# Patient Record
Sex: MALE | Race: WHITE | NOT HISPANIC OR LATINO | URBAN - METROPOLITAN AREA
[De-identification: names, ages, dates, MRNs, and addresses within clinical notes are randomized per-mention and may not be internally consistent; named-entity substitution may affect disease eponyms.]

---

## 2018-08-23 ENCOUNTER — OUTPATIENT (OUTPATIENT)
Dept: OUTPATIENT SERVICES | Facility: HOSPITAL | Age: 63
LOS: 1 days | End: 2018-08-23
Payer: COMMERCIAL

## 2018-08-23 VITALS
HEIGHT: 71 IN | SYSTOLIC BLOOD PRESSURE: 138 MMHG | HEART RATE: 64 BPM | TEMPERATURE: 98 F | DIASTOLIC BLOOD PRESSURE: 90 MMHG | WEIGHT: 175.93 LBS | RESPIRATION RATE: 16 BRPM

## 2018-08-23 DIAGNOSIS — J38.1 POLYP OF VOCAL CORD AND LARYNX: ICD-10-CM

## 2018-08-23 DIAGNOSIS — Z01.818 ENCOUNTER FOR OTHER PREPROCEDURAL EXAMINATION: ICD-10-CM

## 2018-08-23 DIAGNOSIS — Z90.49 ACQUIRED ABSENCE OF OTHER SPECIFIED PARTS OF DIGESTIVE TRACT: Chronic | ICD-10-CM

## 2018-08-23 LAB
ANION GAP SERPL CALC-SCNC: 7 MMOL/L — SIGNIFICANT CHANGE UP (ref 5–17)
APTT BLD: 33.4 SEC — SIGNIFICANT CHANGE UP (ref 27.5–37.4)
BUN SERPL-MCNC: 14 MG/DL — SIGNIFICANT CHANGE UP (ref 7–23)
CALCIUM SERPL-MCNC: 9.6 MG/DL — SIGNIFICANT CHANGE UP (ref 8.5–10.1)
CHLORIDE SERPL-SCNC: 106 MMOL/L — SIGNIFICANT CHANGE UP (ref 96–108)
CO2 SERPL-SCNC: 28 MMOL/L — SIGNIFICANT CHANGE UP (ref 22–31)
CREAT SERPL-MCNC: 0.89 MG/DL — SIGNIFICANT CHANGE UP (ref 0.5–1.3)
GLUCOSE SERPL-MCNC: 97 MG/DL — SIGNIFICANT CHANGE UP (ref 70–99)
HCT VFR BLD CALC: 47.2 % — SIGNIFICANT CHANGE UP (ref 39–50)
HGB BLD-MCNC: 16.1 G/DL — SIGNIFICANT CHANGE UP (ref 13–17)
INR BLD: 1 RATIO — SIGNIFICANT CHANGE UP (ref 0.88–1.16)
MCHC RBC-ENTMCNC: 33 PG — SIGNIFICANT CHANGE UP (ref 27–34)
MCHC RBC-ENTMCNC: 34.1 GM/DL — SIGNIFICANT CHANGE UP (ref 32–36)
MCV RBC AUTO: 96.7 FL — SIGNIFICANT CHANGE UP (ref 80–100)
NRBC # BLD: 0 /100 WBCS — SIGNIFICANT CHANGE UP (ref 0–0)
PLATELET # BLD AUTO: 294 K/UL — SIGNIFICANT CHANGE UP (ref 150–400)
POTASSIUM SERPL-MCNC: 4.5 MMOL/L — SIGNIFICANT CHANGE UP (ref 3.5–5.3)
POTASSIUM SERPL-SCNC: 4.5 MMOL/L — SIGNIFICANT CHANGE UP (ref 3.5–5.3)
PROTHROM AB SERPL-ACNC: 10.9 SEC — SIGNIFICANT CHANGE UP (ref 9.8–12.7)
RBC # BLD: 4.88 M/UL — SIGNIFICANT CHANGE UP (ref 4.2–5.8)
RBC # FLD: 12.6 % — SIGNIFICANT CHANGE UP (ref 10.3–14.5)
SODIUM SERPL-SCNC: 141 MMOL/L — SIGNIFICANT CHANGE UP (ref 135–145)
WBC # BLD: 9.65 K/UL — SIGNIFICANT CHANGE UP (ref 3.8–10.5)
WBC # FLD AUTO: 9.65 K/UL — SIGNIFICANT CHANGE UP (ref 3.8–10.5)

## 2018-08-23 PROCEDURE — 93010 ELECTROCARDIOGRAM REPORT: CPT | Mod: NC

## 2018-08-23 PROCEDURE — 86901 BLOOD TYPING SEROLOGIC RH(D): CPT

## 2018-08-23 PROCEDURE — 85027 COMPLETE CBC AUTOMATED: CPT

## 2018-08-23 PROCEDURE — 71046 X-RAY EXAM CHEST 2 VIEWS: CPT | Mod: 26

## 2018-08-23 PROCEDURE — G0463: CPT

## 2018-08-23 PROCEDURE — 85730 THROMBOPLASTIN TIME PARTIAL: CPT

## 2018-08-23 PROCEDURE — 85610 PROTHROMBIN TIME: CPT

## 2018-08-23 PROCEDURE — 93005 ELECTROCARDIOGRAM TRACING: CPT

## 2018-08-23 PROCEDURE — 86850 RBC ANTIBODY SCREEN: CPT

## 2018-08-23 PROCEDURE — 80048 BASIC METABOLIC PNL TOTAL CA: CPT

## 2018-08-23 PROCEDURE — 71046 X-RAY EXAM CHEST 2 VIEWS: CPT

## 2018-08-23 PROCEDURE — 86900 BLOOD TYPING SEROLOGIC ABO: CPT

## 2018-08-23 NOTE — H&P PST ADULT - NSANTHOSAYNRD_GEN_A_CORE
No. PRISCA screening performed.  STOP BANG Legend: 0-2 = LOW Risk; 3-4 = INTERMEDIATE Risk; 5-8 = HIGH Risk

## 2018-08-23 NOTE — H&P PST ADULT - HISTORY OF PRESENT ILLNESS
63 y/o male with c/o of "colds" on and off for few months. Was seen by Dr Gibbs few weeks ago, had a cat scan, followed up with him 3 weeks later, now scheduled to have biopsy. Pr op testing today. 63 y/o male with c/o of "colds" on and off for few months. Takes Tylenol cold and sinus and feels better after he sneeze and the secretions come out. Was seen by Dr Gibbs few weeks ago, had a cat scan, followed up with him 3 weeks later, was diagnosed with some pocket with secretions and redness in the throat,  now scheduled to have biopsy of the larynx. Pre op testing today.

## 2018-08-23 NOTE — H&P PST ADULT - ASSESSMENT
63 y/o male with c/o of "colds" on and off for few months. Takes Tylenol cold and sinus and feels better after he sneeze and the secretions come out. Was seen by Dr Gibbs few weeks ago, had a cat scan, followed up with him 3 weeks later, was diagnosed with some pocket with secretions and redness in the throat,  now scheduled to have biopsy of the larynx. Pre op testing today.

## 2018-08-28 ENCOUNTER — TRANSCRIPTION ENCOUNTER (OUTPATIENT)
Age: 63
End: 2018-08-28

## 2018-08-28 RX ORDER — SODIUM CHLORIDE 9 MG/ML
1000 INJECTION, SOLUTION INTRAVENOUS
Qty: 0 | Refills: 0 | Status: DISCONTINUED | OUTPATIENT
Start: 2018-08-29 | End: 2018-08-29

## 2018-08-29 ENCOUNTER — OUTPATIENT (OUTPATIENT)
Dept: OUTPATIENT SERVICES | Facility: HOSPITAL | Age: 63
LOS: 1 days | End: 2018-08-29
Payer: COMMERCIAL

## 2018-08-29 ENCOUNTER — RESULT REVIEW (OUTPATIENT)
Age: 63
End: 2018-08-29

## 2018-08-29 VITALS
HEART RATE: 58 BPM | WEIGHT: 175.93 LBS | DIASTOLIC BLOOD PRESSURE: 84 MMHG | RESPIRATION RATE: 21 BRPM | OXYGEN SATURATION: 95 % | TEMPERATURE: 98 F | SYSTOLIC BLOOD PRESSURE: 141 MMHG | HEIGHT: 71 IN

## 2018-08-29 VITALS — HEART RATE: 64 BPM | SYSTOLIC BLOOD PRESSURE: 155 MMHG | DIASTOLIC BLOOD PRESSURE: 80 MMHG

## 2018-08-29 DIAGNOSIS — J38.1 POLYP OF VOCAL CORD AND LARYNX: ICD-10-CM

## 2018-08-29 DIAGNOSIS — Z90.49 ACQUIRED ABSENCE OF OTHER SPECIFIED PARTS OF DIGESTIVE TRACT: Chronic | ICD-10-CM

## 2018-08-29 DIAGNOSIS — Z01.818 ENCOUNTER FOR OTHER PREPROCEDURAL EXAMINATION: ICD-10-CM

## 2018-08-29 PROCEDURE — 43193 ESOPHAGOSCP RIG TRNSO BIOPSY: CPT

## 2018-08-29 PROCEDURE — 31525 DX LARYNGOSCOPY EXCL NB: CPT

## 2018-08-29 PROCEDURE — 88305 TISSUE EXAM BY PATHOLOGIST: CPT

## 2018-08-29 PROCEDURE — 88305 TISSUE EXAM BY PATHOLOGIST: CPT | Mod: 26

## 2018-08-29 RX ORDER — SODIUM CHLORIDE 9 MG/ML
1000 INJECTION, SOLUTION INTRAVENOUS
Qty: 0 | Refills: 0 | Status: DISCONTINUED | OUTPATIENT
Start: 2018-08-29 | End: 2018-08-29

## 2018-08-29 RX ORDER — ASPIRIN/CALCIUM CARB/MAGNESIUM 324 MG
1 TABLET ORAL
Qty: 0 | Refills: 0 | COMMUNITY

## 2018-08-29 RX ORDER — HYDROMORPHONE HYDROCHLORIDE 2 MG/ML
0.5 INJECTION INTRAMUSCULAR; INTRAVENOUS; SUBCUTANEOUS
Qty: 0 | Refills: 0 | Status: DISCONTINUED | OUTPATIENT
Start: 2018-08-29 | End: 2018-08-29

## 2018-08-29 RX ORDER — OXYCODONE HYDROCHLORIDE 5 MG/1
5 TABLET ORAL ONCE
Qty: 0 | Refills: 0 | Status: DISCONTINUED | OUTPATIENT
Start: 2018-08-29 | End: 2018-08-29

## 2018-08-29 RX ORDER — METOCLOPRAMIDE HCL 10 MG
5 TABLET ORAL ONCE
Qty: 0 | Refills: 0 | Status: COMPLETED | OUTPATIENT
Start: 2018-08-29 | End: 2018-08-29

## 2018-08-29 RX ORDER — OXYCODONE HYDROCHLORIDE 5 MG/1
10 TABLET ORAL ONCE
Qty: 0 | Refills: 0 | Status: DISCONTINUED | OUTPATIENT
Start: 2018-08-29 | End: 2018-08-29

## 2018-08-29 RX ORDER — PHENYLEPHRINE/ACETAMINOPHN/CPM 5-325-2MG
0 TABLET ORAL
Qty: 0 | Refills: 0 | COMMUNITY

## 2018-08-29 RX ADMIN — Medication 5 MILLIGRAM(S): at 09:41

## 2018-08-29 RX ADMIN — SODIUM CHLORIDE 60 MILLILITER(S): 9 INJECTION, SOLUTION INTRAVENOUS at 06:51

## 2018-08-29 RX ADMIN — SODIUM CHLORIDE 100 MILLILITER(S): 9 INJECTION, SOLUTION INTRAVENOUS at 09:42

## 2018-08-29 NOTE — ASU DISCHARGE PLAN (ADULT/PEDIATRIC). - NOTIFY
Persistent Nausea and Vomiting/Unable to Urinate/Bleeding that does not stop/Swelling that continues

## 2018-08-29 NOTE — BRIEF OPERATIVE NOTE - PROCEDURE
<<-----Click on this checkbox to enter Procedure Rigid esophagoscopy  08/29/2018    Active  SKYLA  Direct laryngoscopy  08/29/2018    Active  SKYLA

## 2018-08-31 LAB — SURGICAL PATHOLOGY FINAL REPORT - CH: SIGNIFICANT CHANGE UP

## 2018-09-11 PROBLEM — Z00.00 ENCOUNTER FOR PREVENTIVE HEALTH EXAMINATION: Status: ACTIVE | Noted: 2018-09-11

## 2018-09-20 ENCOUNTER — APPOINTMENT (OUTPATIENT)
Dept: OTOLARYNGOLOGY | Facility: CLINIC | Age: 63
End: 2018-09-20
Payer: MEDICARE

## 2018-09-20 VITALS
SYSTOLIC BLOOD PRESSURE: 174 MMHG | WEIGHT: 175 LBS | DIASTOLIC BLOOD PRESSURE: 99 MMHG | HEIGHT: 71 IN | BODY MASS INDEX: 24.5 KG/M2 | HEART RATE: 90 BPM

## 2018-09-20 DIAGNOSIS — Z80.3 FAMILY HISTORY OF MALIGNANT NEOPLASM OF BREAST: ICD-10-CM

## 2018-09-20 DIAGNOSIS — F17.200 NICOTINE DEPENDENCE, UNSPECIFIED, UNCOMPLICATED: ICD-10-CM

## 2018-09-20 DIAGNOSIS — Z78.9 OTHER SPECIFIED HEALTH STATUS: ICD-10-CM

## 2018-09-20 PROCEDURE — 31575 DIAGNOSTIC LARYNGOSCOPY: CPT

## 2018-09-20 PROCEDURE — 99204 OFFICE O/P NEW MOD 45 MIN: CPT | Mod: 25

## 2018-09-25 ENCOUNTER — OUTPATIENT (OUTPATIENT)
Dept: OUTPATIENT SERVICES | Facility: HOSPITAL | Age: 63
LOS: 1 days | Discharge: HOME | End: 2018-09-25

## 2018-09-25 DIAGNOSIS — Z90.49 ACQUIRED ABSENCE OF OTHER SPECIFIED PARTS OF DIGESTIVE TRACT: Chronic | ICD-10-CM

## 2018-09-25 DIAGNOSIS — C76.0 MALIGNANT NEOPLASM OF HEAD, FACE AND NECK: ICD-10-CM

## 2018-09-28 ENCOUNTER — APPOINTMENT (OUTPATIENT)
Dept: HEMATOLOGY ONCOLOGY | Facility: CLINIC | Age: 63
End: 2018-09-28
Payer: MEDICARE

## 2018-09-28 ENCOUNTER — RESULT REVIEW (OUTPATIENT)
Age: 63
End: 2018-09-28

## 2018-09-28 VITALS
SYSTOLIC BLOOD PRESSURE: 124 MMHG | RESPIRATION RATE: 16 BRPM | BODY MASS INDEX: 25 KG/M2 | WEIGHT: 178.57 LBS | HEIGHT: 70.98 IN | TEMPERATURE: 98.6 F | HEART RATE: 72 BPM | OXYGEN SATURATION: 98 % | DIASTOLIC BLOOD PRESSURE: 78 MMHG

## 2018-09-28 DIAGNOSIS — Z80.9 FAMILY HISTORY OF MALIGNANT NEOPLASM, UNSPECIFIED: ICD-10-CM

## 2018-09-28 PROCEDURE — 99205 OFFICE O/P NEW HI 60 MIN: CPT

## 2018-09-29 ENCOUNTER — TRANSCRIPTION ENCOUNTER (OUTPATIENT)
Age: 63
End: 2018-09-29

## 2018-10-02 ENCOUNTER — APPOINTMENT (OUTPATIENT)
Dept: RADIATION ONCOLOGY | Facility: CLINIC | Age: 63
End: 2018-10-02
Payer: MEDICARE

## 2018-10-02 VITALS
SYSTOLIC BLOOD PRESSURE: 154 MMHG | DIASTOLIC BLOOD PRESSURE: 96 MMHG | OXYGEN SATURATION: 100 % | HEIGHT: 69.69 IN | TEMPERATURE: 98.06 F | BODY MASS INDEX: 25.76 KG/M2 | RESPIRATION RATE: 15 BRPM | HEART RATE: 52 BPM | WEIGHT: 177.91 LBS

## 2018-10-02 PROCEDURE — 99204 OFFICE O/P NEW MOD 45 MIN: CPT | Mod: 25

## 2018-10-03 ENCOUNTER — APPOINTMENT (OUTPATIENT)
Dept: SPEECH THERAPY | Facility: CLINIC | Age: 63
End: 2018-10-03

## 2018-10-03 ENCOUNTER — OUTPATIENT (OUTPATIENT)
Dept: OUTPATIENT SERVICES | Facility: HOSPITAL | Age: 63
LOS: 1 days | Discharge: HOME | End: 2018-10-03

## 2018-10-03 ENCOUNTER — OUTPATIENT (OUTPATIENT)
Dept: OUTPATIENT SERVICES | Facility: HOSPITAL | Age: 63
LOS: 1 days | Discharge: HOME | End: 2018-10-03
Payer: MEDICARE

## 2018-10-03 DIAGNOSIS — Z90.49 ACQUIRED ABSENCE OF OTHER SPECIFIED PARTS OF DIGESTIVE TRACT: Chronic | ICD-10-CM

## 2018-10-07 NOTE — HISTORY OF PRESENT ILLNESS
[FreeTextEntry1] : 62 y/o male without significant PMH, h/o smoking, newly diagnosed left vocal cord cancer presents for consideration of radiation treatment.\par \par  Mr. Monroy states that around June this year he started experiencing left throat discomfort and pain which radiated to left ear. A base of the tongue lesion was noted upon examination. \par \par CT neck 7/12/18 showed asymmetric prominence of left base of tongue/left lingual tonsil with thickening of left aryepiglottic fold with possible pre epiglottic and paraepiglottic involvement. 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 PET scan done in 9/2018 outside our system showed left supraglottic lesion and no avid lymph nodes. Patient is being considered for definitive radiation therapy. Patient is seeing Dr Herbert for medical oncology assessment. \par \par Today here for consult. Denies pain, neck pain, dysphagia, voice change. \par \par Side effects of radiation therapy to the head and neck were discussed with patient, including but not limited to skin reaction, fatigue, difficulty swallowing, change in appetite and taste, nausea, bone marrow suppression, cough or shortness of breath.

## 2018-10-08 DIAGNOSIS — R13.12 DYSPHAGIA, OROPHARYNGEAL PHASE: ICD-10-CM

## 2018-10-10 ENCOUNTER — OTHER (OUTPATIENT)
Age: 63
End: 2018-10-10

## 2018-10-11 ENCOUNTER — APPOINTMENT (OUTPATIENT)
Dept: OTOLARYNGOLOGY | Facility: CLINIC | Age: 63
End: 2018-10-11

## 2018-10-12 NOTE — BRIEF OPERATIVE NOTE - ESTIMATED BLOOD LOSS
2
CONSTITUTIONAL: No fevers, no chills  Eyes: no visual changes  Ears: no ear drainage, no ear pain  Nose: no nasal congestion  Mouth/Throat: no sore throat  Cardiovascular: No Chest pain  Respiratory: No SOB  Gastrointestinal: No n/v/d, no abd pain  Genitourinary: no dysuria, no hematuria  SKIN: no rashes.  NEURO: no headache  MSK: Right elbow forearm pain

## 2018-10-16 ENCOUNTER — OTHER (OUTPATIENT)
Age: 63
End: 2018-10-16

## 2018-10-16 PROCEDURE — 77300 RADIATION THERAPY DOSE PLAN: CPT | Mod: 26

## 2018-10-16 PROCEDURE — 77301 RADIOTHERAPY DOSE PLAN IMRT: CPT | Mod: 26

## 2018-10-16 PROCEDURE — 77338 DESIGN MLC DEVICE FOR IMRT: CPT | Mod: 26

## 2018-11-05 PROCEDURE — 77387B: CUSTOM | Mod: 26

## 2018-11-05 PROCEDURE — 77427 RADIATION TX MANAGEMENT X5: CPT

## 2018-11-06 PROCEDURE — 77387B: CUSTOM | Mod: 26

## 2018-11-07 ENCOUNTER — OTHER (OUTPATIENT)
Age: 63
End: 2018-11-07

## 2018-11-08 PROCEDURE — 77387B: CUSTOM | Mod: 26

## 2018-11-09 PROCEDURE — 77387B: CUSTOM | Mod: 26

## 2018-11-12 PROCEDURE — 77014: CPT | Mod: 26

## 2018-11-13 PROCEDURE — 77427 RADIATION TX MANAGEMENT X5: CPT

## 2018-11-13 PROCEDURE — 77387B: CUSTOM | Mod: 26

## 2018-11-14 PROCEDURE — 77387B: CUSTOM | Mod: 26

## 2018-11-15 ENCOUNTER — OTHER (OUTPATIENT)
Age: 63
End: 2018-11-15

## 2018-11-15 PROCEDURE — 77387B: CUSTOM | Mod: 26

## 2018-11-16 VITALS
WEIGHT: 179.9 LBS | BODY MASS INDEX: 26.04 KG/M2 | OXYGEN SATURATION: 99 % | RESPIRATION RATE: 16 BRPM | DIASTOLIC BLOOD PRESSURE: 61 MMHG | SYSTOLIC BLOOD PRESSURE: 163 MMHG | HEART RATE: 82 BPM

## 2018-11-16 PROCEDURE — 77387B: CUSTOM | Mod: 26

## 2018-11-16 NOTE — REVIEW OF SYSTEMS
[Dysphagia: Grade 0] : Dysphagia: Grade 0 [Fatigue: Grade 0] : Fatigue: Grade 0 [Mucositis Oral: Grade 0] : Mucositis Oral: Grade 0  [Xerostomia: Grade 0] : Xerostomia: Grade 0 [Oral Pain: Grade 0] : Oral Pain: Grade 0 [Dysgeusia: Grade 0] : Dysgeusia: Grade 0 [Skin Hyperpigmentation: Grade 0] : Skin Hyperpigmentation: Grade 0 [Dermatitis Radiation: Grade 0] : Dermatitis Radiation: Grade 0

## 2018-11-18 LAB
ANION GAP SERPL CALC-SCNC: 11 MMOL/L
BASOPHILS # BLD AUTO: 0.02 K/UL
BASOPHILS NFR BLD AUTO: 0.2 %
BUN SERPL-MCNC: 29 MG/DL
CALCIUM SERPL-MCNC: 10 MG/DL
CHLORIDE SERPL-SCNC: 103 MMOL/L
CO2 SERPL-SCNC: 26 MMOL/L
CREAT SERPL-MCNC: 1.39 MG/DL
EOSINOPHIL # BLD AUTO: 0.11 K/UL
EOSINOPHIL NFR BLD AUTO: 1.2 %
GLUCOSE SERPL-MCNC: 90 MG/DL
HCT VFR BLD CALC: 43.7 %
HGB BLD-MCNC: 14.5 G/DL
IMM GRANULOCYTES NFR BLD AUTO: 0.3 %
LYMPHOCYTES # BLD AUTO: 1.32 K/UL
LYMPHOCYTES NFR BLD AUTO: 14.2 %
MAN DIFF?: NORMAL
MCHC RBC-ENTMCNC: 33 PG
MCHC RBC-ENTMCNC: 33.2 GM/DL
MCV RBC AUTO: 99.3 FL
MONOCYTES # BLD AUTO: 0.7 K/UL
MONOCYTES NFR BLD AUTO: 7.5 %
NEUTROPHILS # BLD AUTO: 7.13 K/UL
NEUTROPHILS NFR BLD AUTO: 76.6 %
PLATELET # BLD AUTO: 256 K/UL
POTASSIUM SERPL-SCNC: 4.9 MMOL/L
RBC # BLD: 4.4 M/UL
RBC # FLD: 13.4 %
SODIUM SERPL-SCNC: 140 MMOL/L
WBC # FLD AUTO: 9.31 K/UL

## 2018-11-18 NOTE — DISEASE MANAGEMENT
[Clinical] : TNM Stage: c [II] : II [TTNM] : 2 [NTNM] : 0 [MTNM] : 0 [de-identified] : 7406 [de-identified] : 8967 [de-identified] : Larynx + nodes

## 2018-11-19 PROCEDURE — 77014: CPT | Mod: 26

## 2018-11-20 PROCEDURE — 77427 RADIATION TX MANAGEMENT X5: CPT

## 2018-11-20 PROCEDURE — 77387B: CUSTOM | Mod: 26

## 2018-11-21 ENCOUNTER — OTHER (OUTPATIENT)
Age: 63
End: 2018-11-21

## 2018-11-21 PROCEDURE — 77387B: CUSTOM | Mod: 26

## 2018-11-23 PROCEDURE — 77387B: CUSTOM | Mod: 26

## 2018-11-26 PROCEDURE — 77014: CPT | Mod: 26

## 2018-11-27 PROCEDURE — 77387B: CUSTOM | Mod: 26

## 2018-11-28 ENCOUNTER — OTHER (OUTPATIENT)
Age: 63
End: 2018-11-28

## 2018-11-28 PROCEDURE — 77387B: CUSTOM | Mod: 26

## 2018-11-28 NOTE — REVIEW OF SYSTEMS
[Dysphagia: Grade 1 - Symptomatic, able to eat regular diet] : Dysphagia: Grade 1 - Symptomatic, able to eat regular diet [Fatigue: Grade 0] : Fatigue: Grade 0 [Mucositis Oral: Grade 0] : Mucositis Oral: Grade 0  [Xerostomia: Grade 0] : Xerostomia: Grade 0 [Oral Pain: Grade 0] : Oral Pain: Grade 0 [Dysgeusia: Grade 0] : Dysgeusia: Grade 0 [Skin Hyperpigmentation: Grade 0] : Skin Hyperpigmentation: Grade 0 [Dermatitis Radiation: Grade 0] : Dermatitis Radiation: Grade 0

## 2018-11-29 PROCEDURE — 77387B: CUSTOM | Mod: 26

## 2018-11-30 PROCEDURE — 77387B: CUSTOM | Mod: 26

## 2018-12-03 PROCEDURE — 77014: CPT | Mod: 26

## 2018-12-03 PROCEDURE — 77427 RADIATION TX MANAGEMENT X5: CPT

## 2018-12-04 PROCEDURE — 77387B: CUSTOM | Mod: 26

## 2018-12-05 PROCEDURE — 77387B: CUSTOM | Mod: 26

## 2018-12-06 ENCOUNTER — OTHER (OUTPATIENT)
Age: 63
End: 2018-12-06

## 2018-12-06 VITALS
SYSTOLIC BLOOD PRESSURE: 144 MMHG | RESPIRATION RATE: 16 BRPM | DIASTOLIC BLOOD PRESSURE: 90 MMHG | OXYGEN SATURATION: 97 % | TEMPERATURE: 98.42 F | WEIGHT: 176.37 LBS | HEART RATE: 84 BPM

## 2018-12-06 PROCEDURE — 77387B: CUSTOM | Mod: 26

## 2018-12-06 NOTE — VITALS
[Maximal Pain Intensity: 6/10] : 6/10 [Least Pain Intensity: 0/10] : 0/10 [Pain Description/Quality: ___] : Pain description/quality: [unfilled] [Pain Duration: ___] : Pain duration: [unfilled] [Pain Location: ___] : Pain Location: [unfilled] [Pain Interferes with ADLs] : Pain interferes with activities of daily living. [NSAID/Non-Opioid] : NSAID/Non-Opioid [90: Able to carry normal activity; minor signs or symptoms of disease.] : 90: Able to carry normal activity; minor signs or symptoms of disease.  [ECOG Performance Status: 0 - Fully active, able to carry on all pre-disease performance without restriction] : Performance Status: 0 - Fully active, able to carry on all pre-disease performance without restriction

## 2018-12-07 PROCEDURE — 77427 RADIATION TX MANAGEMENT X5: CPT

## 2018-12-07 PROCEDURE — 77387B: CUSTOM | Mod: 26

## 2018-12-09 LAB
ALBUMIN SERPL ELPH-MCNC: 4.6 G/DL
ALP BLD-CCNC: 83 U/L
ALT SERPL-CCNC: 21 U/L
ANION GAP SERPL CALC-SCNC: 12 MMOL/L
AST SERPL-CCNC: 14 U/L
BASOPHILS # BLD AUTO: 0.01 K/UL
BASOPHILS NFR BLD AUTO: 0.1 %
BILIRUB SERPL-MCNC: 0.2 MG/DL
BUN SERPL-MCNC: 16 MG/DL
CALCIUM SERPL-MCNC: 9.7 MG/DL
CHLORIDE SERPL-SCNC: 104 MMOL/L
CO2 SERPL-SCNC: 25 MMOL/L
CREAT SERPL-MCNC: 0.87 MG/DL
EOSINOPHIL # BLD AUTO: 0.04 K/UL
EOSINOPHIL NFR BLD AUTO: 0.5 %
GLUCOSE SERPL-MCNC: 110 MG/DL
HCT VFR BLD CALC: 44.9 %
HGB BLD-MCNC: 15.6 G/DL
IMM GRANULOCYTES NFR BLD AUTO: 0.3 %
LYMPHOCYTES # BLD AUTO: 0.64 K/UL
LYMPHOCYTES NFR BLD AUTO: 8.2 %
MAN DIFF?: NORMAL
MCHC RBC-ENTMCNC: 33.8 PG
MCHC RBC-ENTMCNC: 34.7 GM/DL
MCV RBC AUTO: 97.4 FL
MONOCYTES # BLD AUTO: 0.54 K/UL
MONOCYTES NFR BLD AUTO: 6.9 %
NEUTROPHILS # BLD AUTO: 6.54 K/UL
NEUTROPHILS NFR BLD AUTO: 84 %
PLATELET # BLD AUTO: 252 K/UL
POTASSIUM SERPL-SCNC: 4.4 MMOL/L
PROT SERPL-MCNC: 7.3 G/DL
RBC # BLD: 4.61 M/UL
RBC # FLD: 13.1 %
SODIUM SERPL-SCNC: 141 MMOL/L
WBC # FLD AUTO: 7.79 K/UL

## 2018-12-10 PROCEDURE — 77014: CPT | Mod: 26

## 2018-12-10 NOTE — DISEASE MANAGEMENT
[Clinical] : TNM Stage: c [II] : II [TTNM] : 2 [NTNM] : 0 [MTNM] : 0 [de-identified] : 6209 [de-identified] : 0043 [de-identified] : Larynx + nodes

## 2018-12-10 NOTE — DISEASE MANAGEMENT
[Clinical] : TNM Stage: c [II] : II [TTNM] : 2 [NTNM] : 0 [MTNM] : 0 [de-identified] : 8952 [de-identified] : 4987 [de-identified] : Larynx + nodes

## 2018-12-10 NOTE — HISTORY OF PRESENT ILLNESS
[FreeTextEntry1] : 64 y/o male without significant PMH, h/o smoking, newly diagnosed left vocal cord cancer presents for  radiation treatment. He report pain and difficulty  with swallowing. using Magic wash. Today her completed  3200/7000 cGy

## 2018-12-10 NOTE — HISTORY OF PRESENT ILLNESS
[FreeTextEntry1] : 62 y/o male without significant PMH, h/o smoking, newly diagnosed left vocal cord cancer presents for  radiation treatment. He report pain and difficulty  with swallowing. Pt cont to use Magic wash with effect. Pt using Advil Cold and Sinus gel for mucus build up. Pt with neck redness. Pt using AfterSun with effect. Today he completed  4400/7000 cGy

## 2018-12-11 PROCEDURE — 77387B: CUSTOM | Mod: 26

## 2018-12-12 PROCEDURE — 77387B: CUSTOM | Mod: 26

## 2018-12-13 ENCOUNTER — OTHER (OUTPATIENT)
Age: 63
End: 2018-12-13

## 2018-12-13 VITALS
SYSTOLIC BLOOD PRESSURE: 158 MMHG | OXYGEN SATURATION: 99 % | RESPIRATION RATE: 18 BRPM | BODY MASS INDEX: 25.66 KG/M2 | HEART RATE: 72 BPM | WEIGHT: 177.25 LBS | DIASTOLIC BLOOD PRESSURE: 94 MMHG

## 2018-12-13 DIAGNOSIS — L30.9 DERMATITIS, UNSPECIFIED: ICD-10-CM

## 2018-12-13 PROCEDURE — 77387B: CUSTOM | Mod: 26

## 2018-12-13 NOTE — VITALS
[Maximal Pain Intensity: 10/10] : 10/10 [Least Pain Intensity: 3/10] : 3/10 [Pain Description/Quality: ___] : Pain description/quality: [unfilled] [Pain Duration: ___] : Pain duration: [unfilled] [Pain Location: ___] : Pain Location: [unfilled] [Pain Interferes with ADLs] : Pain interferes with activities of daily living. [OTC] : OTC [80: Normal activity with effort; some signs or symptoms of disease.] : 80: Normal activity with effort; some signs or symptoms of disease.  [ECOG Performance Status: 0 - Fully active, able to carry on all pre-disease performance without restriction] : Performance Status: 0 - Fully active, able to carry on all pre-disease performance without restriction

## 2018-12-13 NOTE — REVIEW OF SYSTEMS
[Constipation: Grade 0] : Constipation: Grade 0 [Diarrhea: Grade 0] : Diarrhea: Grade 0 [Dysphagia: Grade 1 - Symptomatic, able to eat regular diet] : Dysphagia: Grade 1 - Symptomatic, able to eat regular diet [Fatigue: Grade 0] : Fatigue: Grade 0 [Mucositis Oral: Grade 0] : Mucositis Oral: Grade 0  [Xerostomia: Grade 0] : Xerostomia: Grade 0 [Oral Pain: Grade 0] : Oral Pain: Grade 0 [Dysgeusia: Grade 0] : Dysgeusia: Grade 0 [Skin Hyperpigmentation: Grade 0] : Skin Hyperpigmentation: Grade 0 [Dermatitis Radiation: Grade 1 - Faint erythema or dry desquamation] : Dermatitis Radiation: Grade 1 - Faint erythema or dry desquamation

## 2018-12-14 PROCEDURE — 77387B: CUSTOM | Mod: 26

## 2018-12-14 PROCEDURE — 77427 RADIATION TX MANAGEMENT X5: CPT

## 2018-12-17 LAB
ALBUMIN SERPL ELPH-MCNC: 4.3 G/DL
ALP BLD-CCNC: 81 U/L
ALT SERPL-CCNC: 19 U/L
ANION GAP SERPL CALC-SCNC: 13 MMOL/L
AST SERPL-CCNC: 13 U/L
BASOPHILS # BLD AUTO: 0.02 K/UL
BASOPHILS NFR BLD AUTO: 0.3 %
BILIRUB SERPL-MCNC: 0.2 MG/DL
BUN SERPL-MCNC: 18 MG/DL
CALCIUM SERPL-MCNC: 9.9 MG/DL
CHLORIDE SERPL-SCNC: 103 MMOL/L
CO2 SERPL-SCNC: 25 MMOL/L
CREAT SERPL-MCNC: 1.04 MG/DL
EOSINOPHIL # BLD AUTO: 0.07 K/UL
EOSINOPHIL NFR BLD AUTO: 0.9 %
GLUCOSE SERPL-MCNC: 61 MG/DL
HCT VFR BLD CALC: 44.9 %
HGB BLD-MCNC: 14.6 G/DL
IMM GRANULOCYTES NFR BLD AUTO: 0.1 %
LYMPHOCYTES # BLD AUTO: 0.61 K/UL
LYMPHOCYTES NFR BLD AUTO: 7.6 %
MAN DIFF?: NORMAL
MCHC RBC-ENTMCNC: 32.5 GM/DL
MCHC RBC-ENTMCNC: 32.7 PG
MCV RBC AUTO: 100.4 FL
MONOCYTES # BLD AUTO: 0.73 K/UL
MONOCYTES NFR BLD AUTO: 9.1 %
NEUTROPHILS # BLD AUTO: 6.55 K/UL
NEUTROPHILS NFR BLD AUTO: 82 %
PLATELET # BLD AUTO: 259 K/UL
POTASSIUM SERPL-SCNC: 4.7 MMOL/L
PROT SERPL-MCNC: 7.3 G/DL
RBC # BLD: 4.47 M/UL
RBC # FLD: 13.1 %
SODIUM SERPL-SCNC: 141 MMOL/L
WBC # FLD AUTO: 7.99 K/UL

## 2018-12-17 PROCEDURE — 77014: CPT | Mod: 26

## 2018-12-17 NOTE — DISEASE MANAGEMENT
[Clinical] : TNM Stage: c [II] : II [TTNM] : 2 [NTNM] : 0 [MTNM] : 0 [de-identified] : 6243 [de-identified] : 7249 [de-identified] : Larynx + nodes

## 2018-12-17 NOTE — HISTORY OF PRESENT ILLNESS
[FreeTextEntry1] : 64 y/o male without significant PMH, h/o smoking, newly diagnosed left vocal cord cancer presents for  radiation treatment. He report pain and difficulty  with swallowing. Pt cont to use Magic wash with effect. Pt using Advil Cold and Sinus gel for mucus build up. Pt with neck erythema .. Pt using After Sun with good results.

## 2018-12-19 PROCEDURE — 77387B: CUSTOM | Mod: 26

## 2018-12-20 ENCOUNTER — OTHER (OUTPATIENT)
Age: 63
End: 2018-12-20

## 2018-12-20 VITALS
RESPIRATION RATE: 15 BRPM | WEIGHT: 173.72 LBS | DIASTOLIC BLOOD PRESSURE: 94 MMHG | TEMPERATURE: 97.88 F | BODY MASS INDEX: 25.15 KG/M2 | HEART RATE: 71 BPM | SYSTOLIC BLOOD PRESSURE: 152 MMHG | OXYGEN SATURATION: 99 %

## 2018-12-20 LAB
BASOPHILS # BLD AUTO: 0.02 K/UL
BASOPHILS NFR BLD AUTO: 0.3 %
EOSINOPHIL # BLD AUTO: 0.08 K/UL
EOSINOPHIL NFR BLD AUTO: 1.2 %
HCT VFR BLD CALC: 44.5 %
HGB BLD-MCNC: 14.7 G/DL
IMM GRANULOCYTES NFR BLD AUTO: 0.1 %
LYMPHOCYTES # BLD AUTO: 0.57 K/UL
LYMPHOCYTES NFR BLD AUTO: 8.4 %
MAN DIFF?: NORMAL
MCHC RBC-ENTMCNC: 32.7 PG
MCHC RBC-ENTMCNC: 33 GM/DL
MCV RBC AUTO: 99.1 FL
MONOCYTES # BLD AUTO: 0.78 K/UL
MONOCYTES NFR BLD AUTO: 11.6 %
NEUTROPHILS # BLD AUTO: 5.29 K/UL
NEUTROPHILS NFR BLD AUTO: 78.4 %
PLATELET # BLD AUTO: 245 K/UL
RBC # BLD: 4.49 M/UL
RBC # FLD: 13.3 %
WBC # FLD AUTO: 6.75 K/UL

## 2018-12-20 PROCEDURE — 77387B: CUSTOM | Mod: 26

## 2018-12-20 NOTE — VITALS
[Maximal Pain Intensity: 8/10] : 8/10 [Least Pain Intensity: 3/10] : 3/10 [Pain Description/Quality: ___] : Pain description/quality: [unfilled] [Pain Duration: ___] : Pain duration: [unfilled] [Pain Location: ___] : Pain Location: [unfilled] [Pain Interferes with ADLs] : Pain interferes with activities of daily living. [OTC] : OTC [80: Normal activity with effort; some signs or symptoms of disease.] : 80: Normal activity with effort; some signs or symptoms of disease.

## 2018-12-20 NOTE — REVIEW OF SYSTEMS
[Constipation: Grade 0] : Constipation: Grade 0 [Diarrhea: Grade 0] : Diarrhea: Grade 0 [Dysphagia: Grade 1 - Symptomatic, able to eat regular diet] : Dysphagia: Grade 1 - Symptomatic, able to eat regular diet [Fatigue: Grade 0] : Fatigue: Grade 0 [Mucositis Oral: Grade 0] : Mucositis Oral: Grade 0  [Xerostomia: Grade 0] : Xerostomia: Grade 0 [Oral Pain: Grade 0] : Oral Pain: Grade 0 [Dysgeusia: Grade 0] : Dysgeusia: Grade 0 [Hoarseness: Grade 1 - Mild or intermittent voice change; fully understandable; self-resolves] : Hoarseness: Grade 1 - Mild or intermittent voice change; fully understandable; self-resolves [Skin Hyperpigmentation: Grade 0] : Skin Hyperpigmentation: Grade 0 [Dermatitis Radiation: Grade 1 - Faint erythema or dry desquamation] : Dermatitis Radiation: Grade 1 - Faint erythema or dry desquamation

## 2018-12-21 PROCEDURE — 77387B: CUSTOM | Mod: 26

## 2018-12-23 NOTE — DISEASE MANAGEMENT
[Clinical] : TNM Stage: c [II] : II [TTNM] : 2 [NTNM] : 0 [MTNM] : 0 [de-identified] : 4020 [de-identified] : 4848 [de-identified] : Larynx + nodes

## 2018-12-23 NOTE — HISTORY OF PRESENT ILLNESS
[FreeTextEntry1] : 64 y/o male without significant PMH, h/o smoking, newly diagnosed left vocal cord cancer presents for  radiation treatment. He report pain and difficulty  with swallowing. Pt cont to use Magic wash with effect. Pt using Advil Cold and Sinus gel for mucus build up. Pt with neck erythema .. Pt using After Sun with good results.\par having increase in difficulty while swallowing food. voice hoarse . mod erythema.. no sores in mouth but  skin is rough feeling in mouth.

## 2018-12-24 LAB
ALBUMIN SERPL ELPH-MCNC: 4.7 G/DL
ALP BLD-CCNC: 97 U/L
ALT SERPL-CCNC: 18 U/L
ANION GAP SERPL CALC-SCNC: 15 MMOL/L
AST SERPL-CCNC: 14 U/L
BILIRUB SERPL-MCNC: 0.2 MG/DL
BUN SERPL-MCNC: 15 MG/DL
CALCIUM SERPL-MCNC: 9.9 MG/DL
CHLORIDE SERPL-SCNC: 102 MMOL/L
CO2 SERPL-SCNC: 25 MMOL/L
CREAT SERPL-MCNC: 0.79 MG/DL
GLUCOSE SERPL-MCNC: 65 MG/DL
POTASSIUM SERPL-SCNC: 4.6 MMOL/L
PROT SERPL-MCNC: 7.4 G/DL
SODIUM SERPL-SCNC: 142 MMOL/L

## 2018-12-24 PROCEDURE — 77014: CPT | Mod: 26

## 2018-12-26 ENCOUNTER — OTHER (OUTPATIENT)
Age: 63
End: 2018-12-26

## 2018-12-26 PROCEDURE — 77387B: CUSTOM | Mod: 26

## 2018-12-27 PROCEDURE — 77387B: CUSTOM | Mod: 26

## 2019-01-29 ENCOUNTER — APPOINTMENT (OUTPATIENT)
Dept: RADIATION ONCOLOGY | Facility: CLINIC | Age: 64
End: 2019-01-29
Payer: MEDICARE

## 2019-01-29 VITALS
DIASTOLIC BLOOD PRESSURE: 98 MMHG | BODY MASS INDEX: 25.5 KG/M2 | HEART RATE: 80 BPM | SYSTOLIC BLOOD PRESSURE: 199 MMHG | OXYGEN SATURATION: 96 % | WEIGHT: 176.15 LBS

## 2019-01-29 PROCEDURE — 99024 POSTOP FOLLOW-UP VISIT: CPT | Mod: GC

## 2019-01-29 NOTE — REVIEW OF SYSTEMS
[Tinnitus - Grade 0] : Tinnitus - Grade 0 [Blurred Vision: Grade 0] : Blurred Vision: Grade 0 [Mucositis Oral: Grade 0] : Mucositis Oral: Grade 0  [Xerostomia: Grade 0] : Xerostomia: Grade 0 [Oral Pain: Grade 0] : Oral Pain: Grade 0 [Salivary duct inflammation: Grade 0] : Salivary duct inflammation: Grade 0 [Dysgeusia: Grade 1- Altered taste but no change in diet] : Dysgeusia: Grade 1 - Altered taste but no change in diet [Skin Hyperpigmentation: Grade 0] : Skin Hyperpigmentation: Grade 0 [Dermatitis Radiation: Grade 0] : Dermatitis Radiation: Grade 0

## 2019-01-30 NOTE — HISTORY OF PRESENT ILLNESS
[FreeTextEntry1] : Mr. 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 He returns today for post treatment evaluation. \par \par He feels well and is tolerating PO without nausea. No dysphagia or odynophagia. Weight is stable. Skin reaction has resolved. He denies stiffness of the neck. He continues to have taste loss but is eating a varied diet.

## 2019-03-14 ENCOUNTER — APPOINTMENT (OUTPATIENT)
Dept: OTOLARYNGOLOGY | Facility: CLINIC | Age: 64
End: 2019-03-14
Payer: MEDICARE

## 2019-03-14 PROCEDURE — 99214 OFFICE O/P EST MOD 30 MIN: CPT | Mod: 25

## 2019-03-14 PROCEDURE — 31575 DIAGNOSTIC LARYNGOSCOPY: CPT

## 2019-03-14 NOTE — HISTORY OF PRESENT ILLNESS
[de-identified] : SCCA larynx completed RT 12/27. Pt denies dysphagia, denies pain. Increased phlegm improving. Pt continues to smoke 3-4 cigarettes per day.  Taste still off. Weight has been stable.\par Complete review of systems which was performed during a previous encounter was reviewed with the patient and there are no changes except as stated in the HPI section.\par

## 2019-03-14 NOTE — PHYSICAL EXAM
[Midline] : trachea located in midline position [Normal] : no rashes [de-identified] : post treatment changes. No discrete mass

## 2019-03-14 NOTE — CONSULT LETTER
[Dear  ___] : Dear  [unfilled], [Courtesy Letter:] : I had the pleasure of seeing your patient, [unfilled], in my office today. [Please see my note below.] : Please see my note below. [Consult Closing:] : Thank you very much for allowing me to participate in the care of this patient.  If you have any questions, please do not hesitate to contact me. [Sincerely,] : Sincerely, [FreeTextEntry2] : Dr Kamara [FreeTextEntry3] : \par Rolly Evans MD, FACS\par \par Otolaryngology-Head and Neck Surgery\par [employer] [facility]\par

## 2019-03-24 ENCOUNTER — FORM ENCOUNTER (OUTPATIENT)
Age: 64
End: 2019-03-24

## 2019-03-25 ENCOUNTER — APPOINTMENT (OUTPATIENT)
Dept: NUCLEAR MEDICINE | Facility: IMAGING CENTER | Age: 64
End: 2019-03-25
Payer: MEDICARE

## 2019-03-25 ENCOUNTER — OUTPATIENT (OUTPATIENT)
Dept: OUTPATIENT SERVICES | Facility: HOSPITAL | Age: 64
LOS: 1 days | End: 2019-03-25
Payer: MEDICARE

## 2019-03-25 DIAGNOSIS — Z90.49 ACQUIRED ABSENCE OF OTHER SPECIFIED PARTS OF DIGESTIVE TRACT: Chronic | ICD-10-CM

## 2019-03-25 DIAGNOSIS — C32.9 MALIGNANT NEOPLASM OF LARYNX, UNSPECIFIED: ICD-10-CM

## 2019-03-25 PROCEDURE — 78815 PET IMAGE W/CT SKULL-THIGH: CPT | Mod: 26,PS

## 2019-03-25 PROCEDURE — 78815 PET IMAGE W/CT SKULL-THIGH: CPT

## 2019-03-25 PROCEDURE — A9552: CPT

## 2019-04-04 ENCOUNTER — APPOINTMENT (OUTPATIENT)
Dept: RADIATION ONCOLOGY | Facility: CLINIC | Age: 64
End: 2019-04-04
Payer: MEDICARE

## 2019-04-04 ENCOUNTER — APPOINTMENT (OUTPATIENT)
Dept: OTOLARYNGOLOGY | Facility: CLINIC | Age: 64
End: 2019-04-04
Payer: MEDICARE

## 2019-04-04 VITALS
OXYGEN SATURATION: 99 % | DIASTOLIC BLOOD PRESSURE: 91 MMHG | HEART RATE: 65 BPM | RESPIRATION RATE: 16 BRPM | WEIGHT: 168.32 LBS | SYSTOLIC BLOOD PRESSURE: 156 MMHG | BODY MASS INDEX: 24.15 KG/M2

## 2019-04-04 VITALS
BODY MASS INDEX: 25.05 KG/M2 | DIASTOLIC BLOOD PRESSURE: 94 MMHG | HEIGHT: 70 IN | SYSTOLIC BLOOD PRESSURE: 146 MMHG | WEIGHT: 175 LBS | HEART RATE: 70 BPM

## 2019-04-04 PROCEDURE — 99214 OFFICE O/P EST MOD 30 MIN: CPT | Mod: GC

## 2019-04-04 PROCEDURE — 31575 DIAGNOSTIC LARYNGOSCOPY: CPT

## 2019-04-04 PROCEDURE — 99214 OFFICE O/P EST MOD 30 MIN: CPT | Mod: 25

## 2019-04-04 RX ORDER — DIPHENHYDRAMINE HYDROCHLORIDE AND LIDOCAINE HYDROCHLORIDE AND ALUMINUM HYDROXIDE AND MAGNESIUM HYDRO
KIT
Qty: 1 | Refills: 2 | Status: DISCONTINUED | COMMUNITY
Start: 2018-11-21 | End: 2019-04-04

## 2019-04-04 RX ORDER — SILVER SULFADIAZINE 10 MG/G
1 CREAM TOPICAL DAILY
Qty: 1 | Refills: 1 | Status: DISCONTINUED | COMMUNITY
Start: 2018-12-13 | End: 2019-04-04

## 2019-04-04 RX ORDER — OXYCODONE 5 MG/1
5 TABLET ORAL EVERY 6 HOURS
Qty: 20 | Refills: 0 | Status: DISCONTINUED | COMMUNITY
Start: 2018-12-20 | End: 2019-04-04

## 2019-04-04 NOTE — PROCEDURE
[Topical Lidocaine] : topical lidocaine [Oxymetazoline HCl] : oxymetazoline HCl [Flexible Endoscope] : examined with the flexible endoscope [Serial Number: ___] : Serial Number: [unfilled]

## 2019-04-04 NOTE — PHYSICAL EXAM
[Normal] : normoactive bowel sounds, soft and nontender, no hepatosplenomegaly or masses appreciated [Cervical Lymph Nodes Enlarged Posterior Bilaterally] : posterior cervical [Cervical Lymph Nodes Enlarged Anterior Bilaterally] : anterior cervical [Supraclavicular Lymph Nodes Enlarged Bilaterally] : supraclavicular [No Focal Deficits] : no focal deficits [Oriented To Time, Place, And Person] : oriented to person, place, and time

## 2019-04-05 NOTE — HISTORY OF PRESENT ILLNESS
[FreeTextEntry1] : Mr. 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 He returns today for post treatment evaluation. \par \par He feels well and is tolerating PO without nausea. c/o mucus in the throat. No dysphagia or odynophagia. Weight is stable. Skin reaction has resolved. He denies stiffness of the neck. He continues to have taste loss but is eating a varied diet. \par \par PET/CT from 3/25/19 reviewed with patient.

## 2019-04-07 NOTE — CONSULT LETTER
[Dear  ___] : Dear  [unfilled], [Courtesy Letter:] : I had the pleasure of seeing your patient, [unfilled], in my office today. [Please see my note below.] : Please see my note below. [Consult Closing:] : Thank you very much for allowing me to participate in the care of this patient.  If you have any questions, please do not hesitate to contact me. [Sincerely,] : Sincerely, [FreeTextEntry2] : Dr Kamara  [FreeTextEntry3] : \par Rolly Evans MD, FACS\par \par Otolaryngology-Head and Neck Surgery\par [employer] at [facility]\par

## 2019-04-07 NOTE — PHYSICAL EXAM
[Midline] : trachea located in midline position [Normal] : no rashes [de-identified] : post treatment changes. No discrete mass

## 2019-04-07 NOTE — REASON FOR VISIT
[Subsequent Evaluation] : a subsequent evaluation for [Spouse] : spouse [FreeTextEntry2] : follow up for SCCA larynx completed RT 12/27, PET whole body 3/25/19, results to be discussed

## 2019-04-07 NOTE — HISTORY OF PRESENT ILLNESS
[de-identified] : 63 year old male follow up for SCCA larynx completed RT 12/27. T2N0M0. PET whole body 3/25/19, results to be discussed. Patient reports abundant mucus production in throat, having to expectorate every 2-3 hours, clear phlegm reported.  Patient reports difficulty breathing due to mucus obstructing airway at times. This seems to be getting better. Patient denies dysphagia, odynophagia, pain in throat, choking, aspirating, hemoptysis and fevers.  Weight is watched and managed.\par Complete review of systems which was performed during a previous encounter was reviewed with the patient and there are no changes except as stated in the HPI section.\par \par \par

## 2019-06-11 ENCOUNTER — FORM ENCOUNTER (OUTPATIENT)
Age: 64
End: 2019-06-11

## 2019-06-12 ENCOUNTER — OUTPATIENT (OUTPATIENT)
Dept: OUTPATIENT SERVICES | Facility: HOSPITAL | Age: 64
LOS: 1 days | End: 2019-06-12
Payer: MEDICARE

## 2019-06-12 ENCOUNTER — APPOINTMENT (OUTPATIENT)
Dept: CT IMAGING | Facility: IMAGING CENTER | Age: 64
End: 2019-06-12
Payer: MEDICARE

## 2019-06-12 DIAGNOSIS — Z90.49 ACQUIRED ABSENCE OF OTHER SPECIFIED PARTS OF DIGESTIVE TRACT: Chronic | ICD-10-CM

## 2019-06-12 DIAGNOSIS — C32.9 MALIGNANT NEOPLASM OF LARYNX, UNSPECIFIED: ICD-10-CM

## 2019-06-12 PROCEDURE — 70491 CT SOFT TISSUE NECK W/DYE: CPT

## 2019-06-12 PROCEDURE — 70491 CT SOFT TISSUE NECK W/DYE: CPT | Mod: 26

## 2019-06-12 PROCEDURE — 82565 ASSAY OF CREATININE: CPT

## 2019-07-11 ENCOUNTER — APPOINTMENT (OUTPATIENT)
Dept: RADIATION ONCOLOGY | Facility: CLINIC | Age: 64
End: 2019-07-11
Payer: MEDICARE

## 2019-07-11 ENCOUNTER — APPOINTMENT (OUTPATIENT)
Dept: OTOLARYNGOLOGY | Facility: CLINIC | Age: 64
End: 2019-07-11
Payer: MEDICARE

## 2019-07-11 VITALS
HEIGHT: 70 IN | BODY MASS INDEX: 25.05 KG/M2 | DIASTOLIC BLOOD PRESSURE: 90 MMHG | WEIGHT: 175 LBS | HEART RATE: 64 BPM | SYSTOLIC BLOOD PRESSURE: 155 MMHG

## 2019-07-11 VITALS
RESPIRATION RATE: 14 BRPM | SYSTOLIC BLOOD PRESSURE: 162 MMHG | BODY MASS INDEX: 22.85 KG/M2 | HEART RATE: 51 BPM | OXYGEN SATURATION: 99 % | TEMPERATURE: 98.6 F | DIASTOLIC BLOOD PRESSURE: 95 MMHG | WEIGHT: 159.28 LBS

## 2019-07-11 PROCEDURE — 99214 OFFICE O/P EST MOD 30 MIN: CPT | Mod: 25

## 2019-07-11 PROCEDURE — 99214 OFFICE O/P EST MOD 30 MIN: CPT | Mod: GC

## 2019-07-11 PROCEDURE — 31575 DIAGNOSTIC LARYNGOSCOPY: CPT

## 2019-07-11 RX ORDER — IBUPROFEN AND PSEUDOEPHEDRINE HYDROCHLORIDE 200; 30 MG/1; MG/1
TABLET, COATED ORAL
Refills: 0 | Status: COMPLETED | COMMUNITY
End: 2019-07-11

## 2019-07-11 RX ORDER — ISOPROPYL ALCOHOL 70 ML/100ML
SWAB TOPICAL
Refills: 0 | Status: COMPLETED | COMMUNITY
End: 2019-07-11

## 2019-07-11 NOTE — HISTORY OF PRESENT ILLNESS
[de-identified] : 63 year old male follow up for SCCA larynx completed RT 12/27. T2N0M0. PET whole body 3/25/19, results to be discussed. Patient reports abundant mucus production in throat, having to expectorate every 2-3 hours, clear phlegm.  Patient denies dysphagia, odynophagia, pain in throat, choking, aspirating, hemoptysis and fevers.  Weight is stable. Still c/o taste changes. Feels like he has 10% of the taste.\par Complete review of systems which was performed during a previous encounter was reviewed with the patient and there are no changes except as stated in the HPI section.\par \par \par

## 2019-07-11 NOTE — REVIEW OF SYSTEMS
[Dysphagia: Grade 1 - Symptomatic, able to eat regular diet] : Dysphagia: Grade 1 - Symptomatic, able to eat regular diet [Fatigue: Grade 0] : Fatigue: Grade 0 [Mucositis Oral: Grade 0] : Mucositis Oral: Grade 0  [Dysgeusia: Grade 1- Altered taste but no change in diet] : Dysgeusia: Grade 1 - Altered taste but no change in diet [Xerostomia: Grade 0] : Xerostomia: Grade 0 [Oral Pain: Grade 0] : Oral Pain: Grade 0 [Skin Hyperpigmentation: Grade 0] : Skin Hyperpigmentation: Grade 0 [Dermatitis Radiation: Grade 0] : Dermatitis Radiation: Grade 0

## 2019-07-11 NOTE — PHYSICAL EXAM
[Midline] : trachea located in midline position [Normal] : cranial nerves 2-12 intact [de-identified] : post treatment changes. No discrete mass

## 2019-07-11 NOTE — PHYSICAL EXAM
[Sclera] : the sclera and conjunctiva were normal [Outer Ear] : the ears and nose were normal in appearance [Normal] : no focal deficits

## 2019-07-11 NOTE — CONSULT LETTER
[Dear  ___] : Dear  [unfilled], [Courtesy Letter:] : I had the pleasure of seeing your patient, [unfilled], in my office today. [Please see my note below.] : Please see my note below. [Consult Closing:] : Thank you very much for allowing me to participate in the care of this patient.  If you have any questions, please do not hesitate to contact me. [Sincerely,] : Sincerely, [FreeTextEntry2] : Dr Kamara  [FreeTextEntry3] : \par Rolly Evans MD, FACS\par \par Otolaryngology-Head and Neck Surgery\par [employer] at [facility]\par

## 2019-07-12 NOTE — HISTORY OF PRESENT ILLNESS
[FreeTextEntry1] : Mr. 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 He returns today for follow-up.\par \par CT neck 6/12/19 showed no recurrence\par \par He has no complaints outside of increased phelgm production.

## 2019-07-12 NOTE — END OF VISIT
[] : Resident [FreeTextEntry3] : Agree with assessment and plan.  [>50% of Time Spent on Counseling and Coordination of Care for  ___] : Greater than 50% of the encounter time was spent on counseling and coordination of care for [unfilled] [Time Spent: ___ minutes] : I have spent [unfilled] minutes of face to face time with the patient

## 2019-08-08 ENCOUNTER — APPOINTMENT (OUTPATIENT)
Dept: SPEECH THERAPY | Facility: CLINIC | Age: 64
End: 2019-08-08
Payer: MEDICARE

## 2019-08-08 PROCEDURE — 92610 EVALUATE SWALLOWING FUNCTION: CPT | Mod: GN

## 2019-09-12 ENCOUNTER — APPOINTMENT (OUTPATIENT)
Dept: SPEECH THERAPY | Facility: CLINIC | Age: 64
End: 2019-09-12
Payer: MEDICARE

## 2019-09-12 ENCOUNTER — APPOINTMENT (OUTPATIENT)
Dept: OTOLARYNGOLOGY | Facility: CLINIC | Age: 64
End: 2019-09-12
Payer: MEDICARE

## 2019-09-12 VITALS
WEIGHT: 159 LBS | SYSTOLIC BLOOD PRESSURE: 171 MMHG | BODY MASS INDEX: 22.76 KG/M2 | HEIGHT: 70 IN | HEART RATE: 78 BPM | DIASTOLIC BLOOD PRESSURE: 107 MMHG

## 2019-09-12 PROCEDURE — 31575 DIAGNOSTIC LARYNGOSCOPY: CPT

## 2019-09-12 PROCEDURE — 92526 ORAL FUNCTION THERAPY: CPT | Mod: GN

## 2019-09-12 PROCEDURE — 99214 OFFICE O/P EST MOD 30 MIN: CPT | Mod: 25

## 2019-09-12 NOTE — PHYSICAL EXAM
[de-identified] : post treatment changes. No discrete mass [Midline] : trachea located in midline position [Normal] : orientation to person, place, and time: normal

## 2019-09-12 NOTE — CONSULT LETTER
[Dear  ___] : Dear  [unfilled], [Courtesy Letter:] : I had the pleasure of seeing your patient, [unfilled], in my office today. [Please see my note below.] : Please see my note below. [Sincerely,] : Sincerely, [FreeTextEntry3] : \par Rolly Evans MD, FACS\par \par Otolaryngology-Head and Neck Surgery\par [employer] at [facility]\par  [FreeTextEntry2] : Dr Kamara

## 2019-09-12 NOTE — HISTORY OF PRESENT ILLNESS
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 T2N0M0. PET whole body 3/25/19, overall slow improving. Patient reports decrease in mucus production in throat, clear phlegm. pt notice one episode of blood tingle mucus.  Patient denies dysphagia, odynophagia, pain in throat, choking, aspiration and fevers.  Weight is stable. Still c/o taste changes. Feels like he has 10% of the taste. pt is working with Jane and saw her today, every thing is well and go. Had some streaks of blood in the sputum this morning.\par Complete review of systems which was performed during a previous encounter was reviewed with the patient and there are no changes except as stated in the HPI section.\par \par \par

## 2019-09-19 ENCOUNTER — APPOINTMENT (OUTPATIENT)
Dept: SPEECH THERAPY | Facility: CLINIC | Age: 64
End: 2019-09-19
Payer: MEDICARE

## 2019-09-19 PROCEDURE — 92526 ORAL FUNCTION THERAPY: CPT | Mod: GN

## 2019-10-03 ENCOUNTER — APPOINTMENT (OUTPATIENT)
Dept: SPEECH THERAPY | Facility: CLINIC | Age: 64
End: 2019-10-03

## 2019-10-10 ENCOUNTER — APPOINTMENT (OUTPATIENT)
Dept: OTOLARYNGOLOGY | Facility: CLINIC | Age: 64
End: 2019-10-10
Payer: MEDICARE

## 2019-10-10 VITALS
BODY MASS INDEX: 22.76 KG/M2 | HEIGHT: 70 IN | WEIGHT: 159 LBS | SYSTOLIC BLOOD PRESSURE: 169 MMHG | DIASTOLIC BLOOD PRESSURE: 105 MMHG | HEART RATE: 76 BPM

## 2019-10-10 PROCEDURE — 31575 DIAGNOSTIC LARYNGOSCOPY: CPT

## 2019-10-10 PROCEDURE — 99214 OFFICE O/P EST MOD 30 MIN: CPT | Mod: 25

## 2019-10-10 RX ORDER — FLUCONAZOLE 100 MG/1
100 TABLET ORAL DAILY
Qty: 7 | Refills: 0 | Status: DISCONTINUED | COMMUNITY
Start: 2019-09-12 | End: 2019-10-10

## 2019-10-10 NOTE — CONSULT LETTER
[Dear  ___] : Dear  [unfilled], [Courtesy Letter:] : I had the pleasure of seeing your patient, [unfilled], in my office today. [Please see my note below.] : Please see my note below. [Consult Closing:] : Thank you very much for allowing me to participate in the care of this patient.  If you have any questions, please do not hesitate to contact me. [Sincerely,] : Sincerely, [FreeTextEntry2] : Dr Kamara  [FreeTextEntry3] : \par Rolly Evans MD, FACS\par \par Otolaryngology-Head and Neck Surgery\par [employer] at [facility]\par

## 2019-10-10 NOTE — PHYSICAL EXAM
[Midline] : trachea located in midline position [Normal] : cranial nerves 2-12 intact [de-identified] : post treatment changes. No discrete mass

## 2019-10-10 NOTE — HISTORY OF PRESENT ILLNESS
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 T2N0M0. PET whole body 3/25/19, overall slow improving. Patient reports decrease in mucus production in throat, clear phlegm. pt notice one episode of blood tingle mucus.  Patient denies dysphagia, odynophagia, pain in throat, choking, aspiration and fevers.  Weight is stable. Still c/o taste changes. Feels like he has 10% of the taste. pt is working with [employer].\par pt is here to f/u Blood tinged mucus resolved. pt still has excessive mucus ( light green) secondary to the cold and taking sinus cold otc. but pt usually has clear mucus in the morning,\par Complete review of systems which was performed during a previous encounter was reviewed with the patient and there are no changes except as stated in the HPI section.\par \par \par

## 2019-10-16 ENCOUNTER — OUTPATIENT (OUTPATIENT)
Dept: OUTPATIENT SERVICES | Facility: HOSPITAL | Age: 64
LOS: 1 days | End: 2019-10-16
Payer: MEDICARE

## 2019-10-16 VITALS
RESPIRATION RATE: 16 BRPM | OXYGEN SATURATION: 98 % | TEMPERATURE: 98 F | WEIGHT: 158.95 LBS | DIASTOLIC BLOOD PRESSURE: 90 MMHG | SYSTOLIC BLOOD PRESSURE: 160 MMHG | HEART RATE: 54 BPM | HEIGHT: 69 IN

## 2019-10-16 DIAGNOSIS — C32.9 MALIGNANT NEOPLASM OF LARYNX, UNSPECIFIED: ICD-10-CM

## 2019-10-16 DIAGNOSIS — Z90.49 ACQUIRED ABSENCE OF OTHER SPECIFIED PARTS OF DIGESTIVE TRACT: Chronic | ICD-10-CM

## 2019-10-16 LAB
ALBUMIN SERPL ELPH-MCNC: 4.7 G/DL — SIGNIFICANT CHANGE UP (ref 3.3–5)
ALP SERPL-CCNC: 88 U/L — SIGNIFICANT CHANGE UP (ref 40–120)
ALT FLD-CCNC: 19 U/L — SIGNIFICANT CHANGE UP (ref 4–41)
ANION GAP SERPL CALC-SCNC: 16 MMO/L — HIGH (ref 7–14)
AST SERPL-CCNC: 14 U/L — SIGNIFICANT CHANGE UP (ref 4–40)
BILIRUB SERPL-MCNC: 0.3 MG/DL — SIGNIFICANT CHANGE UP (ref 0.2–1.2)
BUN SERPL-MCNC: 15 MG/DL — SIGNIFICANT CHANGE UP (ref 7–23)
CALCIUM SERPL-MCNC: 10 MG/DL — SIGNIFICANT CHANGE UP (ref 8.4–10.5)
CHLORIDE SERPL-SCNC: 101 MMOL/L — SIGNIFICANT CHANGE UP (ref 98–107)
CO2 SERPL-SCNC: 24 MMOL/L — SIGNIFICANT CHANGE UP (ref 22–31)
CREAT SERPL-MCNC: 0.75 MG/DL — SIGNIFICANT CHANGE UP (ref 0.5–1.3)
GLUCOSE SERPL-MCNC: 80 MG/DL — SIGNIFICANT CHANGE UP (ref 70–99)
HCT VFR BLD CALC: 45.8 % — SIGNIFICANT CHANGE UP (ref 39–50)
HGB BLD-MCNC: 15 G/DL — SIGNIFICANT CHANGE UP (ref 13–17)
MCHC RBC-ENTMCNC: 32.8 % — SIGNIFICANT CHANGE UP (ref 32–36)
MCHC RBC-ENTMCNC: 32.8 PG — SIGNIFICANT CHANGE UP (ref 27–34)
MCV RBC AUTO: 100.2 FL — HIGH (ref 80–100)
NRBC # FLD: 0 K/UL — SIGNIFICANT CHANGE UP (ref 0–0)
PLATELET # BLD AUTO: 301 K/UL — SIGNIFICANT CHANGE UP (ref 150–400)
PMV BLD: 10.2 FL — SIGNIFICANT CHANGE UP (ref 7–13)
POTASSIUM SERPL-MCNC: 3.9 MMOL/L — SIGNIFICANT CHANGE UP (ref 3.5–5.3)
POTASSIUM SERPL-SCNC: 3.9 MMOL/L — SIGNIFICANT CHANGE UP (ref 3.5–5.3)
PROT SERPL-MCNC: 7.7 G/DL — SIGNIFICANT CHANGE UP (ref 6–8.3)
RBC # BLD: 4.57 M/UL — SIGNIFICANT CHANGE UP (ref 4.2–5.8)
RBC # FLD: 12.4 % — SIGNIFICANT CHANGE UP (ref 10.3–14.5)
SODIUM SERPL-SCNC: 141 MMOL/L — SIGNIFICANT CHANGE UP (ref 135–145)
WBC # BLD: 8.28 K/UL — SIGNIFICANT CHANGE UP (ref 3.8–10.5)
WBC # FLD AUTO: 8.28 K/UL — SIGNIFICANT CHANGE UP (ref 3.8–10.5)

## 2019-10-16 PROCEDURE — 93010 ELECTROCARDIOGRAM REPORT: CPT

## 2019-10-16 RX ORDER — SODIUM CHLORIDE 9 MG/ML
1000 INJECTION, SOLUTION INTRAVENOUS
Refills: 0 | Status: DISCONTINUED | OUTPATIENT
Start: 2019-10-30 | End: 2019-11-17

## 2019-10-16 NOTE — H&P PST ADULT - WEIGHT IN LBS
Problem: Patient Care Overview (Adult)  Goal: Plan of Care Review  Outcome: Outcome(s) achieved Date Met:  02/12/17 02/12/17 1111   Coping/Psychosocial Response Interventions   Plan Of Care Reviewed With patient;daughter   Outcome Evaluation   Outcome Summary/Follow up Plan Pt anticiaptes DC home today with assist form family and HHOT/PT. Recommend use of tub bench. AE issued and pt educated on use, as well as places to obtain DME.   Patient Care Overview   Progress progress toward functional goals as expected         Problem: Stroke (Ischemic) (Adult)  Goal: Signs and Symptoms of Listed Potential Problems Will be Absent or Manageable (Stroke)  Outcome: Outcome(s) achieved Date Met:  02/12/17 02/12/17 1111   Stroke (Ischemic)   Problems Assessed (Stroke (Ischemic)/TIA) motor/sensory impairment   Problems Present (Stroke (Ischemic)/TIA) motor/sensory impairment         Problem: Inpatient Occupational Therapy  Goal: Transfer Training Goal 1 LTG- OT  Outcome: Unable to achieve outcome(s) by discharge Date Met:  02/12/17    02/10/17 0909 02/12/17 1111   Transfer Training OT LTG   Transfer Training OT LTG, Date Established 02/10/17 --    Transfer Training OT LTG, Time to Achieve 1 wk --    Transfer Training OT LTG, Activity Type shower chair --    Transfer Training OT LTG, [place] Level conditional independence --    Transfer Training OT LTG, Outcome --  goal not met   Transfer Training OT LTG, Reason Goal Not Met --  discharged from facility       Goal: Safety Awareness Goal LTG- OT  Outcome: Outcome(s) achieved Date Met:  02/12/17    02/10/17 0909 02/12/17 1111   Safety Awareness OT LTG   Safety Awareness OT LTG, Date Established 02/10/17 --    Safety Awareness OT LTG, Time to Achieve 1 wk --    Safety Awareness OT LTG, Activity Type with ADL's;good safety awareness --    Safety Awareness OT LTG, Outcome --  goal met       Goal: Coordination Goal LTG- OT  Outcome: Unable to achieve outcome(s) by discharge  Date Met:  02/12/17    02/10/17 0909 02/12/17 1111   Coordination OT LTG   Coordination OT LTG, Date Established 02/10/17 --    Coordination OT LTG, Time to Achieve 1 wk --    Coordination OT LTG, Activity Type FM task;GM task --    Coordination OT LTG, [place] Level independent --    Coordination OT LTG, Outcome --  goal not met   Coordination OT LTG, Reason Goal Not Met --  discharged from facility       Goal: Functional Mobility Goal LTG- OT  Outcome: Outcome(s) achieved Date Met:  02/12/17    02/10/17 0909 02/12/17 1111   Functional Mobility OT LTG   Functional Mobility Goal OT LTG, Date Established 02/10/17 --    Functional Mobility Goal OT LTG, Time to Achieve 1 wk --    Functional Mobility Goal OT LTG, [place] Level modified independent --    Functional Mobility Goal OT LTG, Distance to Achieve to the bathroom --    Functional Mobility Goal OT LTG, Outcome --  goal met            158.9

## 2019-10-16 NOTE — H&P PST ADULT - NSICDXPROBLEM_GEN_ALL_CORE_FT
PROBLEM DIAGNOSES  Problem: Laryngeal cancer  Assessment and Plan: Pt scheduled for surgery and preop instructions including instructions for taking Famotidine and for Chlorhexidine use in showering on the day of surgery, given verbally and with use of  written materials, and patient confirming understanding of such instructions using  teach back method.  Pt to see Cardio for CC - BP in /90 and abnormal EKG - forms given   OR Booking notified Sleep apnea

## 2019-10-16 NOTE — H&P PST ADULT - NSICDXPASTMEDICALHX_GEN_ALL_CORE_FT
PAST MEDICAL HISTORY:  Laryngeal cancer     No pertinent past medical history PAST MEDICAL HISTORY:  Laryngeal cancer     No pertinent past medical history     Sleep apnea non-compliant

## 2019-10-16 NOTE — H&P PST ADULT - NEGATIVE ENMT SYMPTOMS
no tinnitus/no ear pain/no vertigo/no sinus symptoms/no hearing difficulty/no dysphagia/no throat pain

## 2019-10-16 NOTE — H&P PST ADULT - HISTORY OF PRESENT ILLNESS
Pt is a Pt is a 64 yr old male scheduled for Direct Laryngoscopy with Biopsy with Dr Evans 10/30/19 - pt hx of smoking for 50 yrs and has had RT for laryngeal Ca ending 12/18 - pt had f/u and was noted to have small reddened area on vocal cord and to have evaluation with biopsy. Pt denies dysphagia or pain. Pt hx of untreated HTN and noncompliant with sleep apnea

## 2019-10-29 ENCOUNTER — TRANSCRIPTION ENCOUNTER (OUTPATIENT)
Age: 64
End: 2019-10-29

## 2019-10-30 ENCOUNTER — OUTPATIENT (OUTPATIENT)
Dept: OUTPATIENT SERVICES | Facility: HOSPITAL | Age: 64
LOS: 1 days | Discharge: HOME | End: 2019-10-30
Payer: MEDICARE

## 2019-10-30 ENCOUNTER — RESULT REVIEW (OUTPATIENT)
Age: 64
End: 2019-10-30

## 2019-10-30 ENCOUNTER — APPOINTMENT (OUTPATIENT)
Dept: OTOLARYNGOLOGY | Facility: HOSPITAL | Age: 64
End: 2019-10-30

## 2019-10-30 VITALS
SYSTOLIC BLOOD PRESSURE: 133 MMHG | DIASTOLIC BLOOD PRESSURE: 80 MMHG | HEIGHT: 69 IN | TEMPERATURE: 98 F | HEART RATE: 68 BPM | OXYGEN SATURATION: 98 % | RESPIRATION RATE: 16 BRPM | WEIGHT: 158.95 LBS

## 2019-10-30 DIAGNOSIS — Z90.49 ACQUIRED ABSENCE OF OTHER SPECIFIED PARTS OF DIGESTIVE TRACT: Chronic | ICD-10-CM

## 2019-10-30 DIAGNOSIS — C32.9 MALIGNANT NEOPLASM OF LARYNX, UNSPECIFIED: ICD-10-CM

## 2019-10-30 PROCEDURE — 88305 TISSUE EXAM BY PATHOLOGIST: CPT | Mod: 26

## 2019-10-30 PROCEDURE — 31536 LARYNGOSCOPY W/BX & OP SCOPE: CPT

## 2019-10-30 RX ORDER — SODIUM CHLORIDE 9 MG/ML
1000 INJECTION, SOLUTION INTRAVENOUS
Refills: 0 | Status: DISCONTINUED | OUTPATIENT
Start: 2019-10-30 | End: 2019-11-17

## 2019-10-30 RX ORDER — ACETAMINOPHEN 500 MG
650 TABLET ORAL ONCE
Refills: 0 | Status: DISCONTINUED | OUTPATIENT
Start: 2019-10-30 | End: 2019-11-17

## 2019-10-30 RX ADMIN — SODIUM CHLORIDE 30 MILLILITER(S): 9 INJECTION, SOLUTION INTRAVENOUS at 21:05

## 2019-10-30 NOTE — ASU PREOP CHECKLIST - WARM FLUIDS/WARM BLANKETS
Dx: Apraxia of speech,         Authorized # of Visits:  7         Next MD visit: none scheduled  Fall Risk: standard         Precautions: n/a           Medication Changes since last visit?: No  Pain: 0/10  Subjective: Patient reports no changes since last
no

## 2019-10-30 NOTE — ASU DISCHARGE PLAN (ADULT/PEDIATRIC) - ASU DC SPECIAL INSTRUCTIONSFT
Follow up with Dr. Evans, please call [phone].   Regular diet as tolerated.   Pain control with tylenol and motrin as needed.  Activity as tolerated.

## 2019-10-30 NOTE — ASU DISCHARGE PLAN (ADULT/PEDIATRIC) - CARE PROVIDER_API CALL
Rolly Evans)  Otolaryngology  [address] [place] [address]  Phone: [phone]  Fax: [phone]  Follow Up Time:

## 2019-10-31 VITALS
SYSTOLIC BLOOD PRESSURE: 132 MMHG | RESPIRATION RATE: 15 BRPM | OXYGEN SATURATION: 98 % | HEART RATE: 54 BPM | DIASTOLIC BLOOD PRESSURE: 72 MMHG

## 2019-10-31 PROBLEM — C32.9 MALIGNANT NEOPLASM OF LARYNX, UNSPECIFIED: Chronic | Status: ACTIVE | Noted: 2019-10-16

## 2019-10-31 PROBLEM — G47.30 SLEEP APNEA, UNSPECIFIED: Chronic | Status: ACTIVE | Noted: 2019-10-16

## 2019-10-31 RX ORDER — INFLUENZA VIRUS VACCINE 15; 15; 15; 15 UG/.5ML; UG/.5ML; UG/.5ML; UG/.5ML
0.5 SUSPENSION INTRAMUSCULAR ONCE
Refills: 0 | Status: DISCONTINUED | OUTPATIENT
Start: 2019-10-31 | End: 2019-11-17

## 2019-10-31 NOTE — PROGRESS NOTE ADULT - SUBJECTIVE AND OBJECTIVE BOX
Patient seen and examined, no acute events, admited for overnight obs for PRISCA.     T(C): 36.4 (10-31-19 @ 06:00), Max: 36.7 (10-30-19 @ 17:23)  HR: 57 (10-31-19 @ 08:00) (47 - 68)  BP: 135/73 (10-31-19 @ 08:00) (114/62 - 175/97)  RR: 15 (10-31-19 @ 08:00) (10 - 21)  SpO2: 98% (10-31-19 @ 08:00) (95% - 100%)  Well appearing, NAD  breathing comfortably on RA, no stridor, no stertor  Face symmetric  OC clear, posterior OP without edema, no bleeding or evidence of trauma  Neck soft, flat    A/P: 63yo M s/p DLB with bx, admitted for PRISCA  - regular diet  - pain control  - d/c home today

## 2019-11-14 ENCOUNTER — APPOINTMENT (OUTPATIENT)
Dept: OTOLARYNGOLOGY | Facility: CLINIC | Age: 64
End: 2019-11-14
Payer: MEDICARE

## 2019-11-14 VITALS
HEIGHT: 70 IN | WEIGHT: 159 LBS | HEART RATE: 75 BPM | RESPIRATION RATE: 16 BRPM | SYSTOLIC BLOOD PRESSURE: 166 MMHG | BODY MASS INDEX: 22.76 KG/M2 | OXYGEN SATURATION: 98 % | DIASTOLIC BLOOD PRESSURE: 92 MMHG

## 2019-11-14 PROCEDURE — 31575 DIAGNOSTIC LARYNGOSCOPY: CPT

## 2019-11-14 PROCEDURE — 99214 OFFICE O/P EST MOD 30 MIN: CPT | Mod: 25

## 2019-11-14 NOTE — CONSULT LETTER
[Dear  ___] : Dear  [unfilled], [Courtesy Letter:] : I had the pleasure of seeing your patient, [unfilled], in my office today. [Please see my note below.] : Please see my note below. [Consult Closing:] : Thank you very much for allowing me to participate in the care of this patient.  If you have any questions, please do not hesitate to contact me. [Sincerely,] : Sincerely, [FreeTextEntry2] : Dr Kamara  [FreeTextEntry3] : \par Rolly Evans MD, FACS\par \par Otolaryngology-Head and Neck Surgery\par [employer] [facility]\par

## 2019-11-14 NOTE — HISTORY OF PRESENT ILLNESS
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 T2N0M0. PET whole body 3/25/19, overall slow improving. Patient reports decrease in mucus production in throat, clear phlegm. pt notice one episode of blood tingle mucus.  Patient denies dysphagia, odynophagia, pain in throat, choking, aspiration and fevers.  Weight is stable. Still c/o taste changes. Feels like he has 10% of the taste. pt is working with [employer].\par pt is here to f/u Blood tinged mucus resolved. pt still has excessive mucus ( light green) secondary to the cold and taking sinus cold otc. but pt usually has clear mucus in the morning.\par S/P DL and biopsy. Path report was benign.\par Complete review of systems which was performed during a previous encounter was reviewed with the patient and there are no changes except as stated in the HPI section.\par \par \par

## 2019-11-14 NOTE — PHYSICAL EXAM
[de-identified] : post treatment changes. No discrete mass [Midline] : trachea located in midline position [Normal] : no rashes

## 2019-11-14 NOTE — HISTORY OF PRESENT ILLNESS
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 T2N0M0. PET whole body 3/25/19, overall slow improving. Patient reports decrease in mucus production in throat, clear phlegm. pt notice one episode of blood tingle mucus.  Patient denies dysphagia, odynophagia, pain in throat, choking, aspiration and fevers.  Weight is stable. Still c/o taste changes. Feels like he has 10% of the taste. pt is working with [employer].\par pt is here to f/u Blood tinged mucus resolved. pt still has excessive mucus ( light green) secondary to the cold and taking sinus cold otc. but pt usually has clear mucus in the morning.\par S/P DL and biopsy. Path report was benign.\par Complete review of systems which was performed during a previous encounter was reviewed with the patient and there are no changes except as stated in the HPI section.\par \par \par

## 2019-11-14 NOTE — PHYSICAL EXAM
[de-identified] : post treatment changes. No discrete mass [Midline] : trachea located in midline position [Normal] : no rashes

## 2019-11-14 NOTE — CONSULT LETTER
[Dear  ___] : Dear  [unfilled], [Courtesy Letter:] : I had the pleasure of seeing your patient, [unfilled], in my office today. [Please see my note below.] : Please see my note below. [Consult Closing:] : Thank you very much for allowing me to participate in the care of this patient.  If you have any questions, please do not hesitate to contact me. [Sincerely,] : Sincerely, [FreeTextEntry2] : Dr Kamara  [FreeTextEntry3] : \par Rolly Evans MD, FACS\par \par Otolaryngology-Head and Neck Surgery\par [employer] at [facility]\par

## 2020-01-30 ENCOUNTER — APPOINTMENT (OUTPATIENT)
Dept: OTOLARYNGOLOGY | Facility: CLINIC | Age: 65
End: 2020-01-30
Payer: MEDICARE

## 2020-01-30 VITALS
SYSTOLIC BLOOD PRESSURE: 175 MMHG | DIASTOLIC BLOOD PRESSURE: 91 MMHG | BODY MASS INDEX: 22.76 KG/M2 | HEART RATE: 67 BPM | WEIGHT: 159 LBS | HEIGHT: 70 IN

## 2020-01-30 PROCEDURE — 31575 DIAGNOSTIC LARYNGOSCOPY: CPT

## 2020-01-30 PROCEDURE — 99214 OFFICE O/P EST MOD 30 MIN: CPT | Mod: 25

## 2020-01-30 NOTE — CONSULT LETTER
[Dear  ___] : Dear  [unfilled], [Courtesy Letter:] : I had the pleasure of seeing your patient, [unfilled], in my office today. [Please see my note below.] : Please see my note below. [Consult Closing:] : Thank you very much for allowing me to participate in the care of this patient.  If you have any questions, please do not hesitate to contact me. [Sincerely,] : Sincerely, [FreeTextEntry2] : Dr Kamara  [FreeTextEntry3] : \par Rolly Evans MD, FACS\par \par Otolaryngology-Head and Neck Surgery\par [employer] at [facility]\par

## 2020-01-30 NOTE — HISTORY OF PRESENT ILLNESS
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 T2N0M0. PET whole body 3/25/19, overall slow improving.  Patient denies dysphagia, odynophagia, pain in throat, choking, aspiration and fevers.  Weight is stable.\par 10/2019 DL and biopsy. Path report was benign\par pt c/o taste changes (has 10% of the taste). pt does not feel need to see Jane now.\par Complete review of systems which was performed during a previous encounter was reviewed with the patient and there are no changes except as stated in the HPI section.\par \par \par

## 2020-04-28 ENCOUNTER — TRANSCRIPTION ENCOUNTER (OUTPATIENT)
Age: 65
End: 2020-04-28

## 2020-04-30 ENCOUNTER — APPOINTMENT (OUTPATIENT)
Dept: OTOLARYNGOLOGY | Facility: CLINIC | Age: 65
End: 2020-04-30
Payer: MEDICARE

## 2020-04-30 VITALS — TEMPERATURE: 209.66 F

## 2020-04-30 PROCEDURE — 31575 DIAGNOSTIC LARYNGOSCOPY: CPT

## 2020-04-30 PROCEDURE — 99214 OFFICE O/P EST MOD 30 MIN: CPT | Mod: 25

## 2020-04-30 NOTE — CONSULT LETTER
[Dear  ___] : Dear  [unfilled], [Courtesy Letter:] : I had the pleasure of seeing your patient, [unfilled], in my office today. [Please see my note below.] : Please see my note below. [Consult Closing:] : Thank you very much for allowing me to participate in the care of this patient.  If you have any questions, please do not hesitate to contact me. [Sincerely,] : Sincerely, [FreeTextEntry2] : Dr Kamara  [FreeTextEntry3] : Rolly Evans MD, FACS\par \par Otolaryngology-Head and Neck Surgery\par [employer] at [facility]\par

## 2020-04-30 NOTE — HISTORY OF PRESENT ILLNESS
[de-identified] : Mr. Monroy is a 63 yo male is here for follow up with history of SCCa of left supraglottis.Pt. states feeling well. Pt. has had intermittent cough during the night, waking up with sob and increase secretions. Continous lymphatic drainage at times. Pt. was concerned and went to get tested for covid19  two weeks ago and results came back negative.\par Denies any pain, dysphagia or dyspnea.Dysphonia has improved but has some hoarseness off and on.\par Denies recent  fever or chills. No hemoptysis.\par Complete review of systems which was performed during a previous encounter was reviewed with the patient and there are no changes except as stated in the HPI section.  [Neck Mass] : no neck mass [Fever] : no fever [Difficulty Swallowing] : no difficulty swallowing [Painful Swallowing] : no painful swallowing [Rash] : no rash

## 2020-07-30 ENCOUNTER — APPOINTMENT (OUTPATIENT)
Dept: OTOLARYNGOLOGY | Facility: CLINIC | Age: 65
End: 2020-07-30
Payer: MEDICARE

## 2020-07-30 VITALS
SYSTOLIC BLOOD PRESSURE: 190 MMHG | WEIGHT: 159 LBS | HEART RATE: 68 BPM | DIASTOLIC BLOOD PRESSURE: 100 MMHG | HEIGHT: 70 IN | BODY MASS INDEX: 22.76 KG/M2

## 2020-07-30 PROCEDURE — 99214 OFFICE O/P EST MOD 30 MIN: CPT | Mod: 25

## 2020-07-30 PROCEDURE — 31575 DIAGNOSTIC LARYNGOSCOPY: CPT

## 2020-10-29 ENCOUNTER — APPOINTMENT (OUTPATIENT)
Dept: OTOLARYNGOLOGY | Facility: CLINIC | Age: 65
End: 2020-10-29
Payer: MEDICARE

## 2020-10-29 PROCEDURE — 31575 DIAGNOSTIC LARYNGOSCOPY: CPT

## 2020-10-29 PROCEDURE — 99214 OFFICE O/P EST MOD 30 MIN: CPT | Mod: 25

## 2020-10-29 NOTE — HISTORY OF PRESENT ILLNESS
[de-identified] : Mr. Monroy is a 65 yo male is here for follow up with history of SCCa of left supraglottis.Pt. states feeling well. \par Denies any pain, dysphagia or dyspnea.Dysphonia has improved but has some hoarseness off and on.\par Denies recent fever or chills. No hemoptysis.Weight has been stable.\par Complete review of systems which was performed during a previous encounter was reviewed with the patient and there are no changes except as stated in the HPI section. \par \par Symptoms: The patient is currently asymptomatic., no neck mass, no difficulty swallowing, no fever, no painful swallowing and no rash.

## 2020-10-29 NOTE — CONSULT LETTER
[Dear  ___] : Dear  [unfilled], [Courtesy Letter:] : I had the pleasure of seeing your patient, [unfilled], in my office today. [Please see my note below.] : Please see my note below. [Consult Closing:] : Thank you very much for allowing me to participate in the care of this patient.  If you have any questions, please do not hesitate to contact me. [Sincerely,] : Sincerely, [FreeTextEntry2] : Dr Kamara  [FreeTextEntry3] : \par Rolly Evans MD, FACS\par \par Otolaryngology-Head and Neck Surgery\par [employer] at [facility]\par

## 2020-10-29 NOTE — CONSULT LETTER
[FreeTextEntry2] : Dr Kamara  [FreeTextEntry3] : \par Rolly Evans MD, FACS\par \par Otolaryngology-Head and Neck Surgery\par [employer] at [facility]\par

## 2020-10-29 NOTE — HISTORY OF PRESENT ILLNESS
[de-identified] : Mr. Monroy is a 63 yo male is here for follow up with history of SCCa of left supraglottis.Pt. states feeling well. \par Denies any pain, dysphagia or dyspnea. Dysphonia has improved but has some hoarseness off and on.\par Denies recent fever or chills. No hemoptysis.Weight has been stable. Dryness is much better.\par Complete review of systems which was performed during a previous encounter was reviewed with the patient and there are no changes except as stated in the HPI section.

## 2021-02-04 ENCOUNTER — APPOINTMENT (OUTPATIENT)
Dept: OTOLARYNGOLOGY | Facility: CLINIC | Age: 66
End: 2021-02-04
Payer: MEDICARE

## 2021-02-04 VITALS — HEIGHT: 70 IN | DIASTOLIC BLOOD PRESSURE: 109 MMHG | SYSTOLIC BLOOD PRESSURE: 172 MMHG

## 2021-02-04 PROCEDURE — 31575 DIAGNOSTIC LARYNGOSCOPY: CPT

## 2021-02-04 PROCEDURE — 99214 OFFICE O/P EST MOD 30 MIN: CPT | Mod: 25

## 2021-02-04 NOTE — REASON FOR VISIT
[Subsequent Evaluation] : a subsequent evaluation for [FreeTextEntry2] : SCCa of the left supraglottis. \par

## 2021-02-04 NOTE — CONSULT LETTER
[Dear  ___] : Dear  [unfilled], [Courtesy Letter:] : I had the pleasure of seeing your patient, [unfilled], in my office today. [Please see my note below.] : Please see my note below. [Consult Closing:] : Thank you very much for allowing me to participate in the care of this patient.  If you have any questions, please do not hesitate to contact me. [Sincerely,] : Sincerely, [FreeTextEntry2] : Dr Kamara  [FreeTextEntry3] : \par Rolly Evans MD, FACS\par \par Otolaryngology-Head and Neck Surgery\par [employer] at [facility]\par

## 2021-02-11 ENCOUNTER — OUTPATIENT (OUTPATIENT)
Dept: OUTPATIENT SERVICES | Facility: HOSPITAL | Age: 66
LOS: 1 days | End: 2021-02-11
Payer: MEDICARE

## 2021-02-11 ENCOUNTER — APPOINTMENT (OUTPATIENT)
Dept: CT IMAGING | Facility: IMAGING CENTER | Age: 66
End: 2021-02-11
Payer: MEDICARE

## 2021-02-11 DIAGNOSIS — C32.9 MALIGNANT NEOPLASM OF LARYNX, UNSPECIFIED: ICD-10-CM

## 2021-02-11 DIAGNOSIS — Z90.49 ACQUIRED ABSENCE OF OTHER SPECIFIED PARTS OF DIGESTIVE TRACT: Chronic | ICD-10-CM

## 2021-02-11 PROCEDURE — 70491 CT SOFT TISSUE NECK W/DYE: CPT | Mod: 26,MG

## 2021-02-11 PROCEDURE — G1004: CPT

## 2021-02-11 PROCEDURE — 71260 CT THORAX DX C+: CPT

## 2021-02-11 PROCEDURE — 70491 CT SOFT TISSUE NECK W/DYE: CPT

## 2021-02-11 PROCEDURE — 71260 CT THORAX DX C+: CPT | Mod: 26,MG

## 2021-02-17 ENCOUNTER — NON-APPOINTMENT (OUTPATIENT)
Age: 66
End: 2021-02-17

## 2021-05-06 ENCOUNTER — APPOINTMENT (OUTPATIENT)
Dept: OTOLARYNGOLOGY | Facility: CLINIC | Age: 66
End: 2021-05-06
Payer: MEDICARE

## 2021-05-06 PROCEDURE — 99214 OFFICE O/P EST MOD 30 MIN: CPT | Mod: 25

## 2021-05-06 PROCEDURE — 31575 DIAGNOSTIC LARYNGOSCOPY: CPT

## 2021-05-06 NOTE — HISTORY OF PRESENT ILLNESS
[de-identified] : Mr. Monroy is a 64 yo male is here for 3 month follow up with history of SCCa of left supraglottis. Completed RT  Dec 2018. Today pt denies any pain, dysphagia, dysphonia, or dyspnea. Still with voice hoarseness on/off and phlegm at back of throat, especially in mornings and after big meals. \par Denies recent fever or chills, cough. Weight has been stable. Still smokes occasionally, but less than before. \par \par Complete review of systems which was performed during a previous encounter was reviewed with the patient and there are no changes except as stated in the HPI section. \par

## 2021-05-06 NOTE — CONSULT LETTER
[Dear  ___] : Dear  [unfilled], [Courtesy Letter:] : I had the pleasure of seeing your patient, [unfilled], in my office today. [Please see my note below.] : Please see my note below. [Consult Closing:] : Thank you very much for allowing me to participate in the care of this patient.  If you have any questions, please do not hesitate to contact me. [Sincerely,] : Sincerely, [FreeTextEntry2] : Dr Kamara  [FreeTextEntry3] : \par Rolly Evans MD, FACS\par \par Otolaryngology-Head and Neck Surgery\par [employer] at [facility]\par

## 2021-09-16 ENCOUNTER — APPOINTMENT (OUTPATIENT)
Dept: OTOLARYNGOLOGY | Facility: CLINIC | Age: 66
End: 2021-09-16
Payer: MEDICARE

## 2021-09-16 VITALS — WEIGHT: 165 LBS | BODY MASS INDEX: 23.62 KG/M2 | HEIGHT: 70 IN

## 2021-09-16 PROCEDURE — 31575 DIAGNOSTIC LARYNGOSCOPY: CPT

## 2021-09-16 PROCEDURE — 99214 OFFICE O/P EST MOD 30 MIN: CPT | Mod: 25

## 2021-09-16 NOTE — REASON FOR VISIT
[Subsequent Evaluation] : a subsequent evaluation for [FreeTextEntry2] : follow up SCCa of left supraglottis.

## 2021-09-16 NOTE — HISTORY OF PRESENT ILLNESS
[de-identified] : Mr. Monroy is a 66 yo male is here for follow up with history of SCCa of left supraglottis.Pt. states feeling well. \par Reports intermittent dysphagia with solids. Denies any pain, dyspnea. Dysphonia has improved but has some hoarseness off and on. Denies recent fever or chills. No hemoptysis.Weight has been stable.\par

## 2022-01-13 ENCOUNTER — APPOINTMENT (OUTPATIENT)
Dept: OTOLARYNGOLOGY | Facility: CLINIC | Age: 67
End: 2022-01-13
Payer: MEDICARE

## 2022-01-13 VITALS
WEIGHT: 165 LBS | HEART RATE: 76 BPM | HEIGHT: 71 IN | DIASTOLIC BLOOD PRESSURE: 104 MMHG | SYSTOLIC BLOOD PRESSURE: 178 MMHG | BODY MASS INDEX: 23.1 KG/M2

## 2022-01-13 DIAGNOSIS — C32.9 MALIGNANT NEOPLASM OF LARYNX, UNSPECIFIED: ICD-10-CM

## 2022-01-13 PROCEDURE — 99214 OFFICE O/P EST MOD 30 MIN: CPT | Mod: 25

## 2022-01-13 PROCEDURE — 31575 DIAGNOSTIC LARYNGOSCOPY: CPT

## 2022-01-13 NOTE — CONSULT LETTER
[Dear  ___] : Dear  [unfilled], [Courtesy Letter:] : I had the pleasure of seeing your patient, [unfilled], in my office today. [Please see my note below.] : Please see my note below. [Consult Closing:] : Thank you very much for allowing me to participate in the care of this patient.  If you have any questions, please do not hesitate to contact me. [Sincerely,] : Sincerely, [FreeTextEntry2] : Dr Kamara  [FreeTextEntry3] : \par Rolly Evans MD, FACS\par \par Otolaryngology-Head and Neck Surgery\par [employer] at [facility]\par

## 2022-01-13 NOTE — HISTORY OF PRESENT ILLNESS
[de-identified] : Mr. Monroy is a 63 yo male is here for follow up with history of SCCa of left supraglottis.Pt. states feeling well. \par Denies any pain, dysphagia or dyspnea. Dysphonia has improved but has some hoarseness off and on.\par Denies recent fever or chills. No hemoptysis.Weight has been stable. Taste comes and goes, c/o dry mouth.\par Complete review of systems which was performed during a previous encounter was reviewed with the patient and there are no changes except as stated in the HPI section.

## 2022-02-08 ENCOUNTER — OUTPATIENT (OUTPATIENT)
Dept: OUTPATIENT SERVICES | Facility: HOSPITAL | Age: 67
LOS: 1 days | End: 2022-02-08
Payer: MEDICARE

## 2022-02-08 ENCOUNTER — APPOINTMENT (OUTPATIENT)
Dept: CT IMAGING | Facility: IMAGING CENTER | Age: 67
End: 2022-02-08
Payer: MEDICARE

## 2022-02-08 DIAGNOSIS — Z90.49 ACQUIRED ABSENCE OF OTHER SPECIFIED PARTS OF DIGESTIVE TRACT: Chronic | ICD-10-CM

## 2022-02-08 DIAGNOSIS — C32.9 MALIGNANT NEOPLASM OF LARYNX, UNSPECIFIED: ICD-10-CM

## 2022-02-08 PROCEDURE — G1004: CPT

## 2022-02-08 PROCEDURE — 70491 CT SOFT TISSUE NECK W/DYE: CPT | Mod: 26,MG

## 2022-02-08 PROCEDURE — 71260 CT THORAX DX C+: CPT | Mod: 26,MG

## 2022-02-08 PROCEDURE — 70491 CT SOFT TISSUE NECK W/DYE: CPT | Mod: MG

## 2022-02-08 PROCEDURE — 71260 CT THORAX DX C+: CPT | Mod: MG

## 2022-02-08 PROCEDURE — 82565 ASSAY OF CREATININE: CPT

## 2022-02-10 ENCOUNTER — NON-APPOINTMENT (OUTPATIENT)
Age: 67
End: 2022-02-10

## 2022-04-26 NOTE — HISTORY OF PRESENT ILLNESS
[de-identified] : Mr. Monroy is a 66 yo male is here for 3 month follow up with history of SCCa of left supraglottis. Completed RT  Dec 2018. Reports 2 weeks ago, had an episode of left and right neck pain/soreness, was relieved with Advil cold & sinus after 3 days. C/o feeling for tired and less motivated lately.  \par Today pt denies any pain, dysphagia, dysphonia, or dyspnea. Still with hoarseness off and on.\par Denies recent fever or chills, cough. Weight has been stable. Tolerating eating and drinking without issues. Still smokes occasionally.\par Complete review of systems which was performed during a previous encounter was reviewed with the patient and there are no changes except as stated in the HPI section. \par  no neck pain/no back pain

## 2022-06-02 ENCOUNTER — APPOINTMENT (OUTPATIENT)
Dept: OTOLARYNGOLOGY | Facility: CLINIC | Age: 67
End: 2022-06-02
Payer: MEDICARE

## 2022-06-02 VITALS
DIASTOLIC BLOOD PRESSURE: 108 MMHG | SYSTOLIC BLOOD PRESSURE: 197 MMHG | BODY MASS INDEX: 24.22 KG/M2 | OXYGEN SATURATION: 97 % | HEART RATE: 70 BPM | WEIGHT: 173 LBS | HEIGHT: 71 IN

## 2022-06-02 PROCEDURE — 31575 DIAGNOSTIC LARYNGOSCOPY: CPT

## 2022-06-02 PROCEDURE — 99214 OFFICE O/P EST MOD 30 MIN: CPT | Mod: 25

## 2022-06-02 RX ORDER — PNV NO.95/FERROUS FUM/FOLIC AC 28MG-0.8MG
TABLET ORAL
Refills: 0 | Status: ACTIVE | COMMUNITY

## 2022-06-02 RX ORDER — ASPIRIN 81 MG
81 TABLET, DELAYED RELEASE (ENTERIC COATED) ORAL
Refills: 0 | Status: ACTIVE | COMMUNITY

## 2022-06-02 NOTE — HISTORY OF PRESENT ILLNESS
[de-identified] : Mr. Monroy is a 67 yo male is here for 4 month follow up with history of SCCa of left supraglottis. S/P RT completed on 12/27/18. Last CT chest/neck 2/2022 were stable. \par Today pt denies odynophagia, dyspnea, or odynophagia. C/o voice hoarseness on and off, and phlegm in the morning. \par No fever, chills, weight loss. Eating and drinking without issues. \par BP high in office today; pt states BP is usually high in morning. \par Complete review of systems which was performed during a previous encounter was reviewed with the patient and there are no changes except as stated in the HPI section.\par \par \par CT chest 2/8/2022 (NW):\par IMPRESSION: No intrathoracic metastases.\par \par CT neck 2/8/2022 (NW):\par IMPRESSION: Stable follow-up CT examination without evidence of recurrent or metastatic disease

## 2022-06-02 NOTE — CONSULT LETTER
[Dear  ___] : Dear  [unfilled], [Courtesy Letter:] : I had the pleasure of seeing your patient, [unfilled], in my office today. [Please see my note below.] : Please see my note below. [Consult Closing:] : Thank you very much for allowing me to participate in the care of this patient.  If you have any questions, please do not hesitate to contact me. [Sincerely,] : Sincerely, [FreeTextEntry2] : Dr Kamara  [FreeTextEntry3] : \par Rolly Evans MD, FACS\par \par Otolaryngology-Head and Neck Surgery\par [employer] at [facility]\par

## 2022-12-08 ENCOUNTER — APPOINTMENT (OUTPATIENT)
Dept: OTOLARYNGOLOGY | Facility: CLINIC | Age: 67
End: 2022-12-08
Payer: MEDICARE

## 2022-12-08 PROCEDURE — 99214 OFFICE O/P EST MOD 30 MIN: CPT | Mod: 25

## 2022-12-08 PROCEDURE — 31575 DIAGNOSTIC LARYNGOSCOPY: CPT

## 2022-12-08 NOTE — HISTORY OF PRESENT ILLNESS
[de-identified] : Mr. Monroy is a 65 yo male is here for 6 month follow up with history of SCCa of left supraglottis. S/P RT completed on 12/27/18. Last CT chest/neck 2/2022 without signs of recurrence. \par Today pt denies odynophagia, dyspnea, or odynophagia. C/o voice hoarseness on and off, and phlegm in the morning. \par No fever, chills, weight loss. Eating and drinking without issues.

## 2022-12-08 NOTE — CONSULT LETTER
[Dear  ___] : Dear  [unfilled], [Courtesy Letter:] : I had the pleasure of seeing your patient, [unfilled], in my office today. [Please see my note below.] : Please see my note below. [Consult Closing:] : Thank you very much for allowing me to participate in the care of this patient.  If you have any questions, please do not hesitate to contact me. [Sincerely,] : Sincerely, [FreeTextEntry2] : Dr Kamara  [FreeTextEntry3] : \par Rolly Evans MD, FACS\par \par Otolaryngology-Head and Neck Surgery\par [employer] at [facility]\par

## 2023-01-27 ENCOUNTER — INPATIENT (INPATIENT)
Facility: HOSPITAL | Age: 68
LOS: 4 days | Discharge: HOME | DRG: 871 | End: 2023-02-01
Attending: INTERNAL MEDICINE | Admitting: INTERNAL MEDICINE
Payer: MEDICARE

## 2023-01-27 VITALS
OXYGEN SATURATION: 97 % | HEART RATE: 120 BPM | TEMPERATURE: 101 F | WEIGHT: 179.9 LBS | RESPIRATION RATE: 22 BRPM | DIASTOLIC BLOOD PRESSURE: 125 MMHG | HEIGHT: 71 IN | SYSTOLIC BLOOD PRESSURE: 174 MMHG

## 2023-01-27 DIAGNOSIS — Z90.49 ACQUIRED ABSENCE OF OTHER SPECIFIED PARTS OF DIGESTIVE TRACT: Chronic | ICD-10-CM

## 2023-01-27 DIAGNOSIS — J18.9 PNEUMONIA, UNSPECIFIED ORGANISM: ICD-10-CM

## 2023-01-27 LAB
ALBUMIN SERPL ELPH-MCNC: 3.8 G/DL — SIGNIFICANT CHANGE UP (ref 3.3–5)
ALP SERPL-CCNC: 125 U/L — HIGH (ref 40–120)
ALT FLD-CCNC: 28 U/L — SIGNIFICANT CHANGE UP (ref 10–45)
ANION GAP SERPL CALC-SCNC: 16 MMOL/L — SIGNIFICANT CHANGE UP (ref 5–17)
APPEARANCE UR: ABNORMAL
APTT BLD: 28.4 SEC — SIGNIFICANT CHANGE UP (ref 27.5–35.5)
AST SERPL-CCNC: 15 U/L — SIGNIFICANT CHANGE UP (ref 10–40)
BACTERIA # UR AUTO: NEGATIVE — SIGNIFICANT CHANGE UP
BASE EXCESS BLDV CALC-SCNC: -3.4 MMOL/L — LOW (ref -2–3)
BASOPHILS # BLD AUTO: 0 K/UL — SIGNIFICANT CHANGE UP (ref 0–0.2)
BASOPHILS NFR BLD AUTO: 0 % — SIGNIFICANT CHANGE UP (ref 0–2)
BILIRUB SERPL-MCNC: 0.8 MG/DL — SIGNIFICANT CHANGE UP (ref 0.2–1.2)
BILIRUB UR-MCNC: ABNORMAL
BLOOD GAS VENOUS - CREATININE: SIGNIFICANT CHANGE UP MG/DL (ref 0.5–1.3)
BUN SERPL-MCNC: 18 MG/DL — SIGNIFICANT CHANGE UP (ref 7–23)
CA-I SERPL-SCNC: 1.02 MMOL/L — LOW (ref 1.15–1.33)
CALCIUM SERPL-MCNC: 9.7 MG/DL — SIGNIFICANT CHANGE UP (ref 8.4–10.5)
CHLORIDE BLDV-SCNC: 101 MMOL/L — SIGNIFICANT CHANGE UP (ref 96–108)
CHLORIDE SERPL-SCNC: 100 MMOL/L — SIGNIFICANT CHANGE UP (ref 96–108)
CO2 BLDV-SCNC: 25 MMOL/L — SIGNIFICANT CHANGE UP (ref 22–26)
CO2 SERPL-SCNC: 22 MMOL/L — SIGNIFICANT CHANGE UP (ref 22–31)
COLOR SPEC: ABNORMAL
CREAT SERPL-MCNC: 0.72 MG/DL — SIGNIFICANT CHANGE UP (ref 0.5–1.3)
DIFF PNL FLD: ABNORMAL
EGFR: 99 ML/MIN/1.73M2 — SIGNIFICANT CHANGE UP
EOSINOPHIL # BLD AUTO: 0 K/UL — SIGNIFICANT CHANGE UP (ref 0–0.5)
EOSINOPHIL NFR BLD AUTO: 0 % — SIGNIFICANT CHANGE UP (ref 0–6)
EPI CELLS # UR: 9 /HPF — HIGH
GAS PNL BLDV: 124 MMOL/L — LOW (ref 136–145)
GAS PNL BLDV: SIGNIFICANT CHANGE UP
GAS PNL BLDV: SIGNIFICANT CHANGE UP
GLUCOSE BLDV-MCNC: 105 MG/DL — HIGH (ref 70–99)
GLUCOSE SERPL-MCNC: 121 MG/DL — HIGH (ref 70–99)
GLUCOSE UR QL: NEGATIVE — SIGNIFICANT CHANGE UP
HCO3 BLDV-SCNC: 24 MMOL/L — SIGNIFICANT CHANGE UP (ref 22–29)
HCT VFR BLD CALC: 42.9 % — SIGNIFICANT CHANGE UP (ref 39–50)
HCT VFR BLDA CALC: 44 % — SIGNIFICANT CHANGE UP (ref 39–51)
HGB BLD CALC-MCNC: 14.5 G/DL — SIGNIFICANT CHANGE UP (ref 12.6–17.4)
HGB BLD-MCNC: 14.3 G/DL — SIGNIFICANT CHANGE UP (ref 13–17)
HYALINE CASTS # UR AUTO: 2 /LPF — SIGNIFICANT CHANGE UP (ref 0–7)
INR BLD: 1.23 RATIO — HIGH (ref 0.88–1.16)
KETONES UR-MCNC: ABNORMAL
LACTATE BLDV-MCNC: 3.7 MMOL/L — HIGH (ref 0.5–2)
LACTATE SERPL-SCNC: 1.2 MMOL/L — SIGNIFICANT CHANGE UP (ref 0.5–2)
LEUKOCYTE ESTERASE UR-ACNC: NEGATIVE — SIGNIFICANT CHANGE UP
LYMPHOCYTES # BLD AUTO: 0.36 K/UL — LOW (ref 1–3.3)
LYMPHOCYTES # BLD AUTO: 1.7 % — LOW (ref 13–44)
MANUAL SMEAR VERIFICATION: SIGNIFICANT CHANGE UP
MCHC RBC-ENTMCNC: 33.3 GM/DL — SIGNIFICANT CHANGE UP (ref 32–36)
MCHC RBC-ENTMCNC: 33.4 PG — SIGNIFICANT CHANGE UP (ref 27–34)
MCV RBC AUTO: 100.2 FL — HIGH (ref 80–100)
MONOCYTES # BLD AUTO: 1.09 K/UL — HIGH (ref 0–0.9)
MONOCYTES NFR BLD AUTO: 5.2 % — SIGNIFICANT CHANGE UP (ref 2–14)
NEUTROPHILS # BLD AUTO: 19.55 K/UL — HIGH (ref 1.8–7.4)
NEUTROPHILS NFR BLD AUTO: 89.6 % — HIGH (ref 43–77)
NEUTS BAND # BLD: 3.5 % — SIGNIFICANT CHANGE UP (ref 0–8)
NITRITE UR-MCNC: NEGATIVE — SIGNIFICANT CHANGE UP
PCO2 BLDV: 49 MMHG — SIGNIFICANT CHANGE UP (ref 42–55)
PH BLDV: 7.29 — LOW (ref 7.32–7.43)
PH UR: 6 — SIGNIFICANT CHANGE UP (ref 5–8)
PLAT MORPH BLD: NORMAL — SIGNIFICANT CHANGE UP
PLATELET # BLD AUTO: 250 K/UL — SIGNIFICANT CHANGE UP (ref 150–400)
PO2 BLDV: 49 MMHG — HIGH (ref 25–45)
POTASSIUM BLDV-SCNC: >10 MMOL/L — CRITICAL HIGH (ref 3.5–5.1)
POTASSIUM SERPL-MCNC: 3.8 MMOL/L — SIGNIFICANT CHANGE UP (ref 3.5–5.3)
POTASSIUM SERPL-SCNC: 3.8 MMOL/L — SIGNIFICANT CHANGE UP (ref 3.5–5.3)
PROT SERPL-MCNC: 7.5 G/DL — SIGNIFICANT CHANGE UP (ref 6–8.3)
PROT UR-MCNC: ABNORMAL
PROTHROM AB SERPL-ACNC: 14.3 SEC — HIGH (ref 10.5–13.4)
RAPID RVP RESULT: DETECTED
RBC # BLD: 4.28 M/UL — SIGNIFICANT CHANGE UP (ref 4.2–5.8)
RBC # FLD: 12.3 % — SIGNIFICANT CHANGE UP (ref 10.3–14.5)
RBC BLD AUTO: SIGNIFICANT CHANGE UP
RBC CASTS # UR COMP ASSIST: 7 /HPF — HIGH (ref 0–4)
RV+EV RNA SPEC QL NAA+PROBE: DETECTED
SAO2 % BLDV: 81.1 % — SIGNIFICANT CHANGE UP (ref 67–88)
SARS-COV-2 RNA SPEC QL NAA+PROBE: SIGNIFICANT CHANGE UP
SODIUM SERPL-SCNC: 138 MMOL/L — SIGNIFICANT CHANGE UP (ref 135–145)
SP GR SPEC: 1.04 — HIGH (ref 1.01–1.02)
TSH SERPL-MCNC: 0.74 UIU/ML — SIGNIFICANT CHANGE UP (ref 0.27–4.2)
UROBILINOGEN FLD QL: ABNORMAL
WBC # BLD: 21 K/UL — HIGH (ref 3.8–10.5)
WBC # FLD AUTO: 21 K/UL — HIGH (ref 3.8–10.5)
WBC UR QL: 11 /HPF — HIGH (ref 0–5)

## 2023-01-27 PROCEDURE — 99285 EMERGENCY DEPT VISIT HI MDM: CPT

## 2023-01-27 PROCEDURE — 71046 X-RAY EXAM CHEST 2 VIEWS: CPT | Mod: 26

## 2023-01-27 PROCEDURE — 71275 CT ANGIOGRAPHY CHEST: CPT | Mod: 26,MA

## 2023-01-27 RX ORDER — AZITHROMYCIN 500 MG/1
500 TABLET, FILM COATED ORAL ONCE
Refills: 0 | Status: COMPLETED | OUTPATIENT
Start: 2023-01-27 | End: 2023-01-27

## 2023-01-27 RX ORDER — CEFTRIAXONE 500 MG/1
1000 INJECTION, POWDER, FOR SOLUTION INTRAMUSCULAR; INTRAVENOUS ONCE
Refills: 0 | Status: COMPLETED | OUTPATIENT
Start: 2023-01-27 | End: 2023-01-27

## 2023-01-27 RX ORDER — AZITHROMYCIN 500 MG/1
500 TABLET, FILM COATED ORAL EVERY 24 HOURS
Refills: 0 | Status: DISCONTINUED | OUTPATIENT
Start: 2023-01-28 | End: 2023-02-01

## 2023-01-27 RX ORDER — SODIUM CHLORIDE 9 MG/ML
1000 INJECTION, SOLUTION INTRAVENOUS ONCE
Refills: 0 | Status: COMPLETED | OUTPATIENT
Start: 2023-01-27 | End: 2023-01-27

## 2023-01-27 RX ORDER — HEPARIN SODIUM 5000 [USP'U]/ML
5000 INJECTION INTRAVENOUS; SUBCUTANEOUS EVERY 12 HOURS
Refills: 0 | Status: DISCONTINUED | OUTPATIENT
Start: 2023-01-27 | End: 2023-02-01

## 2023-01-27 RX ORDER — CEFTRIAXONE 500 MG/1
1000 INJECTION, POWDER, FOR SOLUTION INTRAMUSCULAR; INTRAVENOUS EVERY 24 HOURS
Refills: 0 | Status: DISCONTINUED | OUTPATIENT
Start: 2023-01-27 | End: 2023-02-01

## 2023-01-27 RX ORDER — PANTOPRAZOLE SODIUM 20 MG/1
40 TABLET, DELAYED RELEASE ORAL
Refills: 0 | Status: DISCONTINUED | OUTPATIENT
Start: 2023-01-27 | End: 2023-02-01

## 2023-01-27 RX ORDER — ACETAMINOPHEN 500 MG
975 TABLET ORAL ONCE
Refills: 0 | Status: COMPLETED | OUTPATIENT
Start: 2023-01-27 | End: 2023-01-27

## 2023-01-27 RX ORDER — IPRATROPIUM/ALBUTEROL SULFATE 18-103MCG
3 AEROSOL WITH ADAPTER (GRAM) INHALATION EVERY 6 HOURS
Refills: 0 | Status: DISCONTINUED | OUTPATIENT
Start: 2023-01-27 | End: 2023-02-01

## 2023-01-27 RX ORDER — AZITHROMYCIN 500 MG/1
TABLET, FILM COATED ORAL
Refills: 0 | Status: DISCONTINUED | OUTPATIENT
Start: 2023-01-27 | End: 2023-02-01

## 2023-01-27 RX ADMIN — AZITHROMYCIN 255 MILLIGRAM(S): 500 TABLET, FILM COATED ORAL at 23:05

## 2023-01-27 RX ADMIN — CEFTRIAXONE 100 MILLIGRAM(S): 500 INJECTION, POWDER, FOR SOLUTION INTRAMUSCULAR; INTRAVENOUS at 13:59

## 2023-01-27 RX ADMIN — Medication 3 MILLILITER(S): at 23:06

## 2023-01-27 RX ADMIN — Medication 100 MILLIGRAM(S): at 14:38

## 2023-01-27 RX ADMIN — Medication 975 MILLIGRAM(S): at 14:00

## 2023-01-27 RX ADMIN — SODIUM CHLORIDE 1000 MILLILITER(S): 9 INJECTION, SOLUTION INTRAVENOUS at 14:05

## 2023-01-27 NOTE — H&P ADULT - HISTORY OF PRESENT ILLNESS
68 yo M, hx of laryngeal cancer s/p radiation, presents with shortness of breath for 4 days. Reports that beginning Monday, he began to feel unwell and shortness of breath with exertion. Progressively worsening to the point he feels weak and tired with eating and traveling to the bathroom. Shortness of breath at rest but more so with exertion. Also with cough producing "lime-colored" phlegm.

## 2023-01-27 NOTE — H&P ADULT - NSHPLABSRESULTS_GEN_ALL_CORE
14.3   21.00 )-----------( 250      ( 27 Jan 2023 14:45 )             42.9   01-27    138  |  100  |  18  ----------------------------<  121<H>  3.8   |  22  |  0.72    Ca    9.7      27 Jan 2023 14:45    TPro  7.5  /  Alb  3.8  /  TBili  0.8  /  DBili  x   /  AST  15  /  ALT  28  /  AlkPhos  125<H>  01-27

## 2023-01-27 NOTE — ED ADULT NURSE REASSESSMENT NOTE - NS ED NURSE REASSESS COMMENT FT1
Pt completed both rounds of antibiotics. Pt states he is feeling "a little better." Pt a febrile at this time. Pt sitting comfortably in stretcher.

## 2023-01-27 NOTE — ED PROVIDER NOTE - OBJECTIVE STATEMENT
66 yo M, hx of laryngeal cancer s/p radiation, presents with shortness of breath for 4 days. Reports that beginning Monday, he began to feel unwell and shortness of breath with exertion. Progressively worsening to the point he feels weak and tired with eating and traveling to the bathroom. Shortness of breath at rest but more so with exertion. Also with cough producing "lime-colored" phlegm.

## 2023-01-27 NOTE — ED PROVIDER NOTE - NS ED ROS FT
CONSTITUTIONAL: +fevers, no chills, no lightheadedness, no dizziness  EYES: no visual changes, no eye pain  EARS: no ear drainage, no ear pain, no change in hearing  NOSE: no nasal congestion  MOUTH/THROAT: no sore throat  CV: No chest pain, no palpitations  RESP: +SOB, +cough  GI: No n/v/d, no abd pain  : no dysuria, no hematuria, no flank pain  MSK: no back pain, no extremity pain  SKIN: no rashes  NEURO: no headache, no focal weakness, no decreased sensation/parasthesias   PSYCHIATRIC: no known mental health issues

## 2023-01-27 NOTE — H&P ADULT - ASSESSMENT
A/P   SOB / multilobar PNA / sepsis   -started on rocephine / zithromax   check urine legionella antigen   neb rx   f/u blood c/s   pul consult Dr. jacobs     Pleuritic chest pain :  -likely 2/2 PNA     Hx of laryngeal ca : s/p radiation therapy 4 yrs ago   in remission     advance care planning : d/w patinet regarding advance directive. d/w him regarding intubation / CPR if need arise. remain full code. agrees for everything. Time spend 20 min

## 2023-01-27 NOTE — ED ADULT NURSE NOTE - OBJECTIVE STATEMENT
Patient BIBA from home. Received alert & oriented x4. Complaining of cough & decreased oral intake x 5 days. Denies fever, chest pain or dizziness. As per EMS oxygen sat-88% RA & placed on 3 liters NC & sat to 97%. On examination patient noticed to breathing heavier on exertion. On RA 91% & placed back to 3 liters NC. On continuous cardiac monitoring. HR-122/min EKG done.

## 2023-01-27 NOTE — ED ADULT NURSE NOTE - NSIMPLEMENTINTERV_GEN_ALL_ED
Implemented All Fall with Harm Risk Interventions:  [place] to call system. Call bell, personal items and telephone within reach. Instruct patient to call for assistance. Room bathroom lighting operational. Non-slip footwear when patient is off stretcher. Physically safe environment: no spills, clutter or unnecessary equipment. Stretcher in lowest position, wheels locked, appropriate side rails in place. Provide visual cue, wrist band, yellow gown, etc. Monitor gait and stability. Monitor for mental status changes and reorient to person, place, and time. Review medications for side effects contributing to fall risk. Reinforce activity limits and safety measures with patient and family. Provide visual clues: red socks.

## 2023-01-27 NOTE — ED PROVIDER NOTE - ATTENDING CONTRIBUTION TO CARE
MD Ann:  patient seen and evaluated with the resident.  I was present for key portions of the History & Physical, and I agree with the Impression & Plan.  MD Ann:  59-year-old male, brought in by EMS for evaluation of cough, green sputum, fever, shortness of breath  Duration 3 days of cough.  Context: Remote history of laryngeal cancer, treated with radiation; no surgery.  Last treatment 4 years ago.  Patient c/o extremely shortness of breath, to the point where he called 911.  Son is the local .  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  Associated symptoms: No nausea no vomiting, no chest pain  Vital signs: Heart rate 120s, rectal temperature 102.0, O2 sat 92% on room air 99% on 5 L:  General: Adult male mild respiratory distress, clinically improved with supplemental oxygen,  CV tachycardic  Abdomen soft nondistended nontender  Extremities no edema  Impression:  H&P concerning for PNA vs viral infection.  Suspicion for PE low, given Hx of greeen sputum, fever, hypoxia.  WIll empirically treat with Abx, blood Cx, RVP, reassess.     ECG independently reviewed by me, and shows sinus tachycardia, left anterior fascicular block, .

## 2023-01-27 NOTE — H&P ADULT - NSHPPHYSICALEXAM_GEN_ALL_CORE
pt. seen and examined     Vital Signs Last 24 Hrs  T(C): 36.6 (28 Jan 2023 00:37), Max: 38.2 (27 Jan 2023 12:18)  T(F): 97.8 (28 Jan 2023 00:37), Max: 100.7 (27 Jan 2023 12:18)  HR: 97 (28 Jan 2023 00:37) (97 - 122)  BP: 123/87 (28 Jan 2023 00:37) (123/87 - 174/125)  BP(mean): 100 (27 Jan 2023 19:36) (100 - 100)  RR: 20 (28 Jan 2023 00:37) (20 - 23)  SpO2: 96% (28 Jan 2023 00:37) (91% - 98%)    Parameters below as of 28 Jan 2023 00:37  Patient On (Oxygen Delivery Method): nasal cannula  O2 Flow (L/min): 3    heent : nc/at   neck :supple, no JVD  lungs : B/L fair A/E , B/L scattered ronchi  heart: s1s2 nml  abd : soft, NABS, NT/ND  ext : no e/c/c, pulses 2 +  neuro: aaox3 , no focal deficit

## 2023-01-27 NOTE — ED ADULT NURSE REASSESSMENT NOTE - NS ED NURSE REASSESS COMMENT FT1
Report received from MELISA Regan. Pt A&Ox4. Does not appear to be in any acute distress. Pt on 3L NC 9O2 97%. VS documented. Pending dispo. Report received from MELISA Valencia. Pt A&Ox4. Does not appear to be in any acute distress. Pt on 3L NC 9O2 97%. VS documented. Pending dispo.

## 2023-01-27 NOTE — ED PROVIDER NOTE - PHYSICAL EXAMINATION
Physical Exam:  Gen: NAD, AOx3, non-toxic appearing  Head: NCAT  HEENT: EOMI, PEERLA, normal conjunctiva, tongue midline, oral mucosa moist  Lung: tachypneic, mild respiratory distress, no wheezes/rhonchi/rales B/L, speaking in full sentences  CV: Tachycardic no murmurs, rubs or gallops  Abd: soft, NT, ND, no guarding, no rigidity, no rebound tenderness, no CVA tenderness   MSK: no visible deformities, ROM normal in UE/LE, no back pain  Neuro: No focal sensory or motor deficits  Skin: Warm, well perfused, no rash, no leg swelling  Psych: normal affect, calm

## 2023-01-28 LAB
ALBUMIN SERPL ELPH-MCNC: 3.4 G/DL — SIGNIFICANT CHANGE UP (ref 3.3–5)
ALP SERPL-CCNC: 116 U/L — SIGNIFICANT CHANGE UP (ref 40–120)
ALT FLD-CCNC: 25 U/L — SIGNIFICANT CHANGE UP (ref 10–45)
ANION GAP SERPL CALC-SCNC: 13 MMOL/L — SIGNIFICANT CHANGE UP (ref 5–17)
AST SERPL-CCNC: 14 U/L — SIGNIFICANT CHANGE UP (ref 10–40)
BILIRUB SERPL-MCNC: 0.6 MG/DL — SIGNIFICANT CHANGE UP (ref 0.2–1.2)
BUN SERPL-MCNC: 17 MG/DL — SIGNIFICANT CHANGE UP (ref 7–23)
CALCIUM SERPL-MCNC: 9.3 MG/DL — SIGNIFICANT CHANGE UP (ref 8.4–10.5)
CHLORIDE SERPL-SCNC: 101 MMOL/L — SIGNIFICANT CHANGE UP (ref 96–108)
CO2 SERPL-SCNC: 22 MMOL/L — SIGNIFICANT CHANGE UP (ref 22–31)
CREAT SERPL-MCNC: 0.61 MG/DL — SIGNIFICANT CHANGE UP (ref 0.5–1.3)
CULTURE RESULTS: NO GROWTH — SIGNIFICANT CHANGE UP
EGFR: 104 ML/MIN/1.73M2 — SIGNIFICANT CHANGE UP
GLUCOSE SERPL-MCNC: 104 MG/DL — HIGH (ref 70–99)
HCT VFR BLD CALC: 38.5 % — LOW (ref 39–50)
HGB BLD-MCNC: 12.8 G/DL — LOW (ref 13–17)
MCHC RBC-ENTMCNC: 33 PG — SIGNIFICANT CHANGE UP (ref 27–34)
MCHC RBC-ENTMCNC: 33.2 GM/DL — SIGNIFICANT CHANGE UP (ref 32–36)
MCV RBC AUTO: 99.2 FL — SIGNIFICANT CHANGE UP (ref 80–100)
NRBC # BLD: 0 /100 WBCS — SIGNIFICANT CHANGE UP (ref 0–0)
PLATELET # BLD AUTO: 236 K/UL — SIGNIFICANT CHANGE UP (ref 150–400)
POTASSIUM SERPL-MCNC: 3.8 MMOL/L — SIGNIFICANT CHANGE UP (ref 3.5–5.3)
POTASSIUM SERPL-SCNC: 3.8 MMOL/L — SIGNIFICANT CHANGE UP (ref 3.5–5.3)
PROT SERPL-MCNC: 7 G/DL — SIGNIFICANT CHANGE UP (ref 6–8.3)
RBC # BLD: 3.88 M/UL — LOW (ref 4.2–5.8)
RBC # FLD: 12.4 % — SIGNIFICANT CHANGE UP (ref 10.3–14.5)
SODIUM SERPL-SCNC: 136 MMOL/L — SIGNIFICANT CHANGE UP (ref 135–145)
SPECIMEN SOURCE: SIGNIFICANT CHANGE UP
WBC # BLD: 15.42 K/UL — HIGH (ref 3.8–10.5)
WBC # FLD AUTO: 15.42 K/UL — HIGH (ref 3.8–10.5)

## 2023-01-28 RX ORDER — LANOLIN ALCOHOL/MO/W.PET/CERES
3 CREAM (GRAM) TOPICAL ONCE
Refills: 0 | Status: COMPLETED | OUTPATIENT
Start: 2023-01-28 | End: 2023-01-28

## 2023-01-28 RX ORDER — TIOTROPIUM BROMIDE 18 UG/1
2 CAPSULE ORAL; RESPIRATORY (INHALATION) DAILY
Refills: 0 | Status: DISCONTINUED | OUTPATIENT
Start: 2023-01-28 | End: 2023-02-01

## 2023-01-28 RX ORDER — LANOLIN ALCOHOL/MO/W.PET/CERES
3 CREAM (GRAM) TOPICAL AT BEDTIME
Refills: 0 | Status: DISCONTINUED | OUTPATIENT
Start: 2023-01-28 | End: 2023-02-01

## 2023-01-28 RX ORDER — INFLUENZA VIRUS VACCINE 15; 15; 15; 15 UG/.5ML; UG/.5ML; UG/.5ML; UG/.5ML
0.7 SUSPENSION INTRAMUSCULAR ONCE
Refills: 0 | Status: COMPLETED | OUTPATIENT
Start: 2023-01-28 | End: 2023-01-28

## 2023-01-28 RX ADMIN — Medication 3 MILLIGRAM(S): at 01:46

## 2023-01-28 RX ADMIN — Medication 3 MILLILITER(S): at 18:09

## 2023-01-28 RX ADMIN — Medication 3 MILLILITER(S): at 23:10

## 2023-01-28 RX ADMIN — Medication 3 MILLILITER(S): at 05:46

## 2023-01-28 RX ADMIN — CEFTRIAXONE 100 MILLIGRAM(S): 500 INJECTION, POWDER, FOR SOLUTION INTRAMUSCULAR; INTRAVENOUS at 15:02

## 2023-01-28 RX ADMIN — AZITHROMYCIN 255 MILLIGRAM(S): 500 TABLET, FILM COATED ORAL at 23:14

## 2023-01-28 RX ADMIN — HEPARIN SODIUM 5000 UNIT(S): 5000 INJECTION INTRAVENOUS; SUBCUTANEOUS at 05:46

## 2023-01-28 RX ADMIN — Medication 3 MILLILITER(S): at 11:57

## 2023-01-28 RX ADMIN — HEPARIN SODIUM 5000 UNIT(S): 5000 INJECTION INTRAVENOUS; SUBCUTANEOUS at 18:08

## 2023-01-28 RX ADMIN — PANTOPRAZOLE SODIUM 40 MILLIGRAM(S): 20 TABLET, DELAYED RELEASE ORAL at 05:46

## 2023-01-28 NOTE — CONSULT NOTE ADULT - SUBJECTIVE AND OBJECTIVE BOX
CARDIOLOGY CONSULT NOTE - DR. PIERCE    HPI:   68 yo M, hx of laryngeal cancer s/p radiation, presents with shortness of breath for 4 days. Reports that beginning Monday, he began to feel unwell and shortness of breath with exertion. Progressively worsening to the point he feels weak and tired with eating and traveling to the bathroom. Shortness of breath at rest but more so with exertion. Also with cough producing "lime-colored" phlegm. (27 Jan 2023 19:41)      some pleuritic chest pain  no in sob    PAST MEDICAL & SURGICAL HISTORY:  Laryngeal cancer      Sleep apnea  non-compliant      S/P cholecystectomy  laparoscopic 2008            PREVIOUS DIAGNOSTIC TESTING:    [ ] Echocardiogram:  [ ]  Catheterization:  [ ] Stress Test:  	    MEDICATIONS:    Home Medications:      MEDICATIONS  (STANDING):  albuterol/ipratropium for Nebulization 3 milliLiter(s) Nebulizer every 6 hours  azithromycin  IVPB 500 milliGRAM(s) IV Intermittent every 24 hours  azithromycin  IVPB      cefTRIAXone   IVPB 1000 milliGRAM(s) IV Intermittent every 24 hours  heparin   Injectable 5000 Unit(s) SubCutaneous every 12 hours  pantoprazole    Tablet 40 milliGRAM(s) Oral before breakfast      FAMILY HISTORY:  FH: breast cancer (Mother)        SOCIAL HISTORY:    [x ] Non-smoker  [ ] Smoker  [ ] Alcohol    Allergies    No Known Allergies    Intolerances    	    REVIEW OF SYSTEMS:  CONSTITUTIONAL: No fever, weight loss, or fatigue  EYES: No eye pain, visual disturbances, or discharge  ENMT:  No difficulty hearing, tinnitus, vertigo; No sinus or throat pain  NECK: No pain or stiffness  RESPIRATORY: + cough, wheezing, chills or hemoptysis; + Shortness of Breath  CARDIOVASCULAR: as HPI  GASTROINTESTINAL: No abdominal or epigastric pain. No nausea, vomiting, or hematemesis; No diarrhea or constipation. No melena or hematochezia.  GENITOURINARY: No dysuria, frequency, hematuria, or incontinence  NEUROLOGICAL: No headaches, memory loss, loss of strength, numbness, or tremors  SKIN: No itching, burning, rashes, or lesions   	  [ ] All others negative	  [ ] Unable to obtain    PHYSICAL EXAM:    T(C): 36.9 (01-28-23 @ 04:40), Max: 37.9 (01-27-23 @ 12:50)  HR: 84 (01-28-23 @ 04:40) (84 - 122)  BP: 132/83 (01-28-23 @ 04:40) (123/87 - 151/100)  RR: 18 (01-28-23 @ 04:40) (18 - 23)  SpO2: 96% (01-28-23 @ 04:40) (91% - 98%)  Wt(kg): --  I&O's Summary    Daily Height in cm: 180.34 (28 Jan 2023 00:37)    Daily     Appearance: Normal	  Psychiatry: A & O x 3, Mood & affect appropriate  HEENT:   Normal oral mucosa, PERRL, EOMI	  Lymphatic: No lymphadenopathy  Cardiovascular: Normal S1 S2,RRR, No JVD, No murmurs  Respiratory: Lungs clear to auscultation	  Gastrointestinal:  Soft, Non-tender, + BS	  Skin: No rashes, No ecchymoses, No cyanosis	  Neurologic: Non-focal  Extremities: Normal range of motion, No clubbing, cyanosis or edema  Vascular: Peripheral pulses palpable 2+ bilaterally    TELEMETRY: 	    ECG:  	  RADIOLOGY:  OTHER: 	  	  LABS:	 	    CARDIAC MARKERS:        proBNP:     Lipid Profile:   HgA1c:   TSH: Thyroid Stimulating Hormone, Serum: 0.74 uIU/mL (01-27-23 @ 14:45)                            12.8   15.42 )-----------( 236      ( 28 Jan 2023 06:23 )             38.5     01-28    136  |  101  |  17  ----------------------------<  104<H>  3.8   |  22  |  0.61    Ca    9.3      28 Jan 2023 06:20    TPro  7.0  /  Alb  3.4  /  TBili  0.6  /  DBili  x   /  AST  14  /  ALT  25  /  AlkPhos  116  01-28    PT/INR - ( 27 Jan 2023 14:45 )   PT: 14.3 sec;   INR: 1.23 ratio         PTT - ( 27 Jan 2023 14:45 )  PTT:28.4 sec    Creatinine, Serum: 0.61 mg/dL (01-28-23 @ 06:20)  Creatinine, Serum: 0.72 mg/dL (01-27-23 @ 14:45)        ASSESSMENT/PLAN: 	              
PULMONARY CONSULT    Initial HPI on admission:  HPI:   68 yo M, hx of laryngeal cancer s/p radiation, presents with shortness of breath for 4 days. Reports that beginning Monday, he began to feel unwell and shortness of breath with exertion. Progressively worsening to the point he feels weak and tired with eating and traveling to the bathroom. Shortness of breath at rest but more so with exertion. Also with cough producing "lime-colored" phlegm. (2023 19:41)      BRIEF HOSPITAL COURSE: emphysema/PNA found on CT.     PAST MEDICAL & SURGICAL HISTORY:  Laryngeal cancer      Sleep apnea  non-compliant      S/P cholecystectomy  laparoscopic 2008        Allergies    No Known Allergies    Intolerances      FAMILY HISTORY:  FH: breast cancer (Mother)      Social history: former smoker, quit recently    Review of Systems:  CONSTITUTIONAL: No fever, chills, or fatigue  EYES: No eye pain, visual disturbances, or discharge  ENMT:  No difficulty hearing, tinnitus, vertigo; No sinus or throat pain  NECK: No pain or stiffness  RESPIRATORY: Per above  CARDIOVASCULAR: No chest pain, palpitations, dizziness, or leg swelling  GASTROINTESTINAL: No abdominal or epigastric pain. No nausea, vomiting, or hematemesis; No diarrhea or constipation. No melena or hematochezia.  GENITOURINARY: No dysuria, frequency, hematuria, or incontinence  NEUROLOGICAL: No headaches, memory loss, loss of strength, numbness, or tremors  SKIN: No itching, burning, rashes, or lesions   MUSCULOSKELETAL: No joint pain or swelling; No muscle, back, or extremity pain  PSYCHIATRIC: No depression, anxiety, mood swings, or difficulty sleeping      Medications:  MEDICATIONS  (STANDING):  albuterol/ipratropium for Nebulization 3 milliLiter(s) Nebulizer every 6 hours  azithromycin  IVPB 500 milliGRAM(s) IV Intermittent every 24 hours  azithromycin  IVPB      cefTRIAXone   IVPB 1000 milliGRAM(s) IV Intermittent every 24 hours  heparin   Injectable 5000 Unit(s) SubCutaneous every 12 hours  pantoprazole    Tablet 40 milliGRAM(s) Oral before breakfast    MEDICATIONS  (PRN):    Vital Signs Last 24 Hrs  T(C): 36.9 (2023 12:29), Max: 37.7 (2023 23:14)  T(F): 98.4 (2023 12:29), Max: 99.9 (2023 23:14)  HR: 96 (2023 12:29) (84 - 100)  BP: 119/73 (2023 12:29) (119/73 - 150/85)  BP(mean): 100 (2023 19:36) (100 - 100)  RR: 18 (2023 12:29) (18 - 23)  SpO2: 96% (2023 12:29) (95% - 98%)    Parameters below as of 2023 12:29  Patient On (Oxygen Delivery Method): nasal cannula  O2 Flow (L/min): 3    VBG pH 7.29  @ 14:44  VBG pCO2 49  @ 14:44  VBG O2 sat 81.1  @ 14:44  VBG lactate 3.7  @ 14:44    LABS:                        12.8   15.42 )-----------( 236      ( 2023 06:23 )             38.5     28    136  |  101  |  17  ----------------------------<  104<H>  3.8   |  22  |  0.61    Ca    9.3      2023 06:20    TPro  7.0  /  Alb  3.4  /  TBili  0.6  /  DBili  x   /  AST  14  /  ALT  25  /  AlkPhos  116      CAPILLARY BLOOD GLUCOSE    PT/INR - ( 2023 14:45 )   PT: 14.3 sec;   INR: 1.23 ratio         PTT - ( 2023 14:45 )  PTT:28.4 sec  Urinalysis Basic - ( 2023 16:17 )    Color: Dark Yellow / Appearance: Slightly Turbid / S.039 / pH: x  Gluc: x / Ketone: Small  / Bili: Small / Urobili: 8 mg/dL   Blood: x / Protein: 100 mg/dL / Nitrite: Negative   Leuk Esterase: Negative / RBC: 7 /hpf / WBC 11 /HPF   Sq Epi: x / Non Sq Epi: 9 /hpf / Bacteria: Negative    CULTURES: (if applicable)    Physical Examination:    General: No acute distress.      HEENT: Pupils equal, reactive to light.  Symmetric.    PULM: rhonchi    CVS: S1, S2    ABD: Soft, nondistended, nontender, normoactive bowel sounds, no masses    EXT: No edema, nontender    SKIN: Warm and well perfused, no rashes noted.    NEURO: Alert, oriented, interactive, nonfocal    RADIOLOGY REVIEWED  CXR:    CT chest: right lung opacities, emphysema    TTE:

## 2023-01-28 NOTE — PATIENT PROFILE ADULT - FALL HARM RISK - RISK INTERVENTIONS
Assistance OOB with selected safe patient handling equipment/Communicate Fall Risk and Risk Factors to all staff, patient, and family/Reinforce activity limits and safety measures with patient and family/Visual Cue: Yellow wristband/Bed in lowest position, wheels locked, appropriate side rails in place/Call bell, personal items and telephone in reach/Instruct patient to call for assistance before getting out of bed or chair/Non-slip footwear when patient is out of bed/[place] to call system/Physically safe environment - no spills, clutter or unnecessary equipment/Purposeful Proactive Rounding/Room/bathroom lighting operational, light cord in reach

## 2023-01-28 NOTE — PHARMACOTHERAPY INTERVENTION NOTE - COMMENTS
Recommended to order a Legionella urinary antigen since patient has been empirically started on azithromycin for the treatment of pneumonia.    Maria C Weber, PharmD, BCIDP  Clinical Pharmacy Specialist, Infectious Diseases  Tele-Antimicrobial Stewardship Program (Tele-ASP)  Tele-ASP Phone: [phone]

## 2023-01-28 NOTE — CONSULT NOTE ADULT - ASSESSMENT
67 with PNA/COPD/former smoker/history of oral cancer    - cont abx  - start LAMA  - wean O2 as tolerated  - CT chest 2-3 months f/u
A/P    68 yo M, hx of laryngeal cancer s/p radiation, presents with shortness of breath for 4 days.     #Dyspnea, PNA, acute viral illness  -cont abx/supportive care per med/pulmonary  -f/u bx  -cta no PE    #chest pain  -atypical, pleuritic, improved  -cta neg for PE  -check echo     dvt ppx      70 minutes spent on total encounter; more than 50% of the visit was spent counseling and/or coordinating care by the attending physician.

## 2023-01-29 LAB
HCT VFR BLD CALC: 36.3 % — LOW (ref 39–50)
HCV AB S/CO SERPL IA: 0.04 S/CO — SIGNIFICANT CHANGE UP (ref 0–0.99)
HCV AB SERPL-IMP: SIGNIFICANT CHANGE UP
HGB BLD-MCNC: 11.9 G/DL — LOW (ref 13–17)
LEGIONELLA AG UR QL: NEGATIVE — SIGNIFICANT CHANGE UP
MCHC RBC-ENTMCNC: 32.5 PG — SIGNIFICANT CHANGE UP (ref 27–34)
MCHC RBC-ENTMCNC: 32.8 GM/DL — SIGNIFICANT CHANGE UP (ref 32–36)
MCV RBC AUTO: 99.2 FL — SIGNIFICANT CHANGE UP (ref 80–100)
NRBC # BLD: 0 /100 WBCS — SIGNIFICANT CHANGE UP (ref 0–0)
PLATELET # BLD AUTO: 257 K/UL — SIGNIFICANT CHANGE UP (ref 150–400)
RBC # BLD: 3.66 M/UL — LOW (ref 4.2–5.8)
RBC # FLD: 12.1 % — SIGNIFICANT CHANGE UP (ref 10.3–14.5)
WBC # BLD: 10.91 K/UL — HIGH (ref 3.8–10.5)
WBC # FLD AUTO: 10.91 K/UL — HIGH (ref 3.8–10.5)

## 2023-01-29 RX ORDER — ACETAMINOPHEN 500 MG
650 TABLET ORAL EVERY 6 HOURS
Refills: 0 | Status: DISCONTINUED | OUTPATIENT
Start: 2023-01-29 | End: 2023-02-01

## 2023-01-29 RX ADMIN — Medication 3 MILLIGRAM(S): at 22:51

## 2023-01-29 RX ADMIN — HEPARIN SODIUM 5000 UNIT(S): 5000 INJECTION INTRAVENOUS; SUBCUTANEOUS at 05:33

## 2023-01-29 RX ADMIN — CEFTRIAXONE 100 MILLIGRAM(S): 500 INJECTION, POWDER, FOR SOLUTION INTRAMUSCULAR; INTRAVENOUS at 15:44

## 2023-01-29 RX ADMIN — HEPARIN SODIUM 5000 UNIT(S): 5000 INJECTION INTRAVENOUS; SUBCUTANEOUS at 18:26

## 2023-01-29 RX ADMIN — Medication 3 MILLILITER(S): at 11:40

## 2023-01-29 RX ADMIN — TIOTROPIUM BROMIDE 2 PUFF(S): 18 CAPSULE ORAL; RESPIRATORY (INHALATION) at 11:40

## 2023-01-29 RX ADMIN — Medication 3 MILLILITER(S): at 05:33

## 2023-01-29 RX ADMIN — Medication 3 MILLIGRAM(S): at 00:04

## 2023-01-29 RX ADMIN — PANTOPRAZOLE SODIUM 40 MILLIGRAM(S): 20 TABLET, DELAYED RELEASE ORAL at 06:34

## 2023-01-29 RX ADMIN — Medication 650 MILLIGRAM(S): at 03:33

## 2023-01-29 RX ADMIN — Medication 3 MILLILITER(S): at 18:26

## 2023-01-29 RX ADMIN — AZITHROMYCIN 255 MILLIGRAM(S): 500 TABLET, FILM COATED ORAL at 22:51

## 2023-01-29 RX ADMIN — Medication 650 MILLIGRAM(S): at 04:03

## 2023-01-29 RX ADMIN — Medication 3 MILLILITER(S): at 23:34

## 2023-01-29 NOTE — PROVIDER CONTACT NOTE (OTHER) - SITUATION
EKG in Pt's chart
Pt C/O of non-radiating L chest pain . As per pt this happen before after I take my breathing treatment

## 2023-01-29 NOTE — PROVIDER CONTACT NOTE (OTHER) - ASSESSMENT
Pt C/O of non-radiating L chest pain . As per pt this happen before after I take my breathing treatment
EKG in Pt's rigoberto

## 2023-01-29 NOTE — PROVIDER CONTACT NOTE (OTHER) - NAME OF MD/NP/PA/DO NOTIFIED:
Dr. Rahman [facility]  [id number]
Dr. Rahman [facility]  [id number]
Yes, Non-Core measure site...

## 2023-01-30 LAB
HCT VFR BLD CALC: 36.9 % — LOW (ref 39–50)
HGB BLD-MCNC: 12.2 G/DL — LOW (ref 13–17)
MCHC RBC-ENTMCNC: 32.7 PG — SIGNIFICANT CHANGE UP (ref 27–34)
MCHC RBC-ENTMCNC: 33.1 GM/DL — SIGNIFICANT CHANGE UP (ref 32–36)
MCV RBC AUTO: 98.9 FL — SIGNIFICANT CHANGE UP (ref 80–100)
NRBC # BLD: 0 /100 WBCS — SIGNIFICANT CHANGE UP (ref 0–0)
PLATELET # BLD AUTO: 291 K/UL — SIGNIFICANT CHANGE UP (ref 150–400)
RBC # BLD: 3.73 M/UL — LOW (ref 4.2–5.8)
RBC # FLD: 12.1 % — SIGNIFICANT CHANGE UP (ref 10.3–14.5)
WBC # BLD: 11.64 K/UL — HIGH (ref 3.8–10.5)
WBC # FLD AUTO: 11.64 K/UL — HIGH (ref 3.8–10.5)

## 2023-01-30 PROCEDURE — 93306 TTE W/DOPPLER COMPLETE: CPT | Mod: 26

## 2023-01-30 RX ADMIN — HEPARIN SODIUM 5000 UNIT(S): 5000 INJECTION INTRAVENOUS; SUBCUTANEOUS at 17:56

## 2023-01-30 RX ADMIN — TIOTROPIUM BROMIDE 2 PUFF(S): 18 CAPSULE ORAL; RESPIRATORY (INHALATION) at 12:20

## 2023-01-30 RX ADMIN — CEFTRIAXONE 100 MILLIGRAM(S): 500 INJECTION, POWDER, FOR SOLUTION INTRAMUSCULAR; INTRAVENOUS at 15:47

## 2023-01-30 RX ADMIN — AZITHROMYCIN 255 MILLIGRAM(S): 500 TABLET, FILM COATED ORAL at 22:02

## 2023-01-30 RX ADMIN — Medication 975 MILLIGRAM(S): at 23:28

## 2023-01-30 RX ADMIN — Medication 3 MILLILITER(S): at 12:20

## 2023-01-30 RX ADMIN — PANTOPRAZOLE SODIUM 40 MILLIGRAM(S): 20 TABLET, DELAYED RELEASE ORAL at 05:31

## 2023-01-30 RX ADMIN — HEPARIN SODIUM 5000 UNIT(S): 5000 INJECTION INTRAVENOUS; SUBCUTANEOUS at 05:31

## 2023-01-30 RX ADMIN — Medication 3 MILLILITER(S): at 17:56

## 2023-01-30 RX ADMIN — Medication 3 MILLILITER(S): at 05:35

## 2023-01-30 RX ADMIN — Medication 3 MILLILITER(S): at 23:27

## 2023-01-30 RX ADMIN — Medication 3 MILLIGRAM(S): at 22:03

## 2023-01-31 ENCOUNTER — TRANSCRIPTION ENCOUNTER (OUTPATIENT)
Age: 68
End: 2023-01-31

## 2023-01-31 LAB
ANION GAP SERPL CALC-SCNC: 10 MMOL/L — SIGNIFICANT CHANGE UP (ref 5–17)
BUN SERPL-MCNC: 13 MG/DL — SIGNIFICANT CHANGE UP (ref 7–23)
CALCIUM SERPL-MCNC: 9.9 MG/DL — SIGNIFICANT CHANGE UP (ref 8.4–10.5)
CHLORIDE SERPL-SCNC: 99 MMOL/L — SIGNIFICANT CHANGE UP (ref 96–108)
CO2 SERPL-SCNC: 28 MMOL/L — SIGNIFICANT CHANGE UP (ref 22–31)
CREAT SERPL-MCNC: 0.6 MG/DL — SIGNIFICANT CHANGE UP (ref 0.5–1.3)
EGFR: 104 ML/MIN/1.73M2 — SIGNIFICANT CHANGE UP
GLUCOSE SERPL-MCNC: 105 MG/DL — HIGH (ref 70–99)
HCT VFR BLD CALC: 38.1 % — LOW (ref 39–50)
HGB BLD-MCNC: 12.5 G/DL — LOW (ref 13–17)
MCHC RBC-ENTMCNC: 32.5 PG — SIGNIFICANT CHANGE UP (ref 27–34)
MCHC RBC-ENTMCNC: 32.8 GM/DL — SIGNIFICANT CHANGE UP (ref 32–36)
MCV RBC AUTO: 99 FL — SIGNIFICANT CHANGE UP (ref 80–100)
NRBC # BLD: 0 /100 WBCS — SIGNIFICANT CHANGE UP (ref 0–0)
PLATELET # BLD AUTO: 328 K/UL — SIGNIFICANT CHANGE UP (ref 150–400)
POTASSIUM SERPL-MCNC: 4 MMOL/L — SIGNIFICANT CHANGE UP (ref 3.5–5.3)
POTASSIUM SERPL-SCNC: 4 MMOL/L — SIGNIFICANT CHANGE UP (ref 3.5–5.3)
RBC # BLD: 3.85 M/UL — LOW (ref 4.2–5.8)
RBC # FLD: 12.3 % — SIGNIFICANT CHANGE UP (ref 10.3–14.5)
SODIUM SERPL-SCNC: 137 MMOL/L — SIGNIFICANT CHANGE UP (ref 135–145)
WBC # BLD: 11.43 K/UL — HIGH (ref 3.8–10.5)
WBC # FLD AUTO: 11.43 K/UL — HIGH (ref 3.8–10.5)

## 2023-01-31 RX ADMIN — Medication 3 MILLILITER(S): at 23:26

## 2023-01-31 RX ADMIN — HEPARIN SODIUM 5000 UNIT(S): 5000 INJECTION INTRAVENOUS; SUBCUTANEOUS at 05:40

## 2023-01-31 RX ADMIN — Medication 3 MILLILITER(S): at 05:38

## 2023-01-31 RX ADMIN — Medication 3 MILLILITER(S): at 17:09

## 2023-01-31 RX ADMIN — PANTOPRAZOLE SODIUM 40 MILLIGRAM(S): 20 TABLET, DELAYED RELEASE ORAL at 05:38

## 2023-01-31 RX ADMIN — HEPARIN SODIUM 5000 UNIT(S): 5000 INJECTION INTRAVENOUS; SUBCUTANEOUS at 17:10

## 2023-01-31 RX ADMIN — TIOTROPIUM BROMIDE 2 PUFF(S): 18 CAPSULE ORAL; RESPIRATORY (INHALATION) at 11:51

## 2023-01-31 RX ADMIN — Medication 3 MILLIGRAM(S): at 21:12

## 2023-01-31 RX ADMIN — CEFTRIAXONE 100 MILLIGRAM(S): 500 INJECTION, POWDER, FOR SOLUTION INTRAMUSCULAR; INTRAVENOUS at 15:33

## 2023-01-31 RX ADMIN — Medication 3 MILLILITER(S): at 11:50

## 2023-01-31 RX ADMIN — AZITHROMYCIN 255 MILLIGRAM(S): 500 TABLET, FILM COATED ORAL at 21:11

## 2023-01-31 NOTE — PHYSICAL THERAPY INITIAL EVALUATION ADULT - PLANNED THERAPY INTERVENTIONS, PT EVAL
GOAL: Stair Negotiation Training: Patient will be able to negotiate up & down 1 flight of stairs with unilateral rail, step through gait pattern, in 2 weeks./gait training/strengthening

## 2023-01-31 NOTE — PHYSICAL THERAPY INITIAL EVALUATION ADULT - ADDITIONAL COMMENTS
Pt lives with his wife in a house with 3 steps then 1 step to enter, no HR and 1 flight of stairs inside, +HR. Pt reports his wife is available to assist when needed. +eyeglasses. +drives.

## 2023-01-31 NOTE — PHYSICAL THERAPY INITIAL EVALUATION ADULT - PERTINENT HX OF CURRENT PROBLEM, REHAB EVAL
Pt is a 67 y.o. male with hx of laryngeal cancer s/p radiation, presents with shortness of breath for 4 days. Reports that beginning Monday, he began to feel unwell and shortness of breath with exertion. Progressively worsening to the point he feels weak and tired with eating and traveling to the bathroom. Shortness of breath at rest but more so with exertion. Also with cough producing "lime-colored" phlegm. Pt found to have multilobar PNA, sepsis and pleuritic pain 2/2 PNA. Chest CTA 1/27/23: No PE. Patchy bilateral opacities predominantly in the right lower lobe concerning for multifocal infection. CXR 1/27/23: Hyperaerated lungs with right lower lung linear atelectasis with no focal consolidation.

## 2023-02-01 ENCOUNTER — TRANSCRIPTION ENCOUNTER (OUTPATIENT)
Age: 68
End: 2023-02-01

## 2023-02-01 VITALS
TEMPERATURE: 98 F | SYSTOLIC BLOOD PRESSURE: 138 MMHG | OXYGEN SATURATION: 92 % | DIASTOLIC BLOOD PRESSURE: 78 MMHG | RESPIRATION RATE: 18 BRPM | HEART RATE: 80 BPM

## 2023-02-01 LAB
CULTURE RESULTS: SIGNIFICANT CHANGE UP
CULTURE RESULTS: SIGNIFICANT CHANGE UP
SPECIMEN SOURCE: SIGNIFICANT CHANGE UP
SPECIMEN SOURCE: SIGNIFICANT CHANGE UP

## 2023-02-01 PROCEDURE — 80048 BASIC METABOLIC PNL TOTAL CA: CPT

## 2023-02-01 PROCEDURE — 82435 ASSAY OF BLOOD CHLORIDE: CPT

## 2023-02-01 PROCEDURE — 36415 COLL VENOUS BLD VENIPUNCTURE: CPT

## 2023-02-01 PROCEDURE — 99285 EMERGENCY DEPT VISIT HI MDM: CPT

## 2023-02-01 PROCEDURE — 80053 COMPREHEN METABOLIC PANEL: CPT

## 2023-02-01 PROCEDURE — 94640 AIRWAY INHALATION TREATMENT: CPT

## 2023-02-01 PROCEDURE — 93005 ELECTROCARDIOGRAM TRACING: CPT

## 2023-02-01 PROCEDURE — 97161 PT EVAL LOW COMPLEX 20 MIN: CPT

## 2023-02-01 PROCEDURE — 71046 X-RAY EXAM CHEST 2 VIEWS: CPT

## 2023-02-01 PROCEDURE — 82947 ASSAY GLUCOSE BLOOD QUANT: CPT

## 2023-02-01 PROCEDURE — 85014 HEMATOCRIT: CPT

## 2023-02-01 PROCEDURE — 85018 HEMOGLOBIN: CPT

## 2023-02-01 PROCEDURE — 84443 ASSAY THYROID STIM HORMONE: CPT

## 2023-02-01 PROCEDURE — 82565 ASSAY OF CREATININE: CPT

## 2023-02-01 PROCEDURE — 87086 URINE CULTURE/COLONY COUNT: CPT

## 2023-02-01 PROCEDURE — 87449 NOS EACH ORGANISM AG IA: CPT

## 2023-02-01 PROCEDURE — 87040 BLOOD CULTURE FOR BACTERIA: CPT

## 2023-02-01 PROCEDURE — 84295 ASSAY OF SERUM SODIUM: CPT

## 2023-02-01 PROCEDURE — 71275 CT ANGIOGRAPHY CHEST: CPT | Mod: MA

## 2023-02-01 PROCEDURE — 84132 ASSAY OF SERUM POTASSIUM: CPT

## 2023-02-01 PROCEDURE — 81001 URINALYSIS AUTO W/SCOPE: CPT

## 2023-02-01 PROCEDURE — 82803 BLOOD GASES ANY COMBINATION: CPT

## 2023-02-01 PROCEDURE — 85025 COMPLETE CBC W/AUTO DIFF WBC: CPT

## 2023-02-01 PROCEDURE — 93306 TTE W/DOPPLER COMPLETE: CPT

## 2023-02-01 PROCEDURE — 86803 HEPATITIS C AB TEST: CPT

## 2023-02-01 PROCEDURE — 82330 ASSAY OF CALCIUM: CPT

## 2023-02-01 PROCEDURE — 85730 THROMBOPLASTIN TIME PARTIAL: CPT

## 2023-02-01 PROCEDURE — 0225U NFCT DS DNA&RNA 21 SARSCOV2: CPT

## 2023-02-01 PROCEDURE — 83605 ASSAY OF LACTIC ACID: CPT

## 2023-02-01 PROCEDURE — 85610 PROTHROMBIN TIME: CPT

## 2023-02-01 PROCEDURE — 85027 COMPLETE CBC AUTOMATED: CPT

## 2023-02-01 RX ORDER — PANTOPRAZOLE SODIUM 20 MG/1
1 TABLET, DELAYED RELEASE ORAL
Qty: 30 | Refills: 0
Start: 2023-02-01 | End: 2023-03-02

## 2023-02-01 RX ORDER — ALBUTEROL 90 UG/1
2 AEROSOL, METERED ORAL
Qty: 1 | Refills: 0
Start: 2023-02-01 | End: 2023-02-14

## 2023-02-01 RX ORDER — TIOTROPIUM BROMIDE 18 UG/1
2 CAPSULE ORAL; RESPIRATORY (INHALATION)
Qty: 1 | Refills: 0
Start: 2023-02-01 | End: 2023-03-02

## 2023-02-01 RX ORDER — CEFUROXIME AXETIL 250 MG
1 TABLET ORAL
Qty: 6 | Refills: 0
Start: 2023-02-01 | End: 2023-02-03

## 2023-02-01 RX ADMIN — Medication 3 MILLILITER(S): at 13:35

## 2023-02-01 RX ADMIN — HEPARIN SODIUM 5000 UNIT(S): 5000 INJECTION INTRAVENOUS; SUBCUTANEOUS at 05:29

## 2023-02-01 RX ADMIN — PANTOPRAZOLE SODIUM 40 MILLIGRAM(S): 20 TABLET, DELAYED RELEASE ORAL at 05:28

## 2023-02-01 RX ADMIN — Medication 3 MILLILITER(S): at 05:29

## 2023-02-01 RX ADMIN — TIOTROPIUM BROMIDE 2 PUFF(S): 18 CAPSULE ORAL; RESPIRATORY (INHALATION) at 13:35

## 2023-02-01 NOTE — DISCHARGE NOTE PROVIDER - NSDCFUADDAPPT_GEN_ALL_CORE_FT
APPTS ARE READY TO BE MADE: [X] YES    Best Family or Patient Contact (if needed):    Additional Information about above appointments (if needed):    1:   2:   3:     Other comments or requests:    APPTS ARE READY TO BE MADE: [X] YES    Best Family or Patient Contact (if needed):    Additional Information about above appointments (if needed):    1:   2:   3:     Other comments or requests:   Patient was provided with follow up request details and was advised to call to schedule follow up within specified time frame.

## 2023-02-01 NOTE — PROGRESS NOTE ADULT - PROVIDER SPECIALTY LIST ADULT
Cardiology
Internal Medicine
Pulmonology
Cardiology
Cardiology
Internal Medicine
Pulmonology
Cardiology
Internal Medicine
Internal Medicine
Pulmonology
Pulmonology

## 2023-02-01 NOTE — PROGRESS NOTE ADULT - REASON FOR ADMISSION
SOB / multi lobar PNA

## 2023-02-01 NOTE — DISCHARGE NOTE PROVIDER - HOSPITAL COURSE
66 yo M, hx of laryngeal cancer s/p radiation, presents with shortness of breath for 4 days.     #Dyspnea, PNA, Acute viral illness  -CTA demonstrated no PE  -Continue IV Zithromax and Rocephin     #Chest Pain  -Atypical, pleuritic, improved  -Echo w/ nml LV fxn  -Trend trop if recurs    #SOB / multilobar PNA / sepsis   -Started on rocephin / zithromax   -Patient currently on 2LNC     #Pleuritic chest pain :  -Likely 2/2 PNA   -Improved     #Hx of laryngeal ca : s/p radiation therapy 4 yrs ago   in remission     Plan for change to Ceftin 500 mg bid x 3 more days and if stable, patient medically cleared for discharge if able to wean off o2 66 yo M, hx of laryngeal cancer s/p radiation, presents with shortness of breath for 4 days. CTA chest noted with patchy bilateral opacities predominantly in the right lower lobe concerning for multifocal infection. Recommend follow-up CT chest in 8-10 weeks for resolution. Patient was treated with IV Zithromax and Rocephin while inpatient. During hospital stay - patient endorsed typical, pleuritic chest pain. TTE with EF 65% w/ normal LV fxn. Upon discharge - plan for change to Ceftin 500 mg bid x 3 more days.    Discharge/Dispo/Med rec discussed with attending  ____. Patient medically cleared for discharge ____ with outpatient follow up with PCP. 66 yo M, hx of laryngeal cancer s/p radiation, presents with shortness of breath for 4 days. CTA chest noted with patchy bilateral opacities predominantly in the right lower lobe concerning for multifocal infection, negative for PE. Recommend follow-up CT chest in 8-10 weeks for resolution. Patient was treated with IV Zithromax and Rocephin while inpatient. During hospital stay - patient endorsed typical, pleuritic chest pain which is improved now.    TTE with EF 65% w/ normal LV fxn. Upon discharge - plan for change to Ceftin 500 mg bid x 3 more days. Patient no longer requires oxygen.     Discharge/Dispo/Med rec discussed with attending Dr. Schilling. Patient is medically cleared and stable for discharge with outpatient follow up with PCP. 68 yo M, hx of laryngeal cancer s/p radiation, presents with shortness of breath for 4 days. CTA chest noted with patchy bilateral opacities predominantly in the right lower lobe concerning for multifocal infection, negative for PE. Recommend follow-up CT chest in 8-10 weeks for resolution. Patient was treated with IV Zithromax and Rocephin while inpatient. During hospital stay - patient endorsed typical, pleuritic chest pain which is improved now.    TTE with EF 65% w/ normal LV fxn. Upon discharge - plan for change to Ceftin 500 mg bid x 3 more days. Patient no longer requires oxygen and did well upon ambulation.      Discharge/Dispo/Med rec discussed with attending Dr. Schilling. Patient is medically cleared and stable for discharge with outpatient follow up with PCP and pulmonologist.

## 2023-02-01 NOTE — DISCHARGE NOTE PROVIDER - CARE PROVIDER_API CALL
Primary Care Provider,   Phone: (   )    -  Fax: (   )    -  Follow Up Time: 1 week   Primary Care Provider,   Phone: (   )    -  Fax: (   )    -  Follow Up Time: 1 week    Yan Melo)  Critical Care Medicine; Internal Medicine; Pulmonary Disease  [id number] [address]  Phone: [phone]  Fax: [phone]  Follow Up Time: 1 week

## 2023-02-01 NOTE — DISCHARGE NOTE NURSING/CASE MANAGEMENT/SOCIAL WORK - NSDCPEFALRISK_GEN_ALL_CORE
For information on Fall & Injury Prevention, visit: [email or web address] OR  [email or web address] OR  https://www.cdc.gov/steadi/patient.html

## 2023-02-01 NOTE — PROGRESS NOTE ADULT - SUBJECTIVE AND OBJECTIVE BOX
CARDIOLOGY FOLLOW UP - Dr. De Jesus  DATE OF SERVICE: 2/1/23    CC  No CV complaints, feels better, No CP    REVIEW OF SYSTEMS:  CONSTITUTIONAL: No fever, weight loss, or fatigue  RESPIRATORY: No cough, wheezing, chills or hemoptysis; No Shortness of Breath  CARDIOVASCULAR: No chest pain, palpitations, passing out, dizziness, or leg swelling  GASTROINTESTINAL: No abdominal or epigastric pain. No nausea, vomiting, or hematemesis; No diarrhea or constipation. No melena or hematochezia.  VASCULAR: No edema     PHYSICAL EXAM:  T(C): 36.8 (02-01-23 @ 04:25), Max: 36.9 (01-31-23 @ 20:00)  HR: 73 (02-01-23 @ 04:25) (73 - 93)  BP: 128/80 (02-01-23 @ 04:25) (128/80 - 147/91)  RR: 18 (02-01-23 @ 04:25) (18 - 19)  SpO2: 92% (02-01-23 @ 10:43) (88% - 95%)  Wt(kg): --  I&O's Summary    31 Jan 2023 07:01  -  01 Feb 2023 07:00  --------------------------------------------------------  IN: 1600 mL / OUT: 1200 mL / NET: 400 mL        Appearance: Normal	  Cardiovascular: Normal S1 S2,RRR, No JVD, No murmurs  Respiratory: Lungs clear to auscultation b/l  Gastrointestinal:  Soft, Non-tender, + BS	  Extremities: Normal range of motion, No clubbing, cyanosis or edema      Home Medications:      MEDICATIONS  (STANDING):  albuterol/ipratropium for Nebulization 3 milliLiter(s) Nebulizer every 6 hours  azithromycin  IVPB 500 milliGRAM(s) IV Intermittent every 24 hours  azithromycin  IVPB      cefTRIAXone   IVPB 1000 milliGRAM(s) IV Intermittent every 24 hours  heparin   Injectable 5000 Unit(s) SubCutaneous every 12 hours  melatonin 3 milliGRAM(s) Oral at bedtime  pantoprazole    Tablet 40 milliGRAM(s) Oral before breakfast  tiotropium 2.5 MICROgram(s) Inhaler 2 Puff(s) Inhalation daily      TELEMETRY: 	    ECG:  	  RADIOLOGY:   DIAGNOSTIC TESTING:  [ ] Echocardiogram:  [ ]  Catheterization:  [ ] Stress Test:    OTHER: 	    LABS:	 	                            12.5   11.43 )-----------( 328      ( 31 Jan 2023 07:01 )             38.1     01-31    137  |  99  |  13  ----------------------------<  105<H>  4.0   |  28  |  0.60    Ca    9.9      31 Jan 2023 07:01              
Date of Service: 01-31-23 @ 14:03    Patient is a 67y old  Male who presents with a chief complaint of SOB / multi lobar PNA (30 Jan 2023 20:07)      Any change in ROS: he seems to be doing  ok : no sob:  nocugh:     MEDICATIONS  (STANDING):  albuterol/ipratropium for Nebulization 3 milliLiter(s) Nebulizer every 6 hours  azithromycin  IVPB 500 milliGRAM(s) IV Intermittent every 24 hours  azithromycin  IVPB      cefTRIAXone   IVPB 1000 milliGRAM(s) IV Intermittent every 24 hours  heparin   Injectable 5000 Unit(s) SubCutaneous every 12 hours  melatonin 3 milliGRAM(s) Oral at bedtime  pantoprazole    Tablet 40 milliGRAM(s) Oral before breakfast  tiotropium 2.5 MICROgram(s) Inhaler 2 Puff(s) Inhalation daily    MEDICATIONS  (PRN):  acetaminophen     Tablet .. 650 milliGRAM(s) Oral every 6 hours PRN Mild Pain (1 - 3), Moderate Pain (4 - 6)    Vital Signs Last 24 Hrs  T(C): 37.5 (31 Jan 2023 04:10), Max: 37.5 (31 Jan 2023 04:10)  T(F): 99.5 (31 Jan 2023 04:10), Max: 99.5 (31 Jan 2023 04:10)  HR: 82 (31 Jan 2023 11:13) (82 - 103)  BP: 152/93 (31 Jan 2023 04:10) (152/93 - 164/71)  BP(mean): --  RR: 18 (31 Jan 2023 04:10) (18 - 19)  SpO2: 94% (31 Jan 2023 11:13) (91% - 95%)    Parameters below as of 31 Jan 2023 11:13  Patient On (Oxygen Delivery Method): nasal cannula  O2 Flow (L/min): 2      I&O's Summary    30 Jan 2023 07:01  -  31 Jan 2023 07:00  --------------------------------------------------------  IN: 1000 mL / OUT: 950 mL / NET: 50 mL    31 Jan 2023 07:01  -  31 Jan 2023 14:03  --------------------------------------------------------  IN: 0 mL / OUT: 250 mL / NET: -250 mL          Physical Exam:   GENERAL: NAD, well-groomed, well-developed  HEENT: LAXMI/   Atraumatic, Normocephalic  ENMT: No tonsillar erythema, exudates, or enlargement; Moist mucous membranes, Good dentition, No lesions  NECK: Supple, No JVD, Normal thyroid  CHEST/LUNG: Clear to auscultaion  CVS: Regular rate and rhythm; No murmurs, rubs, or gallops  GI: : Soft, Nontender, Nondistended; Bowel sounds present  NERVOUS SYSTEM:  Alert & Oriented X3  EXTREMITIES:  2+ Peripheral Pulses, No clubbing, cyanosis, or edema  LYMPH: No lymphadenopathy noted  SKIN: No rashes or lesions  ENDOCRINOLOGY: No Thyromegaly  PSYCH: Appropriate    Labs:  24                            12.5   11.43 )-----------( 328      ( 31 Jan 2023 07:01 )             38.1                         12.2   11.64 )-----------( 291      ( 30 Jan 2023 06:51 )             36.9                         11.9   10.91 )-----------( 257      ( 29 Jan 2023 07:03 )             36.3                         12.8   15.42 )-----------( 236      ( 28 Jan 2023 06:23 )             38.5                         14.3   21.00 )-----------( 250      ( 27 Jan 2023 14:45 )             42.9     01-31    137  |  99  |  13  ----------------------------<  105<H>  4.0   |  28  |  0.60  01-28    136  |  101  |  17  ----------------------------<  104<H>  3.8   |  22  |  0.61  01-27    138  |  100  |  18  ----------------------------<  121<H>  3.8   |  22  |  0.72    Ca    9.9      31 Jan 2023 07:01    TPro  7.0  /  Alb  3.4  /  TBili  0.6  /  DBili  x   /  AST  14  /  ALT  25  /  AlkPhos  116  01-28  TPro  7.5  /  Alb  3.8  /  TBili  0.8  /  DBili  x   /  AST  15  /  ALT  28  /  AlkPhos  125<H>  01-27    CAPILLARY BLOOD GLUCOSE      < from: CT Angio Chest PE Protocol w/ IV Cont (01.27.23 @ 17:54) >    ACC: [id number] EXAM:  CT ANGIO CHEST PULM ART [facility]   ORDERED BY:  ELLI FELICIANO     PROCEDURE DATE:  01/27/2023          INTERPRETATION:  CLINICAL INFORMATION: Tachycardia, hypoxia, 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    COMPARISON: CT chest with IV contrast 2/8/2022    CONTRAST/COMPLICATIONS:  IV Contrast: Omnipaque 350  90 cc administered   10 cc discarded  Oral Contrast: NONE  Complications: None reported at time of study completion    PROCEDURE:  CT Angiography of the Chest.  Sagittal and coronal reformats were performed as well as 3D (MIP)   reconstructions.    FINDINGS:    LUNGS AND AIRWAYS: Patent central airways.  Emphysema. Patchy opacities   in the right upper and bilateral lower lobes with superimposed right   lower lobe consolidation, new since 2/8/2022.  PLEURA: No pleural effusion.  MEDIASTINUM AND ISAIAH: No lymphadenopathy.  VESSELS: No pulmonary embolism. Coronary artery and aortic calcifications.  HEART: Heart size is normal. No pericardial effusion.  CHEST WALL AND LOWER NECK: Within normal limits.  VISUALIZED UPPER ABDOMEN: Cholecystectomy. Previously seen enhancing   hepatic lesion is not visualized, possibly due to recurrences in contrast   timing.  BONES: Degenerative changes. Unchanged sclerotic focus in the manubrium.    IMPRESSION:  No pulmonary embolism.    Patchy bilateral opacities predominantly in the right lower lobe   concerning for multifocal infection. Recommend follow-up CT chest in 8-10   weeks for resolution.    --- End of Report ---           MIHIR GANDARA MD; Resident Radiology  This document has been electronically signed.  HAILEY HODGE MD; Attending Radiologist  This document has been electronically signed. Jan 27 2023  7:10PM    < end of copied text >            Lactate, Blood: 1.2 mmol/L (01-27 @ 18:20)        RECENT CULTURES:  01-27 @ 16:17 Clean Catch Clean Catch (Midstream)                No growth    01-27 @ 13:35 .Blood Blood-Peripheral                No growth to date.    01-27 @ 13:00 .Blood Blood-Peripheral                No growth to date.          RESPIRATORY CULTURES:          Studies  Chest X-RAY  CT SCAN Chest   Venous Dopplers: LE:   CT Abdomen  Others              
Date of Service: 02-01-23 @ 16:54    Patient is a 67y old  Male who presents with a chief complaint of SOB / multi lobar PNA (01 Feb 2023 12:07)      Any change in ROS: seems OK:  no sob:  no cough : on rooma ir:     MEDICATIONS  (STANDING):  albuterol/ipratropium for Nebulization 3 milliLiter(s) Nebulizer every 6 hours  azithromycin  IVPB 500 milliGRAM(s) IV Intermittent every 24 hours  azithromycin  IVPB      cefTRIAXone   IVPB 1000 milliGRAM(s) IV Intermittent every 24 hours  heparin   Injectable 5000 Unit(s) SubCutaneous every 12 hours  melatonin 3 milliGRAM(s) Oral at bedtime  pantoprazole    Tablet 40 milliGRAM(s) Oral before breakfast  tiotropium 2.5 MICROgram(s) Inhaler 2 Puff(s) Inhalation daily    MEDICATIONS  (PRN):  acetaminophen     Tablet .. 650 milliGRAM(s) Oral every 6 hours PRN Mild Pain (1 - 3), Moderate Pain (4 - 6)    Vital Signs Last 24 Hrs  T(C): 36.8 (01 Feb 2023 04:25), Max: 36.9 (31 Jan 2023 20:00)  T(F): 98.3 (01 Feb 2023 04:25), Max: 98.5 (31 Jan 2023 20:00)  HR: 73 (01 Feb 2023 04:25) (73 - 93)  BP: 128/80 (01 Feb 2023 04:25) (128/80 - 147/91)  BP(mean): --  RR: 18 (01 Feb 2023 04:25) (18 - 19)  SpO2: 92% (01 Feb 2023 10:43) (88% - 95%)    Parameters below as of 01 Feb 2023 10:43  Patient On (Oxygen Delivery Method): room air        I&O's Summary    31 Jan 2023 07:01  -  01 Feb 2023 07:00  --------------------------------------------------------  IN: 1600 mL / OUT: 1200 mL / NET: 400 mL          Physical Exam:   GENERAL: NAD, well-groomed, well-developed  HEENT: LAXMI/   Atraumatic, Normocephalic  ENMT: No tonsillar erythema, exudates, or enlargement; Moist mucous membranes, Good dentition, No lesions  NECK: Supple, No JVD, Normal thyroid  CHEST/LUNG: Clear to auscultaion,  GI: : Soft, Nontender, Nondistended; Bowel sounds present  NERVOUS SYSTEM:  Alert & Oriented X3  EXTREMITIES:  2+ Peripheral Pulses, No clubbing, cyanosis, or edema  LYMPH: No lymphadenopathy noted  SKIN: No rashes or lesions  ENDOCRINOLOGY: No Thyromegaly  PSYCH: Appropriate    Labs:  24                            12.5   11.43 )-----------( 328      ( 31 Jan 2023 07:01 )             38.1                         12.2   11.64 )-----------( 291      ( 30 Jan 2023 06:51 )             36.9                         11.9   10.91 )-----------( 257      ( 29 Jan 2023 07:03 )             36.3     01-31    137  |  99  |  13  ----------------------------<  105<H>  4.0   |  28  |  0.60    Ca    9.9      31 Jan 2023 07:01      CAPILLARY BLOOD GLUCOSE      rad< from: CT Angio Chest PE Protocol w/ IV Cont (01.27.23 @ 17:54) >  CONTRAST/COMPLICATIONS:  IV Contrast: Omnipaque 350  90 cc administered   10 cc discarded  Oral Contrast: NONE  Complications: None reported at time of study completion    PROCEDURE:  CT Angiography of the Chest.  Sagittal and coronal reformats were performed as well as 3D (MIP)   reconstructions.    FINDINGS:    LUNGS AND AIRWAYS: Patent central airways.  Emphysema. Patchy opacities   in the right upper and bilateral lower lobes with superimposed right   lower lobe consolidation, new since 2/8/2022.  PLEURA: No pleural effusion.  MEDIASTINUM AND ISAIAH: No lymphadenopathy.  VESSELS: No pulmonary embolism. Coronary artery and aortic calcifications.  HEART: Heart size is normal. No pericardial effusion.  CHEST WALL AND LOWER NECK: Within normal limits.  VISUALIZED UPPER ABDOMEN: Cholecystectomy. Previously seen enhancing   hepatic lesion is not visualized, possibly due to recurrences in contrast   timing.  BONES: Degenerative changes. Unchanged sclerotic focus in the manubrium.    IMPRESSION:  No pulmonary embolism.    Patchy bilateral opacities predominantly in the right lower lobe   concerning for multifocal infection. Recommend follow-up CT chest in 8-10   weeks for resolution.    --- End of Report ---           MIHIR GANDARA MD; Resident Radiology  This document has been electronically signed.  HAILEY HODGE MD; Attending Radiologist  This document has been electronically signed. Jan 27 2023  7:10PM    < end of copied text >                  RECENT CULTURES:  01-27 @ 16:17 Clean Catch Clean Catch (Midstream)                No growth    01-27 @ 13:35 .Blood Blood-Peripheral                No growth to date.    01-27 @ 13:00 .Blood Blood-Peripheral                No growth to date.          RESPIRATORY CULTURES:          Studies  Chest X-RAY  CT SCAN Chest   Venous Dopplers: LE:   CT Abdomen  Others              
Follow-up Pulm Progress Note    Albuterol causes "burning" at times. He is willing to continue.     Medications:  MEDICATIONS  (STANDING):  albuterol/ipratropium for Nebulization 3 milliLiter(s) Nebulizer every 6 hours  azithromycin  IVPB 500 milliGRAM(s) IV Intermittent every 24 hours  azithromycin  IVPB      cefTRIAXone   IVPB 1000 milliGRAM(s) IV Intermittent every 24 hours  heparin   Injectable 5000 Unit(s) SubCutaneous every 12 hours  melatonin 3 milliGRAM(s) Oral at bedtime  pantoprazole    Tablet 40 milliGRAM(s) Oral before breakfast  tiotropium 2.5 MICROgram(s) Inhaler 2 Puff(s) Inhalation daily    MEDICATIONS  (PRN):  acetaminophen     Tablet .. 650 milliGRAM(s) Oral every 6 hours PRN Mild Pain (1 - 3), Moderate Pain (4 - 6)    Vital Signs Last 24 Hrs  T(C): 36.9 (2023 12:41), Max: 37.2 (2023 20:47)  T(F): 98.5 (2023 12:41), Max: 99 (2023 20:47)  HR: 106 (2023 12:41) (82 - 106)  BP: 159/84 (2023 12:41) (121/85 - 159/84)  BP(mean): --  RR: 18 (2023 12:41) (18 - 18)  SpO2: 94% (2023 12:41) (94% - 97%)    Parameters below as of 2023 04:09  Patient On (Oxygen Delivery Method): nasal cannula  O2 Flow (L/min): 3        VBG pH 7.29  @ 14:44    VBG pCO2 49  @ 14:44    VBG O2 sat 81.1  @ 14:44    VBG lactate 3.7  @ 14:44       @ 07:01  -   @ 07:00  --------------------------------------------------------  IN: 360 mL / OUT: 300 mL / NET: 60 mL    LABS:                        11.9   10.91 )-----------( 257      ( 2023 07:03 )             36.3         136  |  101  |  17  ----------------------------<  104<H>  3.8   |  22  |  0.61    Ca    9.3      2023 06:20    TPro  7.0  /  Alb  3.4  /  TBili  0.6  /  DBili  x   /  AST  14  /  ALT  25  /  AlkPhos  116      CAPILLARY BLOOD GLUCOSE        PT/INR - ( 2023 14:45 )   PT: 14.3 sec;   INR: 1.23 ratio         PTT - ( 2023 14:45 )  PTT:28.4 sec  Urinalysis Basic - ( 2023 16:17 )    Color: Dark Yellow / Appearance: Slightly Turbid / S.039 / pH: x  Gluc: x / Ketone: Small  / Bili: Small / Urobili: 8 mg/dL   Blood: x / Protein: 100 mg/dL / Nitrite: Negative   Leuk Esterase: Negative / RBC: 7 /hpf / WBC 11 /HPF   Sq Epi: x / Non Sq Epi: 9 /hpf / Bacteria: Negative    CULTURES: (if applicable)    Culture - Blood (collected 23 @ 13:35)  Source: .Blood Blood-Peripheral  Preliminary Report (23 @ 18:02):    No growth to date.    Culture - Blood (collected 23 @ 13:00)  Source: .Blood Blood-Peripheral  Preliminary Report (23 @ 18:02):    No growth to date.        Culture - Urine (collected 23 @ 16:17)  Source: Clean Catch Clean Catch (Midstream)  Final Report (23 @ 15:17):    No growth    Physical Examination:  PULM: Decreased BS at bases  CVS: S1, S2 heard    RADIOLOGY REVIEWED  CXR:     CT chest:    TTE:  
Patient is a 67y old  Male who presents with a chief complaint of SOB / multi lobar PNA (29 Jan 2023 13:54)      INTERVAL HPI/OVERNIGHT EVENTS: seen and examined , feels lot better   T(C): 37.3 (01-29-23 @ 20:46), Max: 37.3 (01-29-23 @ 20:46)  HR: 100 (01-29-23 @ 20:46) (82 - 106)  BP: 144/90 (01-29-23 @ 20:46) (121/85 - 159/84)  RR: 18 (01-29-23 @ 20:46) (18 - 18)  SpO2: 95% (01-29-23 @ 20:46) (94% - 97%)  Wt(kg): --  I&O's Summary    28 Jan 2023 07:01  -  29 Jan 2023 07:00  --------------------------------------------------------  IN: 360 mL / OUT: 300 mL / NET: 60 mL    29 Jan 2023 07:01  -  30 Jan 2023 01:28  --------------------------------------------------------  IN: 0 mL / OUT: 350 mL / NET: -350 mL        PAST MEDICAL & SURGICAL HISTORY:  Laryngeal cancer      Sleep apnea  non-compliant      S/P cholecystectomy  laparoscopic 2008          SOCIAL HISTORY  Alcohol:  Tobacco:  Illicit substance use:    FAMILY HISTORY:    REVIEW OF SYSTEMS:  CONSTITUTIONAL: No fever, weight loss, or fatigue  EYES: No eye pain, visual disturbances, or discharge  ENMT:  No difficulty hearing, tinnitus, vertigo; No sinus or throat pain  NECK: No pain or stiffness  RESPIRATORY: No cough, wheezing, chills or hemoptysis; No shortness of breath  CARDIOVASCULAR: No chest pain, palpitations, dizziness, or leg swelling  GASTROINTESTINAL: No abdominal or epigastric pain. No nausea, vomiting, or hematemesis; No diarrhea or constipation. No melena or hematochezia.  GENITOURINARY: No dysuria, frequency, hematuria, or incontinence  NEUROLOGICAL: No headaches, memory loss, loss of strength, numbness, or tremors  SKIN: No itching, burning, rashes, or lesions   LYMPH NODES: No enlarged glands  ENDOCRINE: No heat or cold intolerance; No hair loss  MUSCULOSKELETAL: No joint pain or swelling; No muscle, back, or extremity pain  PSYCHIATRIC: No depression, anxiety, mood swings, or difficulty sleeping  HEME/LYMPH: No easy bruising, or bleeding gums  ALLERY AND IMMUNOLOGIC: No hives or eczema    RADIOLOGY & ADDITIONAL TESTS:    Imaging Personally Reviewed:  [ ] YES  [ ] NO    Consultant(s) Notes Reviewed:  [ ] YES  [ ] NO    PHYSICAL EXAM:  GENERAL: NAD, well-groomed, well-developed  HEAD:  Atraumatic, Normocephalic  EYES: EOMI, PERRLA, conjunctiva and sclera clear  ENMT: No tonsillar erythema, exudates, or enlargement; Moist mucous membranes, Good dentition, No lesions  NECK: Supple, No JVD, Normal thyroid  NERVOUS SYSTEM:  Alert & Oriented X3, Good concentration; Motor Strength 5/5 B/L upper and lower extremities; DTRs 2+ intact and symmetric  CHEST/LUNG: Clear to percussion bilaterally; No rales, rhonchi, wheezing, or rubs  HEART: Regular rate and rhythm; No murmurs, rubs, or gallops  ABDOMEN: Soft, Nontender, Nondistended; Bowel sounds present  EXTREMITIES:  2+ Peripheral Pulses, No clubbing, cyanosis, or edema  LYMPH: No lymphadenopathy noted  SKIN: No rashes or lesions    LABS:                        11.9   10.91 )-----------( 257      ( 29 Jan 2023 07:03 )             36.3     01-28    136  |  101  |  17  ----------------------------<  104<H>  3.8   |  22  |  0.61    Ca    9.3      28 Jan 2023 06:20    TPro  7.0  /  Alb  3.4  /  TBili  0.6  /  DBili  x   /  AST  14  /  ALT  25  /  AlkPhos  116  01-28        CAPILLARY BLOOD GLUCOSE                MEDICATIONS  (STANDING):  albuterol/ipratropium for Nebulization 3 milliLiter(s) Nebulizer every 6 hours  azithromycin  IVPB 500 milliGRAM(s) IV Intermittent every 24 hours  azithromycin  IVPB      cefTRIAXone   IVPB 1000 milliGRAM(s) IV Intermittent every 24 hours  heparin   Injectable 5000 Unit(s) SubCutaneous every 12 hours  melatonin 3 milliGRAM(s) Oral at bedtime  pantoprazole    Tablet 40 milliGRAM(s) Oral before breakfast  tiotropium 2.5 MICROgram(s) Inhaler 2 Puff(s) Inhalation daily    MEDICATIONS  (PRN):  acetaminophen     Tablet .. 650 milliGRAM(s) Oral every 6 hours PRN Mild Pain (1 - 3), Moderate Pain (4 - 6)      Care Discussed with Consultants/Other Providers [ ] YES  [ ] NO
Patient is a 67y old  Male who presents with a chief complaint of SOB / multi lobar PNA (30 Jan 2023 12:46)      INTERVAL HPI/OVERNIGHT EVENTS: seen and examined, feeling much better , afebrile   T(C): 36.7 (01-30-23 @ 20:51), Max: 37.1 (01-30-23 @ 11:20)  HR: 103 (01-30-23 @ 20:51) (77 - 103)  BP: 164/71 (01-30-23 @ 20:51) (146/89 - 164/71)  RR: 19 (01-30-23 @ 20:51) (18 - 19)  SpO2: 91% (01-30-23 @ 20:51) (91% - 94%)  Wt(kg): --  I&O's Summary    29 Jan 2023 07:01  -  30 Jan 2023 07:00  --------------------------------------------------------  IN: 0 mL / OUT: 350 mL / NET: -350 mL    30 Jan 2023 07:01  -  31 Jan 2023 03:08  --------------------------------------------------------  IN: 1000 mL / OUT: 350 mL / NET: 650 mL        PAST MEDICAL & SURGICAL HISTORY:  Laryngeal cancer      Sleep apnea  non-compliant      S/P cholecystectomy  laparoscopic 2008          SOCIAL HISTORY  Alcohol:  Tobacco:  Illicit substance use:    FAMILY HISTORY:    REVIEW OF SYSTEMS:  CONSTITUTIONAL: No fever, weight loss, or fatigue  EYES: No eye pain, visual disturbances, or discharge  ENMT:  No difficulty hearing, tinnitus, vertigo; No sinus or throat pain  NECK: No pain or stiffness  RESPIRATORY: No cough, wheezing, chills or hemoptysis; No shortness of breath  CARDIOVASCULAR: No chest pain, palpitations, dizziness, or leg swelling  GASTROINTESTINAL: No abdominal or epigastric pain. No nausea, vomiting, or hematemesis; No diarrhea or constipation. No melena or hematochezia.  GENITOURINARY: No dysuria, frequency, hematuria, or incontinence  NEUROLOGICAL: No headaches, memory loss, loss of strength, numbness, or tremors  SKIN: No itching, burning, rashes, or lesions   LYMPH NODES: No enlarged glands  ENDOCRINE: No heat or cold intolerance; No hair loss  MUSCULOSKELETAL: No joint pain or swelling; No muscle, back, or extremity pain  PSYCHIATRIC: No depression, anxiety, mood swings, or difficulty sleeping  HEME/LYMPH: No easy bruising, or bleeding gums  ALLERY AND IMMUNOLOGIC: No hives or eczema    RADIOLOGY & ADDITIONAL TESTS:    Imaging Personally Reviewed:  [ ] YES  [ ] NO    Consultant(s) Notes Reviewed:  [ ] YES  [ ] NO    PHYSICAL EXAM:  GENERAL: NAD, well-groomed, well-developed  HEAD:  Atraumatic, Normocephalic  EYES: EOMI, PERRLA, conjunctiva and sclera clear  ENMT: No tonsillar erythema, exudates, or enlargement; Moist mucous membranes, Good dentition, No lesions  NECK: Supple, No JVD, Normal thyroid  NERVOUS SYSTEM:  Alert & Oriented X3, Good concentration; Motor Strength 5/5 B/L upper and lower extremities; DTRs 2+ intact and symmetric  CHEST/LUNG: Clear to percussion bilaterally; No rales, rhonchi, wheezing, or rubs  HEART: Regular rate and rhythm; No murmurs, rubs, or gallops  ABDOMEN: Soft, Nontender, Nondistended; Bowel sounds present  EXTREMITIES:  2+ Peripheral Pulses, No clubbing, cyanosis, or edema  LYMPH: No lymphadenopathy noted  SKIN: No rashes or lesions    LABS:                        12.2   11.64 )-----------( 291      ( 30 Jan 2023 06:51 )             36.9               CAPILLARY BLOOD GLUCOSE                MEDICATIONS  (STANDING):  albuterol/ipratropium for Nebulization 3 milliLiter(s) Nebulizer every 6 hours  azithromycin  IVPB 500 milliGRAM(s) IV Intermittent every 24 hours  azithromycin  IVPB      cefTRIAXone   IVPB 1000 milliGRAM(s) IV Intermittent every 24 hours  heparin   Injectable 5000 Unit(s) SubCutaneous every 12 hours  melatonin 3 milliGRAM(s) Oral at bedtime  pantoprazole    Tablet 40 milliGRAM(s) Oral before breakfast  tiotropium 2.5 MICROgram(s) Inhaler 2 Puff(s) Inhalation daily    MEDICATIONS  (PRN):  acetaminophen     Tablet .. 650 milliGRAM(s) Oral every 6 hours PRN Mild Pain (1 - 3), Moderate Pain (4 - 6)      Care Discussed with Consultants/Other Providers [ ] YES  [ ] NO
CARDIOLOGY FOLLOW UP - Dr. De Jesus  DATE OF SERVICE: 1/30/23    CC  No CV events    REVIEW OF SYSTEMS:  CONSTITUTIONAL: No fever, weight loss, or fatigue  RESPIRATORY: No cough, wheezing, chills or hemoptysis; No Shortness of Breath  CARDIOVASCULAR: No chest pain, palpitations, passing out, dizziness, or leg swelling  GASTROINTESTINAL: No abdominal or epigastric pain. No nausea, vomiting, or hematemesis; No diarrhea or constipation. No melena or hematochezia.  VASCULAR: No edema     PHYSICAL EXAM:  T(C): 36.6 (01-30-23 @ 04:10), Max: 37.3 (01-29-23 @ 20:46)  HR: 77 (01-30-23 @ 04:10) (77 - 106)  BP: 146/89 (01-30-23 @ 04:10) (144/90 - 159/84)  RR: 18 (01-30-23 @ 04:10) (18 - 18)  SpO2: 93% (01-30-23 @ 04:10) (93% - 95%)  Wt(kg): --  I&O's Summary    29 Jan 2023 07:01  -  30 Jan 2023 07:00  --------------------------------------------------------  IN: 0 mL / OUT: 350 mL / NET: -350 mL        Appearance: Normal	  Cardiovascular: Normal S1 S2,RRR, No JVD, No murmurs  Respiratory: Lungs clear to auscultation	  Gastrointestinal:  Soft, Non-tender, + BS	  Extremities: Normal range of motion, No clubbing, cyanosis or edema      Home Medications:      MEDICATIONS  (STANDING):  albuterol/ipratropium for Nebulization 3 milliLiter(s) Nebulizer every 6 hours  azithromycin  IVPB 500 milliGRAM(s) IV Intermittent every 24 hours  azithromycin  IVPB      cefTRIAXone   IVPB 1000 milliGRAM(s) IV Intermittent every 24 hours  heparin   Injectable 5000 Unit(s) SubCutaneous every 12 hours  melatonin 3 milliGRAM(s) Oral at bedtime  pantoprazole    Tablet 40 milliGRAM(s) Oral before breakfast  tiotropium 2.5 MICROgram(s) Inhaler 2 Puff(s) Inhalation daily      TELEMETRY: 	    ECG:  	  RADIOLOGY:   DIAGNOSTIC TESTING:  [ ] Echocardiogram:  [ ]  Catheterization:  [ ] Stress Test:    OTHER: 	    LABS:	 	                            12.2   11.64 )-----------( 291      ( 30 Jan 2023 06:51 )             36.9                   
Patient is a 67y old  Male who presents with a chief complaint of SOB / Multi-Lobar PNA (01 Feb 2023 00:24)      INTERVAL HPI/OVERNIGHT EVENTS: doing well , denies any SOB , on 2 L n/c , will check oxygen sat on RA  T(C): 36.5 (02-01-23 @ 01:05), Max: 37.5 (01-31-23 @ 04:10)  HR: 74 (02-01-23 @ 01:05) (74 - 93)  BP: 147/91 (02-01-23 @ 01:05) (138/90 - 152/93)  RR: 18 (02-01-23 @ 01:05) (18 - 19)  SpO2: 95% (02-01-23 @ 01:05) (88% - 95%)  Wt(kg): --  I&O's Summary    30 Jan 2023 07:01  -  31 Jan 2023 07:00  --------------------------------------------------------  IN: 1000 mL / OUT: 950 mL / NET: 50 mL    31 Jan 2023 07:01  -  01 Feb 2023 02:51  --------------------------------------------------------  IN: 1600 mL / OUT: 800 mL / NET: 800 mL        PAST MEDICAL & SURGICAL HISTORY:  Laryngeal cancer      Sleep apnea  non-compliant      S/P cholecystectomy  laparoscopic 2008          SOCIAL HISTORY  Alcohol:  Tobacco:  Illicit substance use:    FAMILY HISTORY:    REVIEW OF SYSTEMS:  CONSTITUTIONAL: No fever, weight loss, or fatigue  EYES: No eye pain, visual disturbances, or discharge  ENMT:  No difficulty hearing, tinnitus, vertigo; No sinus or throat pain  NECK: No pain or stiffness  RESPIRATORY: No cough, wheezing, chills or hemoptysis; No shortness of breath  CARDIOVASCULAR: No chest pain, palpitations, dizziness, or leg swelling  GASTROINTESTINAL: No abdominal or epigastric pain. No nausea, vomiting, or hematemesis; No diarrhea or constipation. No melena or hematochezia.  GENITOURINARY: No dysuria, frequency, hematuria, or incontinence  NEUROLOGICAL: No headaches, memory loss, loss of strength, numbness, or tremors  SKIN: No itching, burning, rashes, or lesions   LYMPH NODES: No enlarged glands  ENDOCRINE: No heat or cold intolerance; No hair loss  MUSCULOSKELETAL: No joint pain or swelling; No muscle, back, or extremity pain  PSYCHIATRIC: No depression, anxiety, mood swings, or difficulty sleeping  HEME/LYMPH: No easy bruising, or bleeding gums  ALLERY AND IMMUNOLOGIC: No hives or eczema    RADIOLOGY & ADDITIONAL TESTS:    Imaging Personally Reviewed:  [ ] YES  [ ] NO    Consultant(s) Notes Reviewed:  [ ] YES  [ ] NO    PHYSICAL EXAM:  GENERAL: NAD, well-groomed, well-developed  HEAD:  Atraumatic, Normocephalic  EYES: EOMI, PERRLA, conjunctiva and sclera clear  ENMT: No tonsillar erythema, exudates, or enlargement; Moist mucous membranes, Good dentition, No lesions  NECK: Supple, No JVD, Normal thyroid  NERVOUS SYSTEM:  Alert & Oriented X3, Good concentration; Motor Strength 5/5 B/L upper and lower extremities; DTRs 2+ intact and symmetric  CHEST/LUNG: Clear to percussion bilaterally; No rales, rhonchi, wheezing, or rubs  HEART: Regular rate and rhythm; No murmurs, rubs, or gallops  ABDOMEN: Soft, Nontender, Nondistended; Bowel sounds present  EXTREMITIES:  2+ Peripheral Pulses, No clubbing, cyanosis, or edema  LYMPH: No lymphadenopathy noted  SKIN: No rashes or lesions    LABS:                        12.5   11.43 )-----------( 328      ( 31 Jan 2023 07:01 )             38.1     01-31    137  |  99  |  13  ----------------------------<  105<H>  4.0   |  28  |  0.60    Ca    9.9      31 Jan 2023 07:01          CAPILLARY BLOOD GLUCOSE                MEDICATIONS  (STANDING):  albuterol/ipratropium for Nebulization 3 milliLiter(s) Nebulizer every 6 hours  azithromycin  IVPB 500 milliGRAM(s) IV Intermittent every 24 hours  azithromycin  IVPB      cefTRIAXone   IVPB 1000 milliGRAM(s) IV Intermittent every 24 hours  heparin   Injectable 5000 Unit(s) SubCutaneous every 12 hours  melatonin 3 milliGRAM(s) Oral at bedtime  pantoprazole    Tablet 40 milliGRAM(s) Oral before breakfast  tiotropium 2.5 MICROgram(s) Inhaler 2 Puff(s) Inhalation daily    MEDICATIONS  (PRN):  acetaminophen     Tablet .. 650 milliGRAM(s) Oral every 6 hours PRN Mild Pain (1 - 3), Moderate Pain (4 - 6)      Care Discussed with Consultants/Other Providers [ ] YES  [ ] NO
CARDIOLOGY FOLLOW UP - Dr. De Jesus  DATE OF SERVICE: 1/31/23    CC  No CV complaints    REVIEW OF SYSTEMS:  CONSTITUTIONAL: No fever, weight loss, or fatigue  RESPIRATORY: No cough, wheezing, chills or hemoptysis; No Shortness of Breath  CARDIOVASCULAR: No chest pain, palpitations, passing out, dizziness, or leg swelling  GASTROINTESTINAL: No abdominal or epigastric pain. No nausea, vomiting, or hematemesis; No diarrhea or constipation. No melena or hematochezia.  VASCULAR: No edema     PHYSICAL EXAM:  T(C): 36.8 (01-31-23 @ 14:30), Max: 37.5 (01-31-23 @ 04:10)  HR: 79 (01-31-23 @ 14:30) (79 - 103)  BP: 143/84 (01-31-23 @ 14:30) (143/84 - 164/71)  RR: 19 (01-31-23 @ 14:30) (18 - 19)  SpO2: 94% (01-31-23 @ 11:13) (91% - 95%)  Wt(kg): --  I&O's Summary    30 Jan 2023 07:01  -  31 Jan 2023 07:00  --------------------------------------------------------  IN: 1000 mL / OUT: 950 mL / NET: 50 mL    31 Jan 2023 07:01  -  31 Jan 2023 14:50  --------------------------------------------------------  IN: 0 mL / OUT: 250 mL / NET: -250 mL        Appearance: Normal	  Cardiovascular: Normal S1 S2,RRR, No JVD, No murmurs  Respiratory: Lungs clear to auscultation	  Gastrointestinal:  Soft, Non-tender, + BS	  Extremities: Normal range of motion, No clubbing, cyanosis or edema      Home Medications:      MEDICATIONS  (STANDING):  albuterol/ipratropium for Nebulization 3 milliLiter(s) Nebulizer every 6 hours  azithromycin  IVPB 500 milliGRAM(s) IV Intermittent every 24 hours  azithromycin  IVPB      cefTRIAXone   IVPB 1000 milliGRAM(s) IV Intermittent every 24 hours  heparin   Injectable 5000 Unit(s) SubCutaneous every 12 hours  melatonin 3 milliGRAM(s) Oral at bedtime  pantoprazole    Tablet 40 milliGRAM(s) Oral before breakfast  tiotropium 2.5 MICROgram(s) Inhaler 2 Puff(s) Inhalation daily      TELEMETRY: 	    ECG:  	  RADIOLOGY:   DIAGNOSTIC TESTING:  [ ] Echocardiogram:  < from: Transthoracic Echocardiogram (01.30.23 @ 12:22) >  Conclusions:  1. Normal left ventricular internal dimensions and wall  thicknesses.  2. Normal left ventricular systolic function. No segmental  wall motion abnormalities.  3. The right ventricle is not well visualized; grossly  normal right ventricular systolic function.    < end of copied text >    [ ]  Catheterization:  [ ] Stress Test:    OTHER: 	    LABS:	 	                            12.5   11.43 )-----------( 328      ( 31 Jan 2023 07:01 )             38.1     01-31    137  |  99  |  13  ----------------------------<  105<H>  4.0   |  28  |  0.60    Ca    9.9      31 Jan 2023 07:01              
CARDIOLOGY FOLLOW UP NOTE - DR. PIERCE    Patient Name: ACE DAMIAN  Date of Service: 01-29-23 @ 13:28    Patient seen and examined  + episode of chest pain    Subjective:    cv: denies chest pain, dyspnea, palpitations, dizziness  pulmonary: denies cough  GI: denies abdominal pain, nausea, vomiting  vascular/legs: no edema   skin: no rash  ROS: otherwise negative   overnight events:      PHYSICAL EXAM:  T(C): 36.9 (01-29-23 @ 12:41), Max: 37.2 (01-28-23 @ 20:47)  HR: 106 (01-29-23 @ 12:41) (82 - 106)  BP: 159/84 (01-29-23 @ 12:41) (121/85 - 159/84)  RR: 18 (01-29-23 @ 12:41) (18 - 18)  SpO2: 94% (01-29-23 @ 12:41) (94% - 97%)  Wt(kg): --  I&O's Summary    28 Jan 2023 07:01  -  29 Jan 2023 07:00  --------------------------------------------------------  IN: 360 mL / OUT: 300 mL / NET: 60 mL      Daily     Daily     Appearance: Normal	  Cardiovascular: Normal S1 S2,RRR, No JVD, No murmurs  Respiratory: Lungs clear to auscultation	  Gastrointestinal:  Soft, Non-tender, + BS	  Extremities: Normal range of motion, No clubbing, cyanosis or edema      Home Medications:      MEDICATIONS  (STANDING):  albuterol/ipratropium for Nebulization 3 milliLiter(s) Nebulizer every 6 hours  azithromycin  IVPB      azithromycin  IVPB 500 milliGRAM(s) IV Intermittent every 24 hours  cefTRIAXone   IVPB 1000 milliGRAM(s) IV Intermittent every 24 hours  heparin   Injectable 5000 Unit(s) SubCutaneous every 12 hours  melatonin 3 milliGRAM(s) Oral at bedtime  pantoprazole    Tablet 40 milliGRAM(s) Oral before breakfast  tiotropium 2.5 MICROgram(s) Inhaler 2 Puff(s) Inhalation daily      TELEMETRY: 	    ECG:  	  RADIOLOGY:   DIAGNOSTIC TESTING:  [ ] Echocardiogram:  [ ] Catheterization:  [ ] Stress Test:    OTHER: 	    LABS:	 	    CARDIAC MARKERS:                                      11.9   10.91 )-----------( 257      ( 29 Jan 2023 07:03 )             36.3     01-28    136  |  101  |  17  ----------------------------<  104<H>  3.8   |  22  |  0.61    Ca    9.3      28 Jan 2023 06:20    TPro  7.0  /  Alb  3.4  /  TBili  0.6  /  DBili  x   /  AST  14  /  ALT  25  /  AlkPhos  116  01-28    proBNP:   PT/INR - ( 27 Jan 2023 14:45 )   PT: 14.3 sec;   INR: 1.23 ratio         PTT - ( 27 Jan 2023 14:45 )  PTT:28.4 sec  Lipid Profile:   HgA1c:     Creatinine, Serum: 0.61 mg/dL (01-28-23 @ 06:20)  Creatinine, Serum: 0.72 mg/dL (01-27-23 @ 14:45)            
Date of Service: 01-30-23 @ 12:46    Patient is a 67y old  Male who presents with a chief complaint of SOB / multi lobar PNA (30 Jan 2023 11:57)      Any change in ROS: he feels very weak  : no sob at rest:  on 2 L of oxygen : could not sleep last night       MEDICATIONS  (STANDING):  albuterol/ipratropium for Nebulization 3 milliLiter(s) Nebulizer every 6 hours  azithromycin  IVPB 500 milliGRAM(s) IV Intermittent every 24 hours  azithromycin  IVPB      cefTRIAXone   IVPB 1000 milliGRAM(s) IV Intermittent every 24 hours  heparin   Injectable 5000 Unit(s) SubCutaneous every 12 hours  melatonin 3 milliGRAM(s) Oral at bedtime  pantoprazole    Tablet 40 milliGRAM(s) Oral before breakfast  tiotropium 2.5 MICROgram(s) Inhaler 2 Puff(s) Inhalation daily    MEDICATIONS  (PRN):  acetaminophen     Tablet .. 650 milliGRAM(s) Oral every 6 hours PRN Mild Pain (1 - 3), Moderate Pain (4 - 6)    Vital Signs Last 24 Hrs  T(C): 37.1 (30 Jan 2023 11:20), Max: 37.3 (29 Jan 2023 20:46)  T(F): 98.8 (30 Jan 2023 11:20), Max: 99.2 (29 Jan 2023 20:46)  HR: 92 (30 Jan 2023 11:20) (77 - 100)  BP: 160/78 (30 Jan 2023 11:20) (144/90 - 160/78)  BP(mean): --  RR: 19 (30 Jan 2023 11:20) (18 - 19)  SpO2: 94% (30 Jan 2023 11:20) (93% - 95%)    Parameters below as of 30 Jan 2023 11:20  Patient On (Oxygen Delivery Method): nasal cannula  O2 Flow (L/min): 2      I&O's Summary    29 Jan 2023 07:01  -  30 Jan 2023 07:00  --------------------------------------------------------  IN: 0 mL / OUT: 350 mL / NET: -350 mL          Physical Exam:   GENERAL: NAD, well-groomed, well-developed  HEENT: LAXMI/   Atraumatic, Normocephalic  ENMT: No tonsillar erythema, exudates, or enlargement; Moist mucous membranes, Good dentition, No lesions  NECK: Supple, No JVD, Normal thyroid  CHEST/LUNG: bibasaislr coarse crackls+  CVS: Regular rate and rhythm; No murmurs, rubs, or gallops  GI: : Soft, Nontender, Nondistended; Bowel sounds present  NERVOUS SYSTEM:  Alert & Oriented X3  EXTREMITIES: - edema  LYMPH: No lymphadenopathy noted  SKIN: No rashes or lesions  ENDOCRINOLOGY: No Thyromegaly  PSYCH: Appropriate    Labs:  24                            12.2   11.64 )-----------( 291      ( 30 Jan 2023 06:51 )             36.9                         11.9   10.91 )-----------( 257      ( 29 Jan 2023 07:03 )             36.3                         12.8   15.42 )-----------( 236      ( 28 Jan 2023 06:23 )             38.5                         14.3   21.00 )-----------( 250      ( 27 Jan 2023 14:45 )             42.9     01-28    136  |  101  |  17  ----------------------------<  104<H>  3.8   |  22  |  0.61  01-27    138  |  100  |  18  ----------------------------<  121<H>  3.8   |  22  |  0.72      TPro  7.0  /  Alb  3.4  /  TBili  0.6  /  DBili  x   /  AST  14  /  ALT  25  /  AlkPhos  116  01-28  TPro  7.5  /  Alb  3.8  /  TBili  0.8  /  DBili  x   /  AST  15  /  ALT  28  /  AlkPhos  125<H>  01-27    CAPILLARY BLOOD GLUCOSE      rad< from: CT Angio Chest PE Protocol w/ IV Cont (01.27.23 @ 17:54) >    LUNGS AND AIRWAYS: Patent central airways.  Emphysema. Patchy opacities   in the right upper and bilateral lower lobes with superimposed right   lower lobe consolidation, new since 2/8/2022.  PLEURA: No pleural effusion.  MEDIASTINUM AND ISAIAH: No lymphadenopathy.  VESSELS: No pulmonary embolism. Coronary artery and aortic calcifications.  HEART: Heart size is normal. No pericardial effusion.  CHEST WALL AND LOWER NECK: Within normal limits.  VISUALIZED UPPER ABDOMEN: Cholecystectomy. Previously seen enhancing   hepatic lesion is not visualized, possibly due to recurrences in contrast   timing.  BONES: Degenerative changes. Unchanged sclerotic focus in the manubrium.    IMPRESSION:  No pulmonary embolism.    Patchy bilateral opacities predominantly in the right lower lobe   concerning for multifocal infection. Recommend follow-up CT chest in 8-10   weeks for resolution.    --- End of Report ---           MIHIR GANDARA MD; Resident Radiology  This document has been electronically signed.  HAILEY HODGE MD; Attending Radiologist  This document has been electronically signed. Jan 27 2023  7:10PM    < end of copied text >  < from: Xray Chest 2 Views PA/Lat (01.27.23 @ 13:51) >  ACC: [id number] EXAM:  XR CHEST PA LAT 2V   ORDERED BY:  ELLI FELICIANO     PROCEDURE DATE:  01/27/2023          INTERPRETATION:  CLINICAL INFORMATION: Sepsis.    EXAM: 1 view of the chest.    COMPARISON: Chest radiograph CT chest 2/8/2022    FINDINGS:  The lungs are hyperaerated. No focal consolidation. Right lower lung   linear atelectasis. No pleural effusion or pneumothorax.  The heart is normal in size.  No acute osseous findings.    IMPRESSION:  Hyperaerated lungs with right lower lung linear atelectasis with no focal   consolidation.    --- End of Report ---           AMARJIT JEFFRIES MD; Resident Radiologist  This document has been electronically signed.  JINNY SYED MD; Attending Radiologist  This document has been electronically signed. Jan 27 2023  3:10PM    < end of copied text >  < from: CT Neck Soft Tissue w/ IV Cont (02.08.22 @ 08:24) >  outside CT examination of the neck from 7/12/2018. Most recent prior   whole body PET/CT study from 3/25/2019.    FINDINGS: Posttreatment changes are again seen within the aerodigestive   tract. There is streak and beam hardening artifact generated by dental   amalgam. This limits evaluation of the surrounding structures,   particularly the oral cavity. No masslike or nodular enhancement is seen   within the left supraglottic location.    There is no cervical lymphadenopathy.    The bilateral parotid and submandibular glands appear unremarkable as   well as the thyroid gland.    The neck vessels remain patent. Arterial vascular calcifications are seen.    The imaged intracranial and orbital structures appear unremarkable apart   from atherosclerosis.    The paranasal sinuses and mastoid air cells are clear.    The maxilla and mandible appear intact. Nonspecific periapical   radiolucencies are seen about the roots of multiple teeth. Multiple   carious teeth are also seen. The TMJ spaces appear unremarkable.    Multilevel cervical spondylosis appears unchanged. There is an unchanged   small probable bone island within the manubrium of the sternum on the   left side.    Emphysema is seen within the lungs. Please refer to the report for the   concurrent dedicated chest CT for findings regarding the thoracic   structures.    IMPRESSION: Stable follow-up CT examination without evidence of recurrent   or metastatic disease.    --- End of Report ---      < end of copied text >            Lactate, Blood: 1.2 mmol/L (01-27 @ 18:20)        RECENT CULTURES:  01-27 @ 16:17 Clean Catch Clean Catch (Midstream)                No growth    01-27 @ 13:35 .Blood Blood-Peripheral                No growth to date.    01-27 @ 13:00 .Blood Blood-Peripheral                No growth to date.          RESPIRATORY CULTURES:          Studies  Chest X-RAY  CT SCAN Chest   Venous Dopplers: LE:   CT Abdomen  Others              
Patient is a 67y old  Male who presents with a chief complaint of SOB / multi lobar PNA (2023 12:54)      INTERVAL HPI/OVERNIGHT EVENTS: feeling lot better   T(C): 37.2 (23 @ 20:47), Max: 37.2 (23 @ 20:47)  HR: 95 (23 @ 20:47) (84 - 96)  BP: 144/85 (23 @ 20:47) (119/73 - 144/85)  RR: 18 (23 @ 20:47) (18 - 18)  SpO2: 94% (23 @ 20:47) (94% - 96%)  Wt(kg): --  I&O's Summary    2023 07:01  -  2023 03:24  --------------------------------------------------------  IN: 360 mL / OUT: 300 mL / NET: 60 mL        PAST MEDICAL & SURGICAL HISTORY:  Laryngeal cancer      Sleep apnea  non-compliant      S/P cholecystectomy  laparoscopic           SOCIAL HISTORY  Alcohol:  Tobacco:  Illicit substance use:    FAMILY HISTORY:    REVIEW OF SYSTEMS:  CONSTITUTIONAL: No fever, weight loss, or fatigue  EYES: No eye pain, visual disturbances, or discharge  ENMT:  No difficulty hearing, tinnitus, vertigo; No sinus or throat pain  NECK: No pain or stiffness  RESPIRATORY: No cough, wheezing, chills or hemoptysis; No shortness of breath  CARDIOVASCULAR: No chest pain, palpitations, dizziness, or leg swelling  GASTROINTESTINAL: No abdominal or epigastric pain. No nausea, vomiting, or hematemesis; No diarrhea or constipation. No melena or hematochezia.  GENITOURINARY: No dysuria, frequency, hematuria, or incontinence  NEUROLOGICAL: No headaches, memory loss, loss of strength, numbness, or tremors  SKIN: No itching, burning, rashes, or lesions   LYMPH NODES: No enlarged glands  ENDOCRINE: No heat or cold intolerance; No hair loss  MUSCULOSKELETAL: No joint pain or swelling; No muscle, back, or extremity pain  PSYCHIATRIC: No depression, anxiety, mood swings, or difficulty sleeping  HEME/LYMPH: No easy bruising, or bleeding gums  ALLERY AND IMMUNOLOGIC: No hives or eczema    RADIOLOGY & ADDITIONAL TESTS:    Imaging Personally Reviewed:  [ ] YES  [ ] NO    Consultant(s) Notes Reviewed:  [ ] YES  [ ] NO    PHYSICAL EXAM:  GENERAL: NAD, well-groomed, well-developed  HEAD:  Atraumatic, Normocephalic  EYES: EOMI, PERRLA, conjunctiva and sclera clear  ENMT: No tonsillar erythema, exudates, or enlargement; Moist mucous membranes, Good dentition, No lesions  NECK: Supple, No JVD, Normal thyroid  NERVOUS SYSTEM:  Alert & Oriented X3, Good concentration; Motor Strength 5/5 B/L upper and lower extremities; DTRs 2+ intact and symmetric  CHEST/LUNG: Clear to percussion bilaterally; No rales, rhonchi, wheezing, or rubs  HEART: Regular rate and rhythm; No murmurs, rubs, or gallops  ABDOMEN: Soft, Nontender, Nondistended; Bowel sounds present  EXTREMITIES:  2+ Peripheral Pulses, No clubbing, cyanosis, or edema  LYMPH: No lymphadenopathy noted  SKIN: No rashes or lesions    LABS:                        12.8   15.42 )-----------( 236      ( 2023 06:23 )             38.5         136  |  101  |  17  ----------------------------<  104<H>  3.8   |  22  |  0.61    Ca    9.3      2023 06:20    TPro  7.0  /  Alb  3.4  /  TBili  0.6  /  DBili  x   /  AST  14  /  ALT  25  /  AlkPhos  116      PT/INR - ( 2023 14:45 )   PT: 14.3 sec;   INR: 1.23 ratio         PTT - ( 2023 14:45 )  PTT:28.4 sec  Urinalysis Basic - ( 2023 16:17 )    Color: Dark Yellow / Appearance: Slightly Turbid / S.039 / pH: x  Gluc: x / Ketone: Small  / Bili: Small / Urobili: 8 mg/dL   Blood: x / Protein: 100 mg/dL / Nitrite: Negative   Leuk Esterase: Negative / RBC: 7 /hpf / WBC 11 /HPF   Sq Epi: x / Non Sq Epi: 9 /hpf / Bacteria: Negative      CAPILLARY BLOOD GLUCOSE            Urinalysis Basic - ( 2023 16:17 )    Color: Dark Yellow / Appearance: Slightly Turbid / S.039 / pH: x  Gluc: x / Ketone: Small  / Bili: Small / Urobili: 8 mg/dL   Blood: x / Protein: 100 mg/dL / Nitrite: Negative   Leuk Esterase: Negative / RBC: 7 /hpf / WBC 11 /HPF   Sq Epi: x / Non Sq Epi: 9 /hpf / Bacteria: Negative        MEDICATIONS  (STANDING):  albuterol/ipratropium for Nebulization 3 milliLiter(s) Nebulizer every 6 hours  azithromycin  IVPB 500 milliGRAM(s) IV Intermittent every 24 hours  azithromycin  IVPB      cefTRIAXone   IVPB 1000 milliGRAM(s) IV Intermittent every 24 hours  heparin   Injectable 5000 Unit(s) SubCutaneous every 12 hours  melatonin 3 milliGRAM(s) Oral at bedtime  pantoprazole    Tablet 40 milliGRAM(s) Oral before breakfast  tiotropium 2.5 MICROgram(s) Inhaler 2 Puff(s) Inhalation daily    MEDICATIONS  (PRN):      Care Discussed with Consultants/Other Providers [ ] YES  [ ] NO

## 2023-02-01 NOTE — DISCHARGE NOTE PROVIDER - PROVIDER TOKENS
FREE:[LAST:[Primary Care Provider],PHONE:[(   )    -],FAX:[(   )    -],FOLLOWUP:[1 week]] FREE:[LAST:[Primary Care Provider],PHONE:[(   )    -],FAX:[(   )    -],FOLLOWUP:[1 week]],PROVIDER:[TOKEN:[[id number]],FOLLOWUP:[1 week]]

## 2023-02-01 NOTE — PROGRESS NOTE ADULT - TIME BILLING
Patient seen and examined.  Agree with above PA note.  cv stable  improved atypical pler cp  echo with nl lv fxn, no effusion  cont supportive care  med f/u  dvt ppx

## 2023-02-01 NOTE — PROGRESS NOTE ADULT - ASSESSMENT
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     
67 with PNA/COPD/former smoker/history of oral cancer    - cont abx  - cont LAMA  - wean O2 as tolerated  - CT chest 2-3 months f/u      1/30:    67 with PNA/COPD/former smoker/history of oral cancer      Pneumonia:/ Rhinovirus infection:  : cont antibiotics : afebrile : try to wean oxygen off   copd: on stiolto : bd prn  : not wheezing:  he is on 2 L of oxygen : try to wean oxygen off as tolerated  hx of oral cancer: being followed by oral surgeon :   PRISCA : not using cpap:  dvt prophylaxis  needs pt ot     DW ACP 
A/P    66 yo M, hx of laryngeal cancer s/p radiation, presents with shortness of breath for 4 days.     #Dyspnea, PNA, acute viral illness  -cont abx/supportive care per med/pulmonary  -cta no PE    #chest pain  -atypical, pleuritic, improved  -cta neg for PE  -F/u echo  -trend ecg's  -trend trop if recurs    dvt ppx    
A/P    68 yo M, hx of laryngeal cancer s/p radiation, presents with shortness of breath for 4 days.     #Dyspnea, PNA, acute viral illness  -cont abx/supportive care per med/pulmonary  -f/u bx  -cta no PE    #chest pain  -atypical, pleuritic, improved  -cta neg for PE  -check echo   -trend ecg's  -trend trop if recurs    dvt ppx      35 minutes spent on total encounter; more than 50% of the visit was spent counseling and/or coordinating care by the attending physician.  
Echo 1/30/23: Nml LV fxn EF 65%    A/P  66 yo M, hx of laryngeal cancer s/p radiation, presents with shortness of breath for 4 days.     #Dyspnea, PNA, acute viral illness  -cont abx/supportive care per med/pulmonary  -cta no PE    #chest pain  -atypical, pleuritic, improved  -cta neg for PE  -Echo w/ nml LV fxn  -trend trop if recurs      dvt ppx    
  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     
67 with PNA/COPD/former smoker/history of oral cancer    - cont abx  - cont LAMA  - wean O2 as tolerated  - CT chest 2-3 months f/u
67 with PNA/COPD/former smoker/history of oral cancer    - cont abx  - cont LAMA  - wean O2 as tolerated  - CT chest 2-3 months f/u      1/30:    67 with PNA/COPD/former smoker/history of oral cancer      Pneumonia:/ Rhinovirus infection:  : cont antibiotics : afebrile : try to wean oxygen off   copd: on stiolto : bd prn  : not wheezing:  he is on 2 L of oxygen : try to wean oxygen off as tolerated  hx of oral cancer: being followed by oral surgeon :   PRISCA : not using cpap:  dvt prophylaxis  needs pt ot     DW ACP     1/31:    Pneumonia:/ Rhinovirus infection:  : cont antibiotics : afebrile : try to wean oxygen off  : wbc mildly elevated today    copd: on stiolto : bd prn  : not wheezing:  he is on 2 L of oxygen : try to wean oxygen off as tolerated  hx of oral cancer: being followed by oral surgeon :   PRISCA : not using cpap:  dvt prophylaxis  needs pt ot     DW ACP 
Echo 1/30/23: Nml LV fxn EF 65%    A/P  66 yo M, hx of laryngeal cancer s/p radiation, presents with shortness of breath for 4 days.     #Dyspnea, PNA, acute viral illness  -cont abx/supportive care per med/pulmonary  -cta no PE    #chest pain  -atypical, pleuritic, improved  -cta neg for PE  -Echo w/ nml LV fxn  -trend trop if recurs      dvt ppx    
  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     
67 with PNA/COPD/former smoker/history of oral cancer    - cont abx  - cont LAMA  - wean O2 as tolerated  - CT chest 2-3 months f/u      1/30:    67 with PNA/COPD/former smoker/history of oral cancer      Pneumonia:/ Rhinovirus infection:  : cont antibiotics : afebrile : try to wean oxygen off   copd: on stiolto : bd prn  : not wheezing:  he is on 2 L of oxygen : try to wean oxygen off as tolerated  hx of oral cancer: being followed by oral surgeon :   PRISCA : not using cpap:  dvt prophylaxis  needs pt ot     KAILEE REYES     1/31:    Pneumonia:/ Rhinovirus infection:  : cont antibiotics : afebrile : try to wean oxygen off  : wbc mildly elevated today    copd: on stiolto : bd prn  : not wheezing:  he is on 2 L of oxygen : try to wean oxygen off as tolerated  hx of oral cancer: being followed by oral surgeon :   PRISCA : not using cpap:  dvt prophylaxis  needs pt ot     KAILEE REYES     2/1:    Pneumonia:/ Rhinovirus infection:  : cont antibiotics : afebrile : off oxygen : he did nto desaturate on ambulation:  went down to 91% only : he feels fine:  legionella is negative:  now going home on 3 days of oral antibiotics:   copd: on stiolto : bd prn  : not wheezing:  off oxygen   hx of oral cancer: being followed by oral surgeon :   PRISCA : not using cpap:  dvt prophylaxis  needs pt ot :  kailee DUGAN [facility]

## 2023-02-01 NOTE — DISCHARGE NOTE PROVIDER - NSDCCPCAREPLAN_GEN_ALL_CORE_FT
PRINCIPAL DISCHARGE DIAGNOSIS  Diagnosis: Pneumonia  Assessment and Plan of Treatment: Continue antibiotics      SECONDARY DISCHARGE DIAGNOSES  Diagnosis: Infection, enterovirus  Assessment and Plan of Treatment: Supportive care, hydration     PRINCIPAL DISCHARGE DIAGNOSIS  Diagnosis: Pneumonia  Assessment and Plan of Treatment: Follow-up CT chest in 8-10 weeks for resolution  Pneumonia is a lung infection that can cause a fever, cough, and trouble breathing.  Continue all antibiotics as ordered until complete.  Nutrition is important, eat small frequent meals.  Get lots of rest and drink fluids.  Call your health care provider upon arrival home from hospital and make a follow up appointment for one week.  If your cough worsens, you develop fever greater than 101', you have shaking chills, a fast heartbeat, trouble breathing and/or feel your are breathing much faster than usual, call your healthcare provider.  Make sure you wash your hands frequently.        SECONDARY DISCHARGE DIAGNOSES  Diagnosis: Infection, enterovirus  Assessment and Plan of Treatment: Supportive care, hydration     PRINCIPAL DISCHARGE DIAGNOSIS  Diagnosis: Pneumonia  Assessment and Plan of Treatment: Follow-up CT chest in 8-10 weeks for resolution with a pulmonologist. Please also follow up with your primary care doctor.   Pneumonia is a lung infection that can cause a fever, cough, and trouble breathing.  Continue all antibiotics as ordered until complete.  Nutrition is important, eat small frequent meals.  Get lots of rest and drink fluids.  Call your health care provider upon arrival home from hospital and make a follow up appointment for one week.  If your cough worsens, you develop fever greater than 101', you have shaking chills, a fast heartbeat, trouble breathing and/or feel your are breathing much faster than usual, call your healthcare provider.  Make sure you wash your hands frequently.        SECONDARY DISCHARGE DIAGNOSES  Diagnosis: Infection, enterovirus  Assessment and Plan of Treatment: Supportive care, hydration

## 2023-02-01 NOTE — DISCHARGE NOTE PROVIDER - NSDCMRMEDTOKEN_GEN_ALL_CORE_FT
cefuroxime 500 mg oral tablet: 1 tab(s) orally 2 times a day   pantoprazole 40 mg oral delayed release tablet: 1 tab(s) orally once a day (before a meal)   Albuterol (Eqv-ProAir HFA) 90 mcg/inh inhalation aerosol: 2 puff(s) inhaled every 6 hours   cefuroxime 500 mg oral tablet: 1 tab(s) orally 2 times a day   pantoprazole 40 mg oral delayed release tablet: 1 tab(s) orally once a day (before a meal)  tiotropium 2.5 mcg/inh inhalation aerosol: 2 puff(s) inhaled once a day

## 2023-02-01 NOTE — DISCHARGE NOTE NURSING/CASE MANAGEMENT/SOCIAL WORK - PATIENT PORTAL LINK FT
You can access the FollowMyHealth Patient Portal offered by [facility] by registering at the following website: [email or web address]. By joining [facility]’s FollowMyHealth portal, you will also be able to view your health information using other applications (apps) compatible with our system.

## 2023-02-01 NOTE — DISCHARGE NOTE PROVIDER - NSFOLLOWUPCLINICS_GEN_ALL_ED_FT
[facility] [place] [facility] [place]  Pulmonary Medicine  [address]  [place] [address]  Phone: [phone]  Fax:   Follow Up Time: 1 week    [facility] Pulmonolgy and Sleep Medicine  Pulmonology  [address]  [place] [address]  Phone: [phone]  Fax:   Follow Up Time: 1 week

## 2023-02-14 ENCOUNTER — TRANSCRIPTION ENCOUNTER (OUTPATIENT)
Age: 68
End: 2023-02-14

## 2023-02-21 ENCOUNTER — NON-APPOINTMENT (OUTPATIENT)
Age: 68
End: 2023-02-21

## 2023-02-22 ENCOUNTER — TRANSCRIPTION ENCOUNTER (OUTPATIENT)
Age: 68
End: 2023-02-22

## 2023-02-23 LAB
T3FREE SERPL-MCNC: 2.94 PG/ML
T4 FREE SERPL-MCNC: 1.1 NG/DL
TSH SERPL-ACNC: 2.17 UIU/ML

## 2023-02-28 ENCOUNTER — TRANSCRIPTION ENCOUNTER (OUTPATIENT)
Age: 68
End: 2023-02-28

## 2023-03-03 ENCOUNTER — TRANSCRIPTION ENCOUNTER (OUTPATIENT)
Age: 68
End: 2023-03-03

## 2023-03-29 ENCOUNTER — OUTPATIENT (OUTPATIENT)
Dept: OUTPATIENT SERVICES | Facility: HOSPITAL | Age: 68
LOS: 1 days | End: 2023-03-29
Payer: MEDICARE

## 2023-03-29 ENCOUNTER — APPOINTMENT (OUTPATIENT)
Dept: CT IMAGING | Facility: IMAGING CENTER | Age: 68
End: 2023-03-29
Payer: MEDICARE

## 2023-03-29 DIAGNOSIS — Z00.8 ENCOUNTER FOR OTHER GENERAL EXAMINATION: ICD-10-CM

## 2023-03-29 DIAGNOSIS — Z90.49 ACQUIRED ABSENCE OF OTHER SPECIFIED PARTS OF DIGESTIVE TRACT: Chronic | ICD-10-CM

## 2023-03-29 PROCEDURE — 71260 CT THORAX DX C+: CPT | Mod: MH

## 2023-03-29 PROCEDURE — 70491 CT SOFT TISSUE NECK W/DYE: CPT | Mod: 26,MH

## 2023-03-29 PROCEDURE — 71260 CT THORAX DX C+: CPT | Mod: 26,MH

## 2023-03-29 PROCEDURE — 70491 CT SOFT TISSUE NECK W/DYE: CPT | Mod: MH

## 2023-03-31 ENCOUNTER — APPOINTMENT (OUTPATIENT)
Dept: PULMONOLOGY | Facility: CLINIC | Age: 68
End: 2023-03-31
Payer: MEDICARE

## 2023-03-31 VITALS
SYSTOLIC BLOOD PRESSURE: 160 MMHG | DIASTOLIC BLOOD PRESSURE: 80 MMHG | BODY MASS INDEX: 25.92 KG/M2 | HEIGHT: 69.3 IN | HEART RATE: 56 BPM | WEIGHT: 177 LBS | OXYGEN SATURATION: 96 %

## 2023-03-31 PROCEDURE — 94729 DIFFUSING CAPACITY: CPT

## 2023-03-31 PROCEDURE — 94060 EVALUATION OF WHEEZING: CPT

## 2023-03-31 PROCEDURE — 94726 PLETHYSMOGRAPHY LUNG VOLUMES: CPT

## 2023-05-03 ENCOUNTER — APPOINTMENT (OUTPATIENT)
Dept: NUCLEAR MEDICINE | Facility: IMAGING CENTER | Age: 68
End: 2023-05-03
Payer: MEDICARE

## 2023-05-03 ENCOUNTER — OUTPATIENT (OUTPATIENT)
Dept: OUTPATIENT SERVICES | Facility: HOSPITAL | Age: 68
LOS: 1 days | End: 2023-05-03
Payer: MEDICARE

## 2023-05-03 DIAGNOSIS — Z00.8 ENCOUNTER FOR OTHER GENERAL EXAMINATION: ICD-10-CM

## 2023-05-03 DIAGNOSIS — Z90.49 ACQUIRED ABSENCE OF OTHER SPECIFIED PARTS OF DIGESTIVE TRACT: Chronic | ICD-10-CM

## 2023-05-03 PROCEDURE — 78815 PET IMAGE W/CT SKULL-THIGH: CPT | Mod: MH

## 2023-05-03 PROCEDURE — A9552: CPT

## 2023-05-03 PROCEDURE — 78815 PET IMAGE W/CT SKULL-THIGH: CPT | Mod: 26,PI,MH

## 2023-05-11 ENCOUNTER — NON-APPOINTMENT (OUTPATIENT)
Age: 68
End: 2023-05-11

## 2023-06-08 ENCOUNTER — APPOINTMENT (OUTPATIENT)
Dept: OTOLARYNGOLOGY | Facility: CLINIC | Age: 68
End: 2023-06-08

## 2023-06-22 ENCOUNTER — APPOINTMENT (OUTPATIENT)
Dept: OTOLARYNGOLOGY | Facility: CLINIC | Age: 68
End: 2023-06-22
Payer: MEDICARE

## 2023-06-22 VITALS
DIASTOLIC BLOOD PRESSURE: 99 MMHG | HEIGHT: 69.3 IN | BODY MASS INDEX: 25.92 KG/M2 | SYSTOLIC BLOOD PRESSURE: 181 MMHG | OXYGEN SATURATION: 97 % | HEART RATE: 70 BPM | WEIGHT: 177 LBS

## 2023-06-22 PROCEDURE — 99214 OFFICE O/P EST MOD 30 MIN: CPT | Mod: 25

## 2023-06-22 PROCEDURE — 31575 DIAGNOSTIC LARYNGOSCOPY: CPT

## 2023-06-22 RX ORDER — IBUPROFEN AND PSEUDOEPHEDRINE HYDROCHLORIDE 200; 30 MG/1; MG/1
TABLET, COATED ORAL
Refills: 0 | Status: COMPLETED | COMMUNITY
End: 2023-06-22

## 2023-06-22 NOTE — HISTORY OF PRESENT ILLNESS
[de-identified] : Mr. Monroy is a 66 yo male is here for follow up with history of SCCa of left supraglottis. S/P RT completed on 12/27/18. CT chest/neck 2/2022 without signs of recurrence. \par h/o pneumonia and was hospitalized at [facility] 1/27/2023-2/1/2023\par PETCT from 5/3/2023 did not show signs of liver lesions or recurrence of the head and neck cancer. Lung findings seem to be better but they are recommending repeating a CT scan in 3 months.\par Pt would discuss scan with pulmonologist and repeat CT chest in Aug 2023. \par Today pt reports throat phlegm is a lot better. Reports swallowing is okay. No dysphagia, dyspnea or odynophonia. \par Occasional voice hoarseness on and off. Taste is better and worse on some days. \par No fever, chills, or weight loss. Eating and drinking without issues.

## 2023-07-07 NOTE — ED ADULT NURSE NOTE - NS ED NURSE DISCH DISPOSITION
Quality 226: Preventive Care And Screening: Tobacco Use: Screening And Cessation Intervention: Patient screened for tobacco use and is an ex/non-smoker Quality 111:Pneumonia Vaccination Status For Older Adults: Patient received any pneumococcal conjugate or polysaccharide vaccine on or after their 60th birthday and before the end of the measurement period Quality 431: Preventive Care And Screening: Unhealthy Alcohol Use - Screening: Patient not identified as an unhealthy alcohol user when screened for unhealthy alcohol use using a systematic screening method Detail Level: Detailed Quality 110: Preventive Care And Screening: Influenza Immunization: Influenza Immunization Administered during Influenza season Admitted

## 2023-09-06 ENCOUNTER — APPOINTMENT (OUTPATIENT)
Dept: CT IMAGING | Facility: IMAGING CENTER | Age: 68
End: 2023-09-06
Payer: MEDICARE

## 2023-09-06 ENCOUNTER — OUTPATIENT (OUTPATIENT)
Dept: OUTPATIENT SERVICES | Facility: HOSPITAL | Age: 68
LOS: 1 days | End: 2023-09-06
Payer: MEDICARE

## 2023-09-06 DIAGNOSIS — Z90.49 ACQUIRED ABSENCE OF OTHER SPECIFIED PARTS OF DIGESTIVE TRACT: Chronic | ICD-10-CM

## 2023-09-06 DIAGNOSIS — C32.9 MALIGNANT NEOPLASM OF LARYNX, UNSPECIFIED: ICD-10-CM

## 2023-09-06 PROCEDURE — 71260 CT THORAX DX C+: CPT | Mod: 26,MH

## 2023-09-06 PROCEDURE — 71260 CT THORAX DX C+: CPT

## 2023-12-07 ENCOUNTER — APPOINTMENT (OUTPATIENT)
Dept: OTOLARYNGOLOGY | Facility: CLINIC | Age: 68
End: 2023-12-07
Payer: MEDICARE

## 2023-12-07 VITALS
HEART RATE: 58 BPM | BODY MASS INDEX: 24.5 KG/M2 | SYSTOLIC BLOOD PRESSURE: 172 MMHG | OXYGEN SATURATION: 96 % | WEIGHT: 175 LBS | HEIGHT: 71 IN | DIASTOLIC BLOOD PRESSURE: 104 MMHG

## 2023-12-07 PROCEDURE — 31575 DIAGNOSTIC LARYNGOSCOPY: CPT

## 2023-12-07 PROCEDURE — 99213 OFFICE O/P EST LOW 20 MIN: CPT | Mod: 25

## 2024-05-19 ENCOUNTER — NON-APPOINTMENT (OUTPATIENT)
Age: 69
End: 2024-05-19

## 2024-06-01 NOTE — H&P PST ADULT - GASTROINTESTINAL DETAILS
Occupational Therapy                 Therapy Communication Note    Patient Name: Sherri Grace  MRN: [id number]  Today's Date: 6/1/2024     Discipline: Occupational Therapy    Missed Visit Reason:  Chart review complete. Ortho consult pending for right wrist fracture. Will hold OT evaluation and complete only as appropriate.     Missed Time: Attempt       bowel sounds normal/soft/nontender/no distention

## 2024-06-13 ENCOUNTER — APPOINTMENT (OUTPATIENT)
Dept: OTOLARYNGOLOGY | Facility: CLINIC | Age: 69
End: 2024-06-13
Payer: MEDICARE

## 2024-06-13 DIAGNOSIS — C32.9 MALIGNANT NEOPLASM OF LARYNX, UNSPECIFIED: ICD-10-CM

## 2024-06-13 PROCEDURE — 99213 OFFICE O/P EST LOW 20 MIN: CPT | Mod: 25

## 2024-06-13 PROCEDURE — 31575 DIAGNOSTIC LARYNGOSCOPY: CPT

## 2024-06-13 NOTE — HISTORY OF PRESENT ILLNESS
[de-identified] : Mr. Monroy is a 67 yo male here for follow up with history of SCCa of left supraglottis. S/P RT completed on 12/27/18.  h/o pneumonia and was hospitalized at [facility] 1/27/2023-2/1/2023 PETCT from 5/3/2023 did not show signs of liver lesions or recurrence of the head and neck cancer. Lung findings seem to be better but they are recommending repeating a CT scan in 3 months. CT chest done 9/6/23 was normal.  Today pt reports swallowing is okay. No dysphagia, dyspnea or odynophonia.  Occasional voice hardly comes out. Taste is better and worse on some days.  Patient had a cold 2 months ago and ended up having a chest Xray and was told that they were not sure about the findings. No fever, chills, or weight loss. Eating and drinking without issues.

## 2024-06-19 ENCOUNTER — NON-APPOINTMENT (OUTPATIENT)
Age: 69
End: 2024-06-19

## 2024-06-19 ENCOUNTER — APPOINTMENT (OUTPATIENT)
Dept: OPHTHALMOLOGY | Facility: CLINIC | Age: 69
End: 2024-06-19
Payer: MEDICARE

## 2024-06-19 PROCEDURE — 92004 COMPRE OPH EXAM NEW PT 1/>: CPT

## 2024-06-19 PROCEDURE — 76514 ECHO EXAM OF EYE THICKNESS: CPT

## 2024-06-19 PROCEDURE — 92136 OPHTHALMIC BIOMETRY: CPT

## 2024-06-19 PROCEDURE — 92133 CPTRZD OPH DX IMG PST SGM ON: CPT

## 2024-07-02 ENCOUNTER — APPOINTMENT (OUTPATIENT)
Dept: OPHTHALMOLOGY | Facility: CLINIC | Age: 69
End: 2024-07-02
Payer: MEDICARE

## 2024-07-02 ENCOUNTER — NON-APPOINTMENT (OUTPATIENT)
Age: 69
End: 2024-07-02

## 2024-07-02 PROCEDURE — 92012 INTRM OPH EXAM EST PATIENT: CPT

## 2024-07-02 PROCEDURE — 92083 EXTENDED VISUAL FIELD XM: CPT

## 2024-07-02 PROCEDURE — 92020 GONIOSCOPY: CPT

## 2024-08-01 ENCOUNTER — NON-APPOINTMENT (OUTPATIENT)
Age: 69
End: 2024-08-01

## 2024-08-01 ENCOUNTER — APPOINTMENT (OUTPATIENT)
Dept: OPHTHALMOLOGY | Facility: CLINIC | Age: 69
End: 2024-08-01
Payer: MEDICARE

## 2024-08-01 PROCEDURE — 92136 OPHTHALMIC BIOMETRY: CPT

## 2024-08-01 PROCEDURE — 92012 INTRM OPH EXAM EST PATIENT: CPT

## 2024-09-04 ENCOUNTER — APPOINTMENT (OUTPATIENT)
Dept: CT IMAGING | Facility: IMAGING CENTER | Age: 69
End: 2024-09-04
Payer: MEDICARE

## 2024-09-04 ENCOUNTER — OUTPATIENT (OUTPATIENT)
Dept: OUTPATIENT SERVICES | Facility: HOSPITAL | Age: 69
LOS: 1 days | End: 2024-09-04
Payer: MEDICARE

## 2024-09-04 DIAGNOSIS — C32.9 MALIGNANT NEOPLASM OF LARYNX, UNSPECIFIED: ICD-10-CM

## 2024-09-04 DIAGNOSIS — Z90.49 ACQUIRED ABSENCE OF OTHER SPECIFIED PARTS OF DIGESTIVE TRACT: Chronic | ICD-10-CM

## 2024-09-04 PROCEDURE — 70491 CT SOFT TISSUE NECK W/DYE: CPT | Mod: 26,MH

## 2024-09-04 PROCEDURE — 71260 CT THORAX DX C+: CPT

## 2024-09-04 PROCEDURE — 71260 CT THORAX DX C+: CPT | Mod: 26,MH

## 2024-09-04 PROCEDURE — 70491 CT SOFT TISSUE NECK W/DYE: CPT

## 2024-09-16 ENCOUNTER — APPOINTMENT (OUTPATIENT)
Dept: OPHTHALMOLOGY | Facility: AMBULATORY SURGERY CENTER | Age: 69
End: 2024-09-16
Payer: MEDICARE

## 2024-09-16 PROCEDURE — 66982 XCAPSL CTRC RMVL CPLX WO ECP: CPT | Mod: LT

## 2024-09-17 ENCOUNTER — APPOINTMENT (OUTPATIENT)
Dept: OPHTHALMOLOGY | Facility: CLINIC | Age: 69
End: 2024-09-17
Payer: MEDICARE

## 2024-09-17 ENCOUNTER — NON-APPOINTMENT (OUTPATIENT)
Age: 69
End: 2024-09-17

## 2024-09-17 PROCEDURE — 99024 POSTOP FOLLOW-UP VISIT: CPT

## 2024-09-24 ENCOUNTER — APPOINTMENT (OUTPATIENT)
Dept: OPHTHALMOLOGY | Facility: CLINIC | Age: 69
End: 2024-09-24
Payer: MEDICARE

## 2024-09-24 ENCOUNTER — NON-APPOINTMENT (OUTPATIENT)
Age: 69
End: 2024-09-24

## 2024-09-24 PROCEDURE — 99024 POSTOP FOLLOW-UP VISIT: CPT

## 2024-10-01 ENCOUNTER — APPOINTMENT (OUTPATIENT)
Dept: OPHTHALMOLOGY | Facility: CLINIC | Age: 69
End: 2024-10-01
Payer: MEDICARE

## 2024-10-01 ENCOUNTER — NON-APPOINTMENT (OUTPATIENT)
Age: 69
End: 2024-10-01

## 2024-10-01 PROCEDURE — 99024 POSTOP FOLLOW-UP VISIT: CPT

## 2024-10-11 ENCOUNTER — NON-APPOINTMENT (OUTPATIENT)
Age: 69
End: 2024-10-11

## 2024-10-11 ENCOUNTER — INPATIENT (INPATIENT)
Facility: HOSPITAL | Age: 69
LOS: 3 days | Discharge: HOME HEALTH | DRG: 195 | End: 2024-10-15
Attending: HOSPITALIST | Admitting: STUDENT IN AN ORGANIZED HEALTH CARE EDUCATION/TRAINING PROGRAM
Payer: MEDICARE

## 2024-10-11 VITALS
TEMPERATURE: 100 F | HEART RATE: 109 BPM | WEIGHT: 175.93 LBS | OXYGEN SATURATION: 93 % | SYSTOLIC BLOOD PRESSURE: 135 MMHG | DIASTOLIC BLOOD PRESSURE: 87 MMHG | HEIGHT: 71 IN | RESPIRATION RATE: 20 BRPM

## 2024-10-11 DIAGNOSIS — J18.9 PNEUMONIA, UNSPECIFIED ORGANISM: ICD-10-CM

## 2024-10-11 DIAGNOSIS — Z90.49 ACQUIRED ABSENCE OF OTHER SPECIFIED PARTS OF DIGESTIVE TRACT: Chronic | ICD-10-CM

## 2024-10-11 LAB
ALBUMIN SERPL ELPH-MCNC: 3.9 G/DL — SIGNIFICANT CHANGE UP (ref 3.3–5)
ALP SERPL-CCNC: 137 U/L — HIGH (ref 40–120)
ALT FLD-CCNC: 45 U/L — SIGNIFICANT CHANGE UP (ref 10–45)
ANION GAP SERPL CALC-SCNC: 15 MMOL/L — SIGNIFICANT CHANGE UP (ref 5–17)
AST SERPL-CCNC: 29 U/L — SIGNIFICANT CHANGE UP (ref 10–40)
BASOPHILS # BLD AUTO: 0.06 K/UL — SIGNIFICANT CHANGE UP (ref 0–0.2)
BASOPHILS NFR BLD AUTO: 0.3 % — SIGNIFICANT CHANGE UP (ref 0–2)
BILIRUB SERPL-MCNC: 0.6 MG/DL — SIGNIFICANT CHANGE UP (ref 0.2–1.2)
BUN SERPL-MCNC: 22 MG/DL — SIGNIFICANT CHANGE UP (ref 7–23)
CALCIUM SERPL-MCNC: 10 MG/DL — SIGNIFICANT CHANGE UP (ref 8.4–10.5)
CHLORIDE SERPL-SCNC: 97 MMOL/L — SIGNIFICANT CHANGE UP (ref 96–108)
CO2 SERPL-SCNC: 22 MMOL/L — SIGNIFICANT CHANGE UP (ref 22–31)
CREAT SERPL-MCNC: 0.88 MG/DL — SIGNIFICANT CHANGE UP (ref 0.5–1.3)
EGFR: 92 ML/MIN/1.73M2 — SIGNIFICANT CHANGE UP
EOSINOPHIL # BLD AUTO: 0 K/UL — SIGNIFICANT CHANGE UP (ref 0–0.5)
EOSINOPHIL NFR BLD AUTO: 0 % — SIGNIFICANT CHANGE UP (ref 0–6)
FLUAV AG NPH QL: SIGNIFICANT CHANGE UP
FLUBV AG NPH QL: SIGNIFICANT CHANGE UP
GAS PNL BLDV: SIGNIFICANT CHANGE UP
GLUCOSE SERPL-MCNC: 128 MG/DL — HIGH (ref 70–99)
HCT VFR BLD CALC: 43.3 % — SIGNIFICANT CHANGE UP (ref 39–50)
HGB BLD-MCNC: 14.5 G/DL — SIGNIFICANT CHANGE UP (ref 13–17)
IMM GRANULOCYTES NFR BLD AUTO: 0.6 % — SIGNIFICANT CHANGE UP (ref 0–0.9)
LYMPHOCYTES # BLD AUTO: 0.73 K/UL — LOW (ref 1–3.3)
LYMPHOCYTES # BLD AUTO: 4.2 % — LOW (ref 13–44)
MCHC RBC-ENTMCNC: 32.8 PG — SIGNIFICANT CHANGE UP (ref 27–34)
MCHC RBC-ENTMCNC: 33.5 GM/DL — SIGNIFICANT CHANGE UP (ref 32–36)
MCV RBC AUTO: 98 FL — SIGNIFICANT CHANGE UP (ref 80–100)
MONOCYTES # BLD AUTO: 1.49 K/UL — HIGH (ref 0–0.9)
MONOCYTES NFR BLD AUTO: 8.5 % — SIGNIFICANT CHANGE UP (ref 2–14)
NEUTROPHILS # BLD AUTO: 15.08 K/UL — HIGH (ref 1.8–7.4)
NEUTROPHILS NFR BLD AUTO: 86.4 % — HIGH (ref 43–77)
NRBC # BLD: 0 /100 WBCS — SIGNIFICANT CHANGE UP (ref 0–0)
PLATELET # BLD AUTO: 257 K/UL — SIGNIFICANT CHANGE UP (ref 150–400)
POTASSIUM SERPL-MCNC: 3.9 MMOL/L — SIGNIFICANT CHANGE UP (ref 3.5–5.3)
POTASSIUM SERPL-SCNC: 3.9 MMOL/L — SIGNIFICANT CHANGE UP (ref 3.5–5.3)
PROT SERPL-MCNC: 8.1 G/DL — SIGNIFICANT CHANGE UP (ref 6–8.3)
RBC # BLD: 4.42 M/UL — SIGNIFICANT CHANGE UP (ref 4.2–5.8)
RBC # FLD: 12 % — SIGNIFICANT CHANGE UP (ref 10.3–14.5)
RSV RNA NPH QL NAA+NON-PROBE: SIGNIFICANT CHANGE UP
SARS-COV-2 RNA SPEC QL NAA+PROBE: SIGNIFICANT CHANGE UP
SODIUM SERPL-SCNC: 134 MMOL/L — LOW (ref 135–145)
WBC # BLD: 17.46 K/UL — HIGH (ref 3.8–10.5)
WBC # FLD AUTO: 17.46 K/UL — HIGH (ref 3.8–10.5)

## 2024-10-11 PROCEDURE — 71046 X-RAY EXAM CHEST 2 VIEWS: CPT | Mod: 26

## 2024-10-11 PROCEDURE — 93010 ELECTROCARDIOGRAM REPORT: CPT

## 2024-10-11 PROCEDURE — 99285 EMERGENCY DEPT VISIT HI MDM: CPT

## 2024-10-11 RX ORDER — IPRATROPIUM BROMIDE AND ALBUTEROL SULFATE .5; 3 MG/3ML; MG/3ML
3 SOLUTION RESPIRATORY (INHALATION) ONCE
Refills: 0 | Status: DISCONTINUED | OUTPATIENT
Start: 2024-10-11 | End: 2024-10-12

## 2024-10-11 RX ORDER — CEFTRIAXONE SODIUM 1 G
1000 VIAL (EA) INJECTION ONCE
Refills: 0 | Status: COMPLETED | OUTPATIENT
Start: 2024-10-11 | End: 2024-10-11

## 2024-10-11 RX ORDER — INFLUENZA VIRUS VACCINE 15; 15; 15; 15 UG/.5ML; UG/.5ML; UG/.5ML; UG/.5ML
0.5 SUSPENSION INTRAMUSCULAR ONCE
Refills: 0 | Status: COMPLETED | OUTPATIENT
Start: 2024-10-11 | End: 2024-10-11

## 2024-10-11 RX ORDER — IPRATROPIUM BROMIDE AND ALBUTEROL SULFATE .5; 3 MG/3ML; MG/3ML
3 SOLUTION RESPIRATORY (INHALATION) ONCE
Refills: 0 | Status: COMPLETED | OUTPATIENT
Start: 2024-10-11 | End: 2024-10-12

## 2024-10-11 RX ORDER — ACETAMINOPHEN 325 MG
650 TABLET ORAL ONCE
Refills: 0 | Status: COMPLETED | OUTPATIENT
Start: 2024-10-11 | End: 2024-10-11

## 2024-10-11 RX ORDER — AZITHROMYCIN 250 MG/1
500 TABLET, FILM COATED ORAL ONCE
Refills: 0 | Status: COMPLETED | OUTPATIENT
Start: 2024-10-11 | End: 2024-10-11

## 2024-10-11 RX ADMIN — AZITHROMYCIN 255 MILLIGRAM(S): 250 TABLET, FILM COATED ORAL at 19:01

## 2024-10-11 RX ADMIN — Medication 100 MILLIGRAM(S): at 17:22

## 2024-10-11 NOTE — ED PROVIDER NOTE - CLINICAL SUMMARY MEDICAL DECISION MAKING FREE TEXT BOX
69 yr old hx of laryngeal cancer no immunosuppression presenting with cough, shortness of breath and hypoxia x 5 days. No chest pain. Suspect likely pneumonia vs viral pneumonitis. Will obtain CBC, CMP, CXR, viral swabs.   Amber Harding MD PGY-5 69 yr old hx of laryngeal cancer in remission presenting with cough, shortness of breath, fevers x 5 days found to be hypoxic to 88% at urgent care  today requiring supp O2. No chest pain. No LE pain/swelling. Lung exam with dec air entry BL. abnormal BS RLL. Scattered wheezes. Pt is a former smoker. Suspect likely pneumonia vs viral URI vs COPD exac. Lower suspicion for PE.  Will obtain labs, cultures, CXR, viral swab, trial nebs, abx prn, TBA given new O2 req.

## 2024-10-11 NOTE — ED PROVIDER NOTE - OBJECTIVE STATEMENT
69 yr old M hx of laryngeal cancer presenting with cough, fever, dyspnea x 5 days. Symptoms began on Monday, persisted throughout the week. Presented to urgent care earlier today, O2 sat 88, referred to ED. Tried taking inhaler x 1 for cough, minimal improvement. On exertion, with shortness of breath. Fatigue throughout week, improving today. Drinking fluids normally. No vomiting or loose stools. Fevers tactile, feels "warm" at night, did not take temp. No recent travel. + exposure to grandchildren in , no other known exposure.   PMH: laryngeal cancer s/p radiation, remission x 5 yrs, pneumonia 2 yrs ago  PSH: cataracts 1 month ago   No meds or allergies   Vaccinated 69 yr old M hx of laryngeal cancer presenting with cough, fever, dyspnea x 5 days. Symptoms began on Monday, persisted throughout the week. Presented to urgent care earlier today, O2 sat 88, referred to ED. Tried taking inhaler x 1 for cough, minimal improvement. On exertion, with shortness of breath. Fatigue throughout week, improving today. Drinking fluids normally. No vomiting or loose stools. Fevers tactile, feels "warm" at night, did not take temp. No recent travel. + exposure to grandchildren in , no other known exposure.   PMH: laryngeal cancer s/p radiation, remission x 5 yrs, pneumonia 2 yrs ago. Chest and neck CT one month ago showed emphysematous changes in lung tissue, RLL atelectasis    PSH: cataracts 1 month ago   No meds or allergies   Vaccinated

## 2024-10-11 NOTE — ED ADULT NURSE NOTE - OBJECTIVE STATEMENT
Pt is 70 y/o male, presenting to the ED via EMS c/o SOB. As per pt, was around his grandchildren last weekend. Pt reports that this week he has been having SOB w/ LOMAX. Pt reports symptoms feel similar to last time he has PNA. Pt went to  where he was rapid COVID /flu negative, but hypoxic to 88% on RA and febrile to 101. Pt sent to ED for further eval. Pt arrived and maintained on  3L NC. Upon assessment, pt AxOx3, sitting up in stretcher and speaking in full sentences. Breathing spontaneously and maintained on 3L NC. Abdomen soft and nontender to palpation. Pt moves all extremities w/ = strength. Skin is warm, dry, and intact w/ + peripheral pulses. Pt denies chest pain, n/v/d, dizziness, vision changes, numbness/tingling, and chills. Safety and comfort measures provided- bed in lowest position, locked, and blanket given.

## 2024-10-11 NOTE — ED PROVIDER NOTE - IV ALTEPLASE EXCL REL HIDDEN
Notes below from 6/15/20 appointment.    Will initiate trial of Jardianceat 10mg with step up to 20mg after 1 month.    show

## 2024-10-11 NOTE — ED PROVIDER NOTE - PROGRESS NOTE DETAILS
Vascular Attending:  Dr. Castellanos      HPI: 84yo female with h/o DM and HLD, PAD s/p previous RLE bypass and 1st toe amputation and more recently LLE SFA to DP bypass with RSVG and left 1st toe ray amputation on 2/18/19 presents from NH complaining of left foot "soreness/pain" x 1 week. Patient states she noticed her left foot bothering her when ambulating with a walker. The pain is on and off and more of "cramping" in quality. States she had a follow up appointment next month but her daughter has cancelled it because she was doing well. Denies rest pain, numbness/tingling, weakness.      PAST MEDICAL & SURGICAL HISTORY:  MRSA infection: right great toe ( amputated)  Glaucoma: b/l  Falls  Depression  Pulmonary embolism  DVT (deep venous thrombosis): Right lower extremity  MI (myocardial infarction): at age 63 y.o  Peripheral vascular disease  Ulcer of lower limb  Arthropathy  Ischemic heart disease  Hypertension  Hyperlipidemia  Diabetes mellitus: II  S/P amputation: may 2018; right great toe  History of surgery: x7 coronary artery stents  History of surgery: right leg bypass 5/2018 with amputation of right great toe    Allergies    Goldbond (Unknown)  penicillins (Anaphylaxis)    Intolerances        SOCIAL HISTORY:    FAMILY HISTORY:  No pertinent family history in first degree relatives      Vital Signs Last 24 Hrs  T(C): 36.7 (19 Jul 2019 03:23), Max: 36.7 (19 Jul 2019 03:23)  T(F): 98.1 (19 Jul 2019 03:23), Max: 98.1 (19 Jul 2019 03:23)  HR: 71 (19 Jul 2019 03:23) (71 - 83)  BP: 127/75 (19 Jul 2019 03:23) (127/75 - 155/79)  BP(mean): --  RR: 18 (19 Jul 2019 03:23) (18 - 18)  SpO2: 99% (19 Jul 2019 03:23) (99% - 100%)      PHYSICAL EXAM:    Constitutional: AAOx3, NAD    Respiratory: no respiratory distress    Cardiovascular: RRR    Extremities: RLE: 1st toe amputation well healed. Foot warm, no edema, non tender. LLE: 1st toe amputation site mostly healed with scab and small area of granulating tissue, no drainage, no fluctuance, no surrounding erythema/warmth, calf soft and non tender, no edema. Motor/sensory intact.    Vascular: RLE: 2+FEM/2+POP/triphasic DP/PT. LLE: 2+FEM/triphasic POP/biphasic DP/PT        LABS:                        11.1   6.05  )-----------( 201      ( 19 Jul 2019 00:58 )             35.4     07-19    140  |  106  |  18  ----------------------------<  159<H>  see note   |  23  |  0.72    Ca    8.8      19 Jul 2019 03:01    TPro  7.1  /  Alb  3.8  /  TBili  0.3  /  DBili  x   /  AST  see note  /  ALT  see note  /  AlkPhos  see note  07-19
CXR with evidence of RLL pneumonia. Will give antibotics for CAP treatment. Given continued hypoxia, on 3L, will be admitted.   Amber Harding MD PGY-5

## 2024-10-11 NOTE — ED PROVIDER NOTE - PHYSICAL EXAMINATION
Const:  Alert and interactive  HEENT: Moist mucosa; Oropharynx clear; Neck supple  Lymph: L sided mobile lymphadenopathy. nontender, without erythema   CV: Heart regular, normal S1/2, no murmurs; Extremities WWPx4  Pulm: Rhonchi over bilateral lower lung fields L>R, no accessory muscle use, NC in place  GI: Abdomen non-distended  Neuro: Alert; AxOx4

## 2024-10-12 DIAGNOSIS — R13.10 DYSPHAGIA, UNSPECIFIED: ICD-10-CM

## 2024-10-12 DIAGNOSIS — Z29.9 ENCOUNTER FOR PROPHYLACTIC MEASURES, UNSPECIFIED: ICD-10-CM

## 2024-10-12 DIAGNOSIS — Z98.49 CATARACT EXTRACTION STATUS, UNSPECIFIED EYE: Chronic | ICD-10-CM

## 2024-10-12 DIAGNOSIS — Z86.69 PERSONAL HISTORY OF OTHER DISEASES OF THE NERVOUS SYSTEM AND SENSE ORGANS: ICD-10-CM

## 2024-10-12 DIAGNOSIS — J18.9 PNEUMONIA, UNSPECIFIED ORGANISM: ICD-10-CM

## 2024-10-12 LAB
ANION GAP SERPL CALC-SCNC: 13 MMOL/L — SIGNIFICANT CHANGE UP (ref 5–17)
BUN SERPL-MCNC: 19 MG/DL — SIGNIFICANT CHANGE UP (ref 7–23)
CALCIUM SERPL-MCNC: 9.5 MG/DL — SIGNIFICANT CHANGE UP (ref 8.4–10.5)
CHLORIDE SERPL-SCNC: 98 MMOL/L — SIGNIFICANT CHANGE UP (ref 96–108)
CO2 SERPL-SCNC: 23 MMOL/L — SIGNIFICANT CHANGE UP (ref 22–31)
CREAT SERPL-MCNC: 0.71 MG/DL — SIGNIFICANT CHANGE UP (ref 0.5–1.3)
EGFR: 98 ML/MIN/1.73M2 — SIGNIFICANT CHANGE UP
GLUCOSE SERPL-MCNC: 136 MG/DL — HIGH (ref 70–99)
HCT VFR BLD CALC: 39.5 % — SIGNIFICANT CHANGE UP (ref 39–50)
HGB BLD-MCNC: 13 G/DL — SIGNIFICANT CHANGE UP (ref 13–17)
LEGIONELLA AG UR QL: NEGATIVE — SIGNIFICANT CHANGE UP
MAGNESIUM SERPL-MCNC: 2 MG/DL — SIGNIFICANT CHANGE UP (ref 1.6–2.6)
MCHC RBC-ENTMCNC: 32.2 PG — SIGNIFICANT CHANGE UP (ref 27–34)
MCHC RBC-ENTMCNC: 32.9 GM/DL — SIGNIFICANT CHANGE UP (ref 32–36)
MCV RBC AUTO: 97.8 FL — SIGNIFICANT CHANGE UP (ref 80–100)
MRSA PCR RESULT.: SIGNIFICANT CHANGE UP
NRBC # BLD: 0 /100 WBCS — SIGNIFICANT CHANGE UP (ref 0–0)
PHOSPHATE SERPL-MCNC: 2.7 MG/DL — SIGNIFICANT CHANGE UP (ref 2.5–4.5)
PLATELET # BLD AUTO: 233 K/UL — SIGNIFICANT CHANGE UP (ref 150–400)
POTASSIUM SERPL-MCNC: 3.8 MMOL/L — SIGNIFICANT CHANGE UP (ref 3.5–5.3)
POTASSIUM SERPL-SCNC: 3.8 MMOL/L — SIGNIFICANT CHANGE UP (ref 3.5–5.3)
RBC # BLD: 4.04 M/UL — LOW (ref 4.2–5.8)
RBC # FLD: 12.3 % — SIGNIFICANT CHANGE UP (ref 10.3–14.5)
S AUREUS DNA NOSE QL NAA+PROBE: SIGNIFICANT CHANGE UP
SODIUM SERPL-SCNC: 134 MMOL/L — LOW (ref 135–145)
WBC # BLD: 13.38 K/UL — HIGH (ref 3.8–10.5)
WBC # FLD AUTO: 13.38 K/UL — HIGH (ref 3.8–10.5)

## 2024-10-12 PROCEDURE — 99223 1ST HOSP IP/OBS HIGH 75: CPT

## 2024-10-12 RX ORDER — ONDANSETRON HCL/PF 4 MG/2 ML
4 VIAL (ML) INJECTION EVERY 8 HOURS
Refills: 0 | Status: DISCONTINUED | OUTPATIENT
Start: 2024-10-12 | End: 2024-10-15

## 2024-10-12 RX ORDER — PREDNISOLONE SODIUM PHOSPHATE 1 %
0 DROPS OPHTHALMIC (EYE)
Qty: 0 | Refills: 1 | DISCHARGE

## 2024-10-12 RX ORDER — KETOROLAC TROMETHAMINE 10 MG/1
30 TABLET, FILM COATED ORAL ONCE
Refills: 0 | Status: DISCONTINUED | OUTPATIENT
Start: 2024-10-12 | End: 2024-10-12

## 2024-10-12 RX ORDER — MAG HYDROX/ALUMINUM HYD/SIMETH 200-200-20
30 SUSPENSION, ORAL (FINAL DOSE FORM) ORAL EVERY 4 HOURS
Refills: 0 | Status: DISCONTINUED | OUTPATIENT
Start: 2024-10-12 | End: 2024-10-15

## 2024-10-12 RX ORDER — 5-HYDROXYTRYPTOPHAN (5-HTP) 100 MG
3 TABLET,DISINTEGRATING ORAL AT BEDTIME
Refills: 0 | Status: DISCONTINUED | OUTPATIENT
Start: 2024-10-12 | End: 2024-10-15

## 2024-10-12 RX ORDER — ACETAMINOPHEN 325 MG
650 TABLET ORAL EVERY 6 HOURS
Refills: 0 | Status: DISCONTINUED | OUTPATIENT
Start: 2024-10-12 | End: 2024-10-15

## 2024-10-12 RX ORDER — AZITHROMYCIN 250 MG/1
500 TABLET, FILM COATED ORAL EVERY 24 HOURS
Refills: 0 | Status: DISCONTINUED | OUTPATIENT
Start: 2024-10-12 | End: 2024-10-14

## 2024-10-12 RX ORDER — NYSTATIN 100000 U/G
1 POWDER TOPICAL
Refills: 0 | Status: DISCONTINUED | OUTPATIENT
Start: 2024-10-12 | End: 2024-10-15

## 2024-10-12 RX ORDER — ENOXAPARIN SODIUM 150 MG/ML
40 INJECTION SUBCUTANEOUS EVERY 24 HOURS
Refills: 0 | Status: DISCONTINUED | OUTPATIENT
Start: 2024-10-12 | End: 2024-10-15

## 2024-10-12 RX ORDER — CEFTRIAXONE SODIUM 1 G
1000 VIAL (EA) INJECTION EVERY 24 HOURS
Refills: 0 | Status: DISCONTINUED | OUTPATIENT
Start: 2024-10-12 | End: 2024-10-15

## 2024-10-12 RX ADMIN — IPRATROPIUM BROMIDE AND ALBUTEROL SULFATE 3 MILLILITER(S): .5; 3 SOLUTION RESPIRATORY (INHALATION) at 05:54

## 2024-10-12 RX ADMIN — NYSTATIN 1 APPLICATION(S): 100000 POWDER TOPICAL at 17:47

## 2024-10-12 RX ADMIN — ENOXAPARIN SODIUM 40 MILLIGRAM(S): 150 INJECTION SUBCUTANEOUS at 05:53

## 2024-10-12 RX ADMIN — KETOROLAC TROMETHAMINE 30 MILLIGRAM(S): 10 TABLET, FILM COATED ORAL at 19:02

## 2024-10-12 RX ADMIN — NYSTATIN 1 APPLICATION(S): 100000 POWDER TOPICAL at 05:54

## 2024-10-12 RX ADMIN — Medication 3 MILLIGRAM(S): at 02:05

## 2024-10-12 RX ADMIN — AZITHROMYCIN 500 MILLIGRAM(S): 250 TABLET, FILM COATED ORAL at 17:25

## 2024-10-12 RX ADMIN — Medication 0.5 MILLIGRAM(S): at 21:45

## 2024-10-12 RX ADMIN — Medication 100 MILLIGRAM(S): at 17:26

## 2024-10-12 NOTE — PROGRESS NOTE ADULT - PROBLEM SELECTOR PLAN 1
- Met sepsis criteria with WBCs 17.56, RR 22, also almost febrile w/ tmax 100.3F   - CXR (10/11): new hazy RLL opacity c/f PNA   - Given location of pna, c/f aspiration.     PLAN:  - c/w Azithromycin and Ceftriaxone (10/12-  - Supplemental O2 as needed, currently on 2L NC   - f/u infx work-up: BCx, sputum Cx, urine Legionella + Strep pneumo   - Tylenol 650 q6 PRN for fever  - Monitor fever curve, WBCs   - Aspiration precautions - Met sepsis criteria with WBCs 17.56, RR 22, also almost febrile w/ tmax 100.3F   - Negative COVID/flu/RSV  - CXR (10/11): new hazy RLL opacity c/f PNA   - Given location of pna, c/f aspiration.     PLAN:  - c/w Azithromycin and Ceftriaxone (10/12-  - Supplemental O2 as needed, currently on 2L NC   - f/u infx work-up: BCx, sputum Cx, urine Legionella + Strep pneumo   - Tylenol 650 q6 PRN for fever  - Monitor fever curve, WBCs   - Aspiration precautions - Met sepsis criteria with WBCs 17.56, RR 22, also almost febrile w/ tmax 100.3F   - Negative COVID/flu/RSV  - CXR (10/11): new hazy RLL opacity c/f PNA   - Given location of PNA, c/f aspiration    PLAN:  - c/w Azithromycin and Ceftriaxone (10/12-  - c/w supplemental O2, currently on 1L NC  - Wean supplemental O2 as tolerated   - f/u infx work-up: BCx, sputum Cx, urine Legionella + Strep pneumo   - Tylenol 650 q6 PRN for fever  - Monitor fever curve, WBCs   - Aspiration precautions - Met sepsis criteria with WBCs 17.56, RR 22, also almost febrile w/ tmax 100.3F   - Negative COVID/flu/RSV  - CXR (10/11): new hazy RLL opacity c/f PNA   - Given location of PNA, concern for aspiration though more likely CAP    PLAN:  - c/w Azithromycin and Ceftriaxone (10/12-  - c/w supplemental O2, currently on 1L NC  - Wean supplemental O2 as tolerated   - f/u infx work-up: BCx, sputum Cx, urine Legionella and Strep pneumo   - Tylenol 650 q6 PRN for fever  - Monitor fever curve, WBCs   - Aspiration precautions

## 2024-10-12 NOTE — PROGRESS NOTE ADULT - PROBLEM SELECTOR PLAN 2
- Pt w/ Hx laryngeal cancer s/p radiation to neck, now had dysphagia due to radiation changes  - Pt reports he had swallow eval study previously and recommended to take small bites and drink w/ straw    PLAN:  - Easy-to-chew and thin liquids, pt to use straw  - f/u speech eval  - Aspiration precautions

## 2024-10-12 NOTE — H&P ADULT - NSHPLABSRESULTS_GEN_ALL_CORE
14.5   17.46 )-----------( 257      ( 11 Oct 2024 16:33 )             43.3       10-11    134[L]  |  97  |  22  ----------------------------<  128[H]  3.9   |  22  |  0.88    Ca    10.0      11 Oct 2024 16:33    TPro  8.1  /  Alb  3.9  /  TBili  0.6  /  DBili  x   /  AST  29  /  ALT  45  /  AlkPhos  137[H]  10-11              Urinalysis Basic - ( 11 Oct 2024 16:33 )    Color: x / Appearance: x / SG: x / pH: x  Gluc: 128 mg/dL / Ketone: x  / Bili: x / Urobili: x   Blood: x / Protein: x / Nitrite: x   Leuk Esterase: x / RBC: x / WBC x   Sq Epi: x / Non Sq Epi: x / Bacteria: x                  CAPILLARY BLOOD GLUCOSE

## 2024-10-12 NOTE — H&P ADULT - NSHPPHYSICALEXAM_GEN_ALL_CORE
Vital Signs Last 24 Hrs  T(C): 37 (11 Oct 2024 23:18), Max: 37.9 (11 Oct 2024 14:56)  T(F): 98.6 (11 Oct 2024 23:18), Max: 100.3 (11 Oct 2024 14:56)  HR: 100 (11 Oct 2024 23:18) (89 - 109)  BP: 146/85 (11 Oct 2024 23:18) (131/84 - 146/85)  BP(mean): 98 (11 Oct 2024 16:05) (98 - 98)  RR: 18 (11 Oct 2024 23:18) (18 - 22)  SpO2: 94% (11 Oct 2024 23:18) (93% - 94%)    Parameters below as of 11 Oct 2024 23:18  Patient On (Oxygen Delivery Method): nasal cannula  O2 Flow (L/min): 2      CONSTITUTIONAL: Well-groomed, in no apparent distress  EYES: No conjunctival or scleral injection, non-icteric  ENMT: No external nasal lesions; MMM  RESPIRATORY: wearing 2LNC, diminished bs on RLL  CARDIOVASCULAR: +S1S2, RRR; no lower extremity edema  GASTROINTESTINAL: No tenderness, +BS throughout, no rebound/guarding  NEUROLOGIC: No gross focal neurological deficits, AAOX3  PSYCHIATRIC: mood and affect appropriate; appropriate insight and judgment

## 2024-10-12 NOTE — PROGRESS NOTE ADULT - PROBLEM SELECTOR PLAN 4
- VTE ppx = Lovenox    - Diet = easy-to-chew, thin liquids (f/u speech eval, reccs)  - Dispo = Pending - VTE ppx = Lovenox    - Diet = easy-to-chew, thin liquids (f/u speech eval, reccs)  - Dispo = Pending wean off O2, anticipate DC in 1-2 days

## 2024-10-12 NOTE — PROGRESS NOTE ADULT - SUBJECTIVE AND OBJECTIVE BOX
Progress Note    ==========Authored by:==========  Jessica Adam MD   PGY-1 Internal Medicine  Available on [employer] TEAMS    10-11-24 (1d)    Patient is a 69y old  Male who presents with a chief complaint of     Interval/Overnight Events: KURT.     Subjective: Patient seen and examined at bedside. Afebrile, VSS, on 2L NC.       MEDICATIONS  (STANDING):  azithromycin   Tablet 500 milliGRAM(s) Oral every 24 hours  cefTRIAXone   IVPB 1000 milliGRAM(s) IV Intermittent every 24 hours  enoxaparin Injectable 40 milliGRAM(s) SubCutaneous every 24 hours  influenza  Vaccine (HIGH DOSE) 0.5 milliLiter(s) IntraMuscular once  nystatin Powder 1 Application(s) Topical two times a day    MEDICATIONS  (PRN):  acetaminophen     Tablet .. 650 milliGRAM(s) Oral every 6 hours PRN Temp greater or equal to 38C (100.4F), Mild Pain (1 - 3)  aluminum hydroxide/magnesium hydroxide/simethicone Suspension 30 milliLiter(s) Oral every 4 hours PRN Dyspepsia  melatonin 3 milliGRAM(s) Oral at bedtime PRN Insomnia  ondansetron Injectable 4 milliGRAM(s) IV Push every 8 hours PRN Nausea and/or Vomiting          PHYSICAL EXAM:  Vital Signs Last 24 Hrs  T(C): 37.6 (12 Oct 2024 05:03), Max: 37.9 (11 Oct 2024 14:56)  T(F): 99.6 (12 Oct 2024 05:03), Max: 100.3 (11 Oct 2024 14:56)  HR: 92 (12 Oct 2024 05:03) (89 - 109)  BP: 139/87 (12 Oct 2024 05:03) (131/84 - 146/85)  BP(mean): 98 (11 Oct 2024 16:05) (98 - 98)  RR: 18 (12 Oct 2024 05:03) (18 - 22)  SpO2: 94% (12 Oct 2024 05:03) (93% - 94%)    Parameters below as of 12 Oct 2024 05:03  Patient On (Oxygen Delivery Method): nasal cannula        I&O's Summary        PHYSICAL EXAM    GENERAL: NAD, non-toxic appearing  EYES: EOMI, PERRLA, conjunctiva and sclera clear  ENT: moist mucous membranes  CV: RRR, normal S1 and S2. No mumurs, rubs, or gallops. No JVD.   LUNGS: Clear to auscultation bilaterally. No rales, rhonchi, or wheezing.   ABDOMEN: Soft, nontender, nondistended, +bowel sounds   NEURO: AOx4, no FND  PSYCH: Appropriate affect  EXTREMITIES: No edema, cyanosis, or clubbing. 2+ peripheral pulses.  LYMPH: No lymphadenopathy noted  SKIN: No rashes or lesions        LABS:  CAPILLARY BLOOD GLUCOSE                                  14.5   17.46 )-----------( 257      ( 11 Oct 2024 16:33 )             43.3       WBC Trend: 17.46<--  Hb Trend: 14.5<--    10-11    134[L]  |  97  |  22  ----------------------------<  128[H]  3.9   |  22  |  0.88    Ca    10.0      11 Oct 2024 16:33    TPro  8.1  /  Alb  3.9  /  TBili  0.6  /  DBili  x   /  AST  29  /  ALT  45  /  AlkPhos  137[H]  10-11          Urinalysis Basic - ( 11 Oct 2024 16:33 )    Color: x / Appearance: x / SG: x / pH: x  Gluc: 128 mg/dL / Ketone: x  / Bili: x / Urobili: x   Blood: x / Protein: x / Nitrite: x   Leuk Esterase: x / RBC: x / WBC x   Sq Epi: x / Non Sq Epi: x / Bacteria: x            RADIOLOGY & ADDITIONAL TESTS: Reviewed   Progress Note    ==========Authored by:==========  Jessica Adam MD   PGY-1 Internal Medicine  Available on [employer] TEAMS    10-11-24 (1d)    Patient is a 69y old  Male who presents with a chief complaint of     Interval/Overnight Events: KURT.     Subjective: Patient seen and examined at bedside. Afebrile, VSS, on 1L NC. Downtrending leukocytosis. Feeling a bit better this morning. Continues to endorse productive cough w/ yellow-green sputum. Still w/ LOMAX, pleuritic CP and CP w/ coughing fits. Denies fever, chills, SOB, orthopnea, PND, abdominal pain, N/V/D.       MEDICATIONS  (STANDING):  azithromycin   Tablet 500 milliGRAM(s) Oral every 24 hours  cefTRIAXone   IVPB 1000 milliGRAM(s) IV Intermittent every 24 hours  enoxaparin Injectable 40 milliGRAM(s) SubCutaneous every 24 hours  influenza  Vaccine (HIGH DOSE) 0.5 milliLiter(s) IntraMuscular once  nystatin Powder 1 Application(s) Topical two times a day    MEDICATIONS  (PRN):  acetaminophen     Tablet .. 650 milliGRAM(s) Oral every 6 hours PRN Temp greater or equal to 38C (100.4F), Mild Pain (1 - 3)  aluminum hydroxide/magnesium hydroxide/simethicone Suspension 30 milliLiter(s) Oral every 4 hours PRN Dyspepsia  melatonin 3 milliGRAM(s) Oral at bedtime PRN Insomnia  ondansetron Injectable 4 milliGRAM(s) IV Push every 8 hours PRN Nausea and/or Vomiting          PHYSICAL EXAM:  Vital Signs Last 24 Hrs  T(C): 37.6 (12 Oct 2024 05:03), Max: 37.9 (11 Oct 2024 14:56)  T(F): 99.6 (12 Oct 2024 05:03), Max: 100.3 (11 Oct 2024 14:56)  HR: 92 (12 Oct 2024 05:03) (89 - 109)  BP: 139/87 (12 Oct 2024 05:03) (131/84 - 146/85)  BP(mean): 98 (11 Oct 2024 16:05) (98 - 98)  RR: 18 (12 Oct 2024 05:03) (18 - 22)  SpO2: 94% (12 Oct 2024 05:03) (93% - 94%)    Parameters below as of 12 Oct 2024 05:03  Patient On (Oxygen Delivery Method): nasal cannula        I&O's Summary        PHYSICAL EXAM    GENERAL: NAD, non-toxic appearing  EYES: EOMI, PERRLA, conjunctiva and sclera clear  ENT: moist mucous membranes  CV: RRR, normal S1 and S2. No murmurs, rubs, or gallops. No JVD.   LUNGS: crackles over RLL. No rhonchi or wheezing.   ABDOMEN: Soft, nontender, nondistended, +bowel sounds   NEURO: AOx4, no FND  PSYCH: Appropriate affect  EXTREMITIES: No edema, cyanosis, or clubbing. 2+ peripheral pulses.  LYMPH: No lymphadenopathy noted  SKIN: No rashes or lesions        LABS:  CAPILLARY BLOOD GLUCOSE                                  14.5   17.46 )-----------( 257      ( 11 Oct 2024 16:33 )             43.3       WBC Trend: 17.46<--  Hb Trend: 14.5<--    10-11    134[L]  |  97  |  22  ----------------------------<  128[H]  3.9   |  22  |  0.88    Ca    10.0      11 Oct 2024 16:33    TPro  8.1  /  Alb  3.9  /  TBili  0.6  /  DBili  x   /  AST  29  /  ALT  45  /  AlkPhos  137[H]  10-11          Urinalysis Basic - ( 11 Oct 2024 16:33 )    Color: x / Appearance: x / SG: x / pH: x  Gluc: 128 mg/dL / Ketone: x  / Bili: x / Urobili: x   Blood: x / Protein: x / Nitrite: x   Leuk Esterase: x / RBC: x / WBC x   Sq Epi: x / Non Sq Epi: x / Bacteria: x            RADIOLOGY & ADDITIONAL TESTS: Reviewed   Progress Note    ==========Authored by:==========  Jessica Adam MD   PGY-1 Internal Medicine  Available on [employer] TEAMS    10-11-24 (1d)    Patient is a 69y old  Male who presents with a chief complaint of     Interval/Overnight Events: KURT.     Subjective: Patient seen and examined at bedside. Afebrile, VSS, on 1L NC. Downtrending leukocytosis. Feeling a bit better this morning. Continues to endorse productive cough w/ yellow-green sputum. Still w/ LOMAX, pleuritic CP and CP w/ coughing fits. Denies fever, chills, SOB, orthopnea, PND, abdominal pain, N/V/D.       MEDICATIONS  (STANDING):  azithromycin   Tablet 500 milliGRAM(s) Oral every 24 hours  cefTRIAXone   IVPB 1000 milliGRAM(s) IV Intermittent every 24 hours  enoxaparin Injectable 40 milliGRAM(s) SubCutaneous every 24 hours  influenza  Vaccine (HIGH DOSE) 0.5 milliLiter(s) IntraMuscular once  nystatin Powder 1 Application(s) Topical two times a day    MEDICATIONS  (PRN):  acetaminophen     Tablet .. 650 milliGRAM(s) Oral every 6 hours PRN Temp greater or equal to 38C (100.4F), Mild Pain (1 - 3)  aluminum hydroxide/magnesium hydroxide/simethicone Suspension 30 milliLiter(s) Oral every 4 hours PRN Dyspepsia  melatonin 3 milliGRAM(s) Oral at bedtime PRN Insomnia  ondansetron Injectable 4 milliGRAM(s) IV Push every 8 hours PRN Nausea and/or Vomiting          PHYSICAL EXAM:  Vital Signs Last 24 Hrs  T(C): 37.6 (12 Oct 2024 05:03), Max: 37.9 (11 Oct 2024 14:56)  T(F): 99.6 (12 Oct 2024 05:03), Max: 100.3 (11 Oct 2024 14:56)  HR: 92 (12 Oct 2024 05:03) (89 - 109)  BP: 139/87 (12 Oct 2024 05:03) (131/84 - 146/85)  BP(mean): 98 (11 Oct 2024 16:05) (98 - 98)  RR: 18 (12 Oct 2024 05:03) (18 - 22)  SpO2: 94% (12 Oct 2024 05:03) (93% - 94%)    Parameters below as of 12 Oct 2024 05:03  Patient On (Oxygen Delivery Method): nasal cannula        I&O's Summary        PHYSICAL EXAM    GENERAL: NAD, non-toxic appearing, breathing comfortably w/ NC  EYES: EOMI, PERRLA, conjunctiva and sclera clear  ENT: moist mucous membranes  CV: RRR, normal S1 and S2. No murmurs, rubs, or gallops. No JVD.   LUNGS: crackles over RLL. No rhonchi or wheezing.   ABDOMEN: Soft, nontender, nondistended, +bowel sounds   NEURO: AOx4, no FND  PSYCH: Appropriate affect  EXTREMITIES: No edema, cyanosis, or clubbing. 2+ peripheral pulses.  LYMPH: No lymphadenopathy noted  SKIN: No rashes or lesions        LABS:  CAPILLARY BLOOD GLUCOSE                                  14.5   17.46 )-----------( 257      ( 11 Oct 2024 16:33 )             43.3       WBC Trend: 17.46<--  Hb Trend: 14.5<--    10-11    134[L]  |  97  |  22  ----------------------------<  128[H]  3.9   |  22  |  0.88    Ca    10.0      11 Oct 2024 16:33    TPro  8.1  /  Alb  3.9  /  TBili  0.6  /  DBili  x   /  AST  29  /  ALT  45  /  AlkPhos  137[H]  10-11          Urinalysis Basic - ( 11 Oct 2024 16:33 )    Color: x / Appearance: x / SG: x / pH: x  Gluc: 128 mg/dL / Ketone: x  / Bili: x / Urobili: x   Blood: x / Protein: x / Nitrite: x   Leuk Esterase: x / RBC: x / WBC x   Sq Epi: x / Non Sq Epi: x / Bacteria: x            RADIOLOGY & ADDITIONAL TESTS: Reviewed

## 2024-10-12 NOTE — H&P ADULT - HISTORY OF PRESENT ILLNESS
69M PMHx laryngeal ca s/p radiation in remission, copd (on documentation), maritza not on cpap (on documentation).   Presenting w/ subjective fever, cough, dyspnea x5d. Went to  today, found w/ o2 sat 88% and sent to ED.      In the ED, Tmax 100.3F, HRmax 109, BP stable, RRmax 22, on 3LNC saturating 93-94%.     CBC w/ wbc 17.46, hgb 14.5, plt 257.   CMP w/ Na 134, SCr 0.88, alk phos 137, ast and alt wnl.   VBG w/ lactate 1.4.   covid/rsv/flu neg.   CXR w/ new opacity RLL, c/f pna.     On my exam/interview, pt aaox3 and comfortable at rest with 2LNC. Pt ambulated to and from bathroom without oxygen and c/o dyspnea and pleuritic cp on right lower chest and o2 sat 84%.

## 2024-10-12 NOTE — H&P ADULT - PROBLEM SELECTOR PLAN 2
-med rec with patient: reports he's only on eye drops which was rx'd by his ophthalmologist for the cataracts. Pt declined to have any eyedrops ordered while he's admitted. Discussed risks and benefits but pt is firm on his decision tonight re: not taking eye drops while admitted. Pt advised NOT to use his own eye drops while admitted. Pt expressed understanding.

## 2024-10-12 NOTE — PROGRESS NOTE ADULT - ASSESSMENT
70 yo M w/ PMHx laryngeal cancer s/p radiation, remission x5 years who presents w/ cough, fever, and dyspnea x5 days, found to have new RLL hazy opacity c/f PNA.  68 yo M w/ PMHx laryngeal cancer s/p radiation, remission x5 years who presents w/ cough, fever, and dyspnea x5 days, found to have new RLL hazy opacity adm with sepsis and acute hypoxic respiratory failure 2/2 CAP PNA.

## 2024-10-12 NOTE — H&P ADULT - PROBLEM SELECTOR PLAN 1
-met sepsis criteria with wbc and rr. also, almost febrile w/ tmax 100.3F   -given location of pna, c/f aspiration.   -c/w ctx and azithromycin   -f/u pna w/u   -pt with h/o dysphagia from radiation to neck. Pt reports he had swallow eval done before and recommended to take small bites and drink with straw.   -for now, will order easy to chew and thin liquid, pt to use straw.   -f/u speech eval.

## 2024-10-13 LAB
ANION GAP SERPL CALC-SCNC: 16 MMOL/L — SIGNIFICANT CHANGE UP (ref 5–17)
BUN SERPL-MCNC: 24 MG/DL — HIGH (ref 7–23)
CALCIUM SERPL-MCNC: 9.7 MG/DL — SIGNIFICANT CHANGE UP (ref 8.4–10.5)
CHLORIDE SERPL-SCNC: 98 MMOL/L — SIGNIFICANT CHANGE UP (ref 96–108)
CO2 SERPL-SCNC: 22 MMOL/L — SIGNIFICANT CHANGE UP (ref 22–31)
CREAT SERPL-MCNC: 0.7 MG/DL — SIGNIFICANT CHANGE UP (ref 0.5–1.3)
EGFR: 99 ML/MIN/1.73M2 — SIGNIFICANT CHANGE UP
GLUCOSE SERPL-MCNC: 115 MG/DL — HIGH (ref 70–99)
HCT VFR BLD CALC: 40.1 % — SIGNIFICANT CHANGE UP (ref 39–50)
HGB BLD-MCNC: 13.2 G/DL — SIGNIFICANT CHANGE UP (ref 13–17)
MAGNESIUM SERPL-MCNC: 2.2 MG/DL — SIGNIFICANT CHANGE UP (ref 1.6–2.6)
MCHC RBC-ENTMCNC: 32 PG — SIGNIFICANT CHANGE UP (ref 27–34)
MCHC RBC-ENTMCNC: 32.9 GM/DL — SIGNIFICANT CHANGE UP (ref 32–36)
MCV RBC AUTO: 97.3 FL — SIGNIFICANT CHANGE UP (ref 80–100)
NRBC # BLD: 0 /100 WBCS — SIGNIFICANT CHANGE UP (ref 0–0)
PHOSPHATE SERPL-MCNC: 3.5 MG/DL — SIGNIFICANT CHANGE UP (ref 2.5–4.5)
PLATELET # BLD AUTO: 255 K/UL — SIGNIFICANT CHANGE UP (ref 150–400)
POTASSIUM SERPL-MCNC: 3.9 MMOL/L — SIGNIFICANT CHANGE UP (ref 3.5–5.3)
POTASSIUM SERPL-SCNC: 3.9 MMOL/L — SIGNIFICANT CHANGE UP (ref 3.5–5.3)
RBC # BLD: 4.12 M/UL — LOW (ref 4.2–5.8)
RBC # FLD: 12.3 % — SIGNIFICANT CHANGE UP (ref 10.3–14.5)
S PNEUM AG UR QL: NEGATIVE — SIGNIFICANT CHANGE UP
SODIUM SERPL-SCNC: 136 MMOL/L — SIGNIFICANT CHANGE UP (ref 135–145)
WBC # BLD: 10.15 K/UL — SIGNIFICANT CHANGE UP (ref 3.8–10.5)
WBC # FLD AUTO: 10.15 K/UL — SIGNIFICANT CHANGE UP (ref 3.8–10.5)

## 2024-10-13 PROCEDURE — 99233 SBSQ HOSP IP/OBS HIGH 50: CPT | Mod: GC

## 2024-10-13 RX ORDER — ALBUTEROL 90 MCG
1 AEROSOL (GRAM) INHALATION EVERY 4 HOURS
Refills: 0 | Status: DISCONTINUED | OUTPATIENT
Start: 2024-10-13 | End: 2024-10-14

## 2024-10-13 RX ORDER — 5-HYDROXYTRYPTOPHAN (5-HTP) 100 MG
5 TABLET,DISINTEGRATING ORAL AT BEDTIME
Refills: 0 | Status: DISCONTINUED | OUTPATIENT
Start: 2024-10-13 | End: 2024-10-15

## 2024-10-13 RX ORDER — KETOROLAC TROMETHAMINE 10 MG/1
15 TABLET, FILM COATED ORAL ONCE
Refills: 0 | Status: DISCONTINUED | OUTPATIENT
Start: 2024-10-13 | End: 2024-10-15

## 2024-10-13 RX ORDER — FLUTICASONE PROPION/SALMETEROL 100-50 MCG
1 BLISTER, WITH INHALATION DEVICE INHALATION
Refills: 0 | Status: DISCONTINUED | OUTPATIENT
Start: 2024-10-13 | End: 2024-10-15

## 2024-10-13 RX ORDER — ALBUTEROL 90 MCG
2.5 AEROSOL (GRAM) INHALATION EVERY 6 HOURS
Refills: 0 | Status: DISCONTINUED | OUTPATIENT
Start: 2024-10-13 | End: 2024-10-14

## 2024-10-13 RX ADMIN — Medication 1 DOSE(S): at 17:34

## 2024-10-13 RX ADMIN — Medication 2.5 MILLIGRAM(S): at 17:41

## 2024-10-13 RX ADMIN — AZITHROMYCIN 500 MILLIGRAM(S): 250 TABLET, FILM COATED ORAL at 17:34

## 2024-10-13 RX ADMIN — Medication 5 MILLIGRAM(S): at 21:35

## 2024-10-13 RX ADMIN — Medication 100 MILLIGRAM(S): at 17:36

## 2024-10-13 RX ADMIN — Medication 2.5 MILLIGRAM(S): at 15:10

## 2024-10-13 RX ADMIN — ENOXAPARIN SODIUM 40 MILLIGRAM(S): 150 INJECTION SUBCUTANEOUS at 05:47

## 2024-10-13 RX ADMIN — NYSTATIN 1 APPLICATION(S): 100000 POWDER TOPICAL at 17:37

## 2024-10-13 NOTE — SWALLOW BEDSIDE ASSESSMENT ADULT - SWALLOW EVAL: DIAGNOSIS
69yoM w/ PMHx of laryngeal cancer s/p radiation (remission x5 years) p/w cough, fever, and dyspnea, CXR w/ RLL hazy opacity a/w sepsis and AHRF 2/2 PNA (c/f aspiration vs CAP). Pt p/w evidence of an oropharyngeal dysphagia likely acute on chronic. Oral phase notable for slightly prolonged mastication and oral transfer of regular solid. Pharyngeal phase notable for suspected decreased hyolaryngeal elevation upon palpation (w/ onset appearing slightly effortful, though pt denies discomfort/odynophagia) and slight audibility to swallows (which may be indicative of incoordination of swallow sequence). No overt s/s of laryngeal penetration/aspiration across trials offered. Delayed cough similar to baseline cough noted ~5 minutes s/p oral intake. Pt currently denies swallowing difficulty and consumes regular diet PTA. Given chest imaging findings, objective testing (MBS) warranted to r/o aspiration. Pt and pt's wife in agreement w/ plan.

## 2024-10-13 NOTE — SWALLOW BEDSIDE ASSESSMENT ADULT - COMMENTS
Per medicine note-->Pt w/ Hx laryngeal cancer s/p radiation to neck, now had dysphagia due to radiation changes. Pt reports he had swallow eval study previously and recommended to take small bites and drink w/ straw.    Imaging:  10/11: CXR: New hazy opacity in the right lower lung field, suspicious for pneumonia.    **Not previously known to this service. No prior reports in [facility] or PACS.

## 2024-10-13 NOTE — SWALLOW BEDSIDE ASSESSMENT ADULT - SWALLOW EVAL: PATIENT/FAMILY GOALS STATEMENT
Pt reports consuming regular diet PTA. Denies current swallowing difficulty. Reports hx of PNA ~2 years ago. Pt seen by SLP ~5 years ago when receiving radiation treatment. Per d/w pt, pt does not appear to be following any diet modifications at home.

## 2024-10-13 NOTE — SWALLOW BEDSIDE ASSESSMENT ADULT - SLP GENERAL OBSERVATIONS
Pt encountered in bed, awake, alert, on 2LNC, oriented x4. Able to make wants/needs known and follow commands. Speech intelligible. Voice slightly hoarse, c/w hx of laryngeal cancer. Baseline cough.

## 2024-10-13 NOTE — SWALLOW BEDSIDE ASSESSMENT ADULT - SLP PERTINENT HISTORY OF CURRENT PROBLEM
69M PMHx laryngeal ca s/p radiation in remission, copd (on documentation), maritza not on cpap (on documentation). Presenting w/ subjective fever, cough, dyspnea x5d. Went to  today, found w/ o2 sat 88% and sent to ED. In the ED, Tmax 100.3F, HRmax 109, BP stable, RRmax 22, on 3LNC saturating 93-94%. CBC w/ wbc 17.46, hgb 14.5, plt 257. CMP w/ Na 134, SCr 0.88, alk phos 137, ast and alt wnl. VBG w/ lactate 1.4. covid/rsv/flu neg. CXR w/ new opacity RLL, c/f pna. Given location of PNA, concern for aspiration though more likely CAP. Met sepsis criteria.

## 2024-10-13 NOTE — SWALLOW BEDSIDE ASSESSMENT ADULT - SWALLOW EVAL: CRITERIA FOR SKILLED INTERVENTION MET
Beth Gaviria, a  at 28w6d with an ABELARDO of 2022, by Ultrasound, was seen at [facility] LABOR DELIVERY for a nonstress test.    Chief Complaint   Patient presents with   • Abdominal Cramping     Sent from office for abd cramping,  twins, negative nitrazine in office, FFN sent from office.       Patient Active Problem List   Diagnosis   • Anxiety   • Depression   • H/O gestational diabetes mellitus, not currently pregnant   • Menorrhagia with regular cycle   • Personality disorder (HCC)   • Prediabetes   • Seasonal allergies   • Severe obesity (BMI 35.0-39.9) with comorbidity (HCC)   • Dichorionic diamniotic twin pregnancy in second trimester   • Previous  delivery, antepartum   • Pregnancy       Start Time: 150  Stop Time:     Interpretation A  Nonstress Test Interpretation A: Reactive (22 : Dasia Perea, RN)          
demonstrates skilled criteria for swallowing intervention

## 2024-10-13 NOTE — PROGRESS NOTE ADULT - TIME BILLING
review of labs, imaging, notes, discussion of plan with team, which are independent of time spent teaching
review of labs, imaging, notes, discussion of plan with team, which are independent of time spent teaching

## 2024-10-13 NOTE — SWALLOW BEDSIDE ASSESSMENT ADULT - ASPIRATION PRECAUTIONS
Monitor for s/s aspiration/laryngeal penetration. If noted:  D/C p.o. intake, provide non-oral nutrition/hydration/meds, and contact this service @ [email or web address] Have You Had Fillers Before?: has had fillers

## 2024-10-13 NOTE — SWALLOW BEDSIDE ASSESSMENT ADULT - PHARYNGEAL PHASE
Onset of pharyngeal swallow appears slightly effortful on occasion; Pt denies odynophagia./Decreased laryngeal elevation Slight audibility to swallows; Onset of pharyngeal swallow appears slightly effortful on occasion; Pt denies odynophagia./Decreased laryngeal elevation

## 2024-10-13 NOTE — PROGRESS NOTE ADULT - PROBLEM SELECTOR PLAN 1
- Met sepsis criteria with WBCs 17.56, RR 22, also almost febrile w/ tmax 100.3F   - Negative COVID/flu/RSV  - CXR (10/11): new hazy RLL opacity c/f PNA   - Given location of PNA, concern for aspiration though more likely CAP    PLAN:  - c/w Azithromycin and Ceftriaxone (10/12-  - c/w supplemental O2, currently on 1L NC  - Wean supplemental O2 as tolerated   - Patient may also have underlying CPOD due to smokinx hx, will start patietn on advair and albuterol q6  - f/u infx work-up: BCx, sputum Cx, urine Legionella and Strep pneumo   - Tylenol 650 q6 PRN for fever  - Monitor fever curve, WBCs   - Aspiration precautions - Met sepsis criteria with WBCs 17.56, RR 22, also almost febrile w/ tmax 100.3F   - Negative COVID/flu/RSV  - CXR (10/11): new hazy RLL opacity c/f PNA   - Given location of PNA, concern for aspiration though more likely CAP    PLAN:  - c/w Azithromycin and Ceftriaxone (10/12-  - c/w supplemental O2, will obtain ambulatory o2 sat  - Wean supplemental O2 as tolerated   - Patient may also have underlying CPOD due to smoking hx, will start patient on advair and albuterol q6  - f/u infx work-up: BCx, sputum Cx, urine Legionella and Strep pneumo   - Tylenol 650 q6 PRN for fever  - Monitor fever curve, WBCs   - Aspiration precautions - Met sepsis criteria with WBCs 17.56, RR 22, also almost febrile w/ tmax 100.3F   - Negative COVID/flu/RSV  - CXR (10/11): new hazy RLL opacity c/f PNA   - Given location of PNA, concern for aspiration though more likely CAP    PLAN:  - c/w Azithromycin and Ceftriaxone (10/12-  - c/w supplemental O2, will obtain ambulatory o2 sat  - Wean supplemental O2 as tolerated   - Patient may also have underlying CPOD due to smoking hx, will start patient on advair and albuterol q6  - f/u infx work-up: BCx ngtd  - Tylenol 650 q6 PRN for fever  - Monitor fever curve, WBCs   - Aspiration precautions

## 2024-10-13 NOTE — PROGRESS NOTE ADULT - ASSESSMENT
70 yo M w/ PMHx laryngeal cancer s/p radiation, remission x5 years who presents w/ cough, fever, and dyspnea x5 days, found to have new RLL hazy opacity adm with sepsis and acute hypoxic respiratory failure 2/2 CAP PNA.

## 2024-10-13 NOTE — PROGRESS NOTE ADULT - PROBLEM SELECTOR PLAN 4
- VTE ppx = Lovenox    - Diet = easy-to-chew, thin liquids (f/u speech eval, reccs)  - Dispo = Pending wean off O2, anticipate DC in 1-2 days

## 2024-10-13 NOTE — PROGRESS NOTE ADULT - SUBJECTIVE AND OBJECTIVE BOX
Monae Peter PGY1  pager [phone] or check resident tab for coverage    Patient is a 69y old  Male who presents with a chief complaint of pneumonia (12 Oct 2024 07:41)      SUBJECTIVE / OVERNIGHT EVENTS:    MEDICATIONS  (STANDING):  azithromycin   Tablet 500 milliGRAM(s) Oral every 24 hours  cefTRIAXone   IVPB 1000 milliGRAM(s) IV Intermittent every 24 hours  enoxaparin Injectable 40 milliGRAM(s) SubCutaneous every 24 hours  influenza  Vaccine (HIGH DOSE) 0.5 milliLiter(s) IntraMuscular once  nystatin Powder 1 Application(s) Topical two times a day    MEDICATIONS  (PRN):  acetaminophen     Tablet .. 650 milliGRAM(s) Oral every 6 hours PRN Temp greater or equal to 38C (100.4F), Mild Pain (1 - 3)  aluminum hydroxide/magnesium hydroxide/simethicone Suspension 30 milliLiter(s) Oral every 4 hours PRN Dyspepsia  melatonin 3 milliGRAM(s) Oral at bedtime PRN Insomnia  ondansetron Injectable 4 milliGRAM(s) IV Push every 8 hours PRN Nausea and/or Vomiting      CAPILLARY BLOOD GLUCOSE        I&O's Summary    12 Oct 2024 07:01  -  13 Oct 2024 07:00  --------------------------------------------------------  IN: 300 mL / OUT: 300 mL / NET: 0 mL    13 Oct 2024 07:01  -  13 Oct 2024 10:44  --------------------------------------------------------  IN: 240 mL / OUT: 0 mL / NET: 240 mL        Vital Signs Last 24 Hrs  T(C): 36.8 (13 Oct 2024 04:00), Max: 37.4 (12 Oct 2024 15:08)  T(F): 98.3 (13 Oct 2024 04:00), Max: 99.3 (12 Oct 2024 15:08)  HR: 100 (13 Oct 2024 04:00) (72 - 100)  BP: 165/91 (13 Oct 2024 04:00) (121/77 - 165/91)  BP(mean): --  RR: 18 (13 Oct 2024 04:00) (18 - 18)  SpO2: 96% (13 Oct 2024 04:00) (91% - 96%)    Parameters below as of 13 Oct 2024 04:00  Patient On (Oxygen Delivery Method): nasal cannula        PHYSICAL EXAM:  GENERAL: no distress  PSYCH: A&O x3  HEAD: Atraumatic, Normocephalic  NECK: Supple, No JVD  CHEST/LUNG: clear to auscultation bilaterally  HEART: regular rate and rhythm, no murmurs  ABDOMEN: nontender to palpation, no rebound tenderness/guarding  EXTREMITIES: no edema on bilateral LE  NEUROLOGY: no focal neurologic deficit  SKIN: No rashes or lesions    LABS:                        13.2   10.15 )-----------( 255      ( 13 Oct 2024 06:20 )             40.1      10-13    136  |  98  |  24[H]  ----------------------------<  115[H]  3.9   |  22  |  0.70    Ca    9.7      13 Oct 2024 06:20  Phos  3.5     10-13  Mg     2.2     10-13    TPro  8.1  /  Alb  3.9  /  TBili  0.6  /  DBili  x   /  AST  29  /  ALT  45  /  AlkPhos  137[H]  10-11          Urinalysis Basic - ( 13 Oct 2024 06:20 )    Color: x / Appearance: x / SG: x / pH: x  Gluc: 115 mg/dL / Ketone: x  / Bili: x / Urobili: x   Blood: x / Protein: x / Nitrite: x   Leuk Esterase: x / RBC: x / WBC x   Sq Epi: x / Non Sq Epi: x / Bacteria: x        RADIOLOGY & ADDITIONAL TESTS:    Imaging Personally Reviewed:    Consultant(s) Notes Reviewed:      Care Discussed with Consultants/Other Providers:   Monae Peter PGY3  pager [phone] or check resident tab for coverage    Patient is a 69y old  Male who presents with a chief complaint of pneumonia (12 Oct 2024 07:41)      SUBJECTIVE / OVERNIGHT EVENTS: NAONE. Patient endorses coughing and headache ON but that improved this AM. Endorses mild sob. Denies cp/n/v/d/c/fevers/chills.     MEDICATIONS  (STANDING):  azithromycin   Tablet 500 milliGRAM(s) Oral every 24 hours  cefTRIAXone   IVPB 1000 milliGRAM(s) IV Intermittent every 24 hours  enoxaparin Injectable 40 milliGRAM(s) SubCutaneous every 24 hours  influenza  Vaccine (HIGH DOSE) 0.5 milliLiter(s) IntraMuscular once  nystatin Powder 1 Application(s) Topical two times a day    MEDICATIONS  (PRN):  acetaminophen     Tablet .. 650 milliGRAM(s) Oral every 6 hours PRN Temp greater or equal to 38C (100.4F), Mild Pain (1 - 3)  aluminum hydroxide/magnesium hydroxide/simethicone Suspension 30 milliLiter(s) Oral every 4 hours PRN Dyspepsia  melatonin 3 milliGRAM(s) Oral at bedtime PRN Insomnia  ondansetron Injectable 4 milliGRAM(s) IV Push every 8 hours PRN Nausea and/or Vomiting      CAPILLARY BLOOD GLUCOSE        I&O's Summary    12 Oct 2024 07:01  -  13 Oct 2024 07:00  --------------------------------------------------------  IN: 300 mL / OUT: 300 mL / NET: 0 mL    13 Oct 2024 07:01  -  13 Oct 2024 10:44  --------------------------------------------------------  IN: 240 mL / OUT: 0 mL / NET: 240 mL        Vital Signs Last 24 Hrs  T(C): 36.8 (13 Oct 2024 04:00), Max: 37.4 (12 Oct 2024 15:08)  T(F): 98.3 (13 Oct 2024 04:00), Max: 99.3 (12 Oct 2024 15:08)  HR: 100 (13 Oct 2024 04:00) (72 - 100)  BP: 165/91 (13 Oct 2024 04:00) (121/77 - 165/91)  BP(mean): --  RR: 18 (13 Oct 2024 04:00) (18 - 18)  SpO2: 96% (13 Oct 2024 04:00) (91% - 96%)    Parameters below as of 13 Oct 2024 04:00  Patient On (Oxygen Delivery Method): nasal cannula        PHYSICAL EXAM:  GENERAL: no distress  PSYCH: A&O x3  HEAD: Atraumatic, Normocephalic  NECK: Supple, No JVD  CHEST/LUNG: clear to auscultation bilaterally  HEART: regular rate and rhythm, no murmurs  ABDOMEN: nontender to palpation, no rebound tenderness/guarding  EXTREMITIES: no edema on bilateral LE  NEUROLOGY: no focal neurologic deficit  SKIN: No rashes or lesions    LABS:                        13.2   10.15 )-----------( 255      ( 13 Oct 2024 06:20 )             40.1      10-13    136  |  98  |  24[H]  ----------------------------<  115[H]  3.9   |  22  |  0.70    Ca    9.7      13 Oct 2024 06:20  Phos  3.5     10-13  Mg     2.2     10-13    TPro  8.1  /  Alb  3.9  /  TBili  0.6  /  DBili  x   /  AST  29  /  ALT  45  /  AlkPhos  137[H]  10-11          Urinalysis Basic - ( 13 Oct 2024 06:20 )    Color: x / Appearance: x / SG: x / pH: x  Gluc: 115 mg/dL / Ketone: x  / Bili: x / Urobili: x   Blood: x / Protein: x / Nitrite: x   Leuk Esterase: x / RBC: x / WBC x   Sq Epi: x / Non Sq Epi: x / Bacteria: x        RADIOLOGY & ADDITIONAL TESTS:    Imaging Personally Reviewed:    Consultant(s) Notes Reviewed:      Care Discussed with Consultants/Other Providers:   Monae Peter PGY3  pager [phone] or check resident tab for coverage    Patient is a 69y old  Male who presents with a chief complaint of pneumonia (12 Oct 2024 07:41)      SUBJECTIVE / OVERNIGHT EVENTS: NAONE. Patient endorses coughing and headache ON but that improved this AM. Endorses mild sob. Denies cp/n/v/d/c/fevers/chills.     MEDICATIONS  (STANDING):  azithromycin   Tablet 500 milliGRAM(s) Oral every 24 hours  cefTRIAXone   IVPB 1000 milliGRAM(s) IV Intermittent every 24 hours  enoxaparin Injectable 40 milliGRAM(s) SubCutaneous every 24 hours  influenza  Vaccine (HIGH DOSE) 0.5 milliLiter(s) IntraMuscular once  nystatin Powder 1 Application(s) Topical two times a day    MEDICATIONS  (PRN):  acetaminophen     Tablet .. 650 milliGRAM(s) Oral every 6 hours PRN Temp greater or equal to 38C (100.4F), Mild Pain (1 - 3)  aluminum hydroxide/magnesium hydroxide/simethicone Suspension 30 milliLiter(s) Oral every 4 hours PRN Dyspepsia  melatonin 3 milliGRAM(s) Oral at bedtime PRN Insomnia  ondansetron Injectable 4 milliGRAM(s) IV Push every 8 hours PRN Nausea and/or Vomiting      CAPILLARY BLOOD GLUCOSE        I&O's Summary    12 Oct 2024 07:01  -  13 Oct 2024 07:00  --------------------------------------------------------  IN: 300 mL / OUT: 300 mL / NET: 0 mL    13 Oct 2024 07:01  -  13 Oct 2024 10:44  --------------------------------------------------------  IN: 240 mL / OUT: 0 mL / NET: 240 mL        Vital Signs Last 24 Hrs  T(C): 36.8 (13 Oct 2024 04:00), Max: 37.4 (12 Oct 2024 15:08)  T(F): 98.3 (13 Oct 2024 04:00), Max: 99.3 (12 Oct 2024 15:08)  HR: 100 (13 Oct 2024 04:00) (72 - 100)  BP: 165/91 (13 Oct 2024 04:00) (121/77 - 165/91)  BP(mean): --  RR: 18 (13 Oct 2024 04:00) (18 - 18)  SpO2: 96% (13 Oct 2024 04:00) (91% - 96%)    Parameters below as of 13 Oct 2024 04:00  Patient On (Oxygen Delivery Method): nasal cannula        PHYSICAL EXAM:  GENERAL: no distress, some dyspnea while talking on 2L NC  PSYCH: A&O x3  HEAD: Atraumatic, Normocephalic  NECK: Supple, No JVD  CHEST/LUNG: clear to auscultation bilaterally  HEART: regular rate and rhythm, no murmurs  ABDOMEN: nontender to palpation, no rebound tenderness/guarding  EXTREMITIES: no edema on bilateral LE  NEUROLOGY: no focal neurologic deficit  SKIN: No rashes or lesions    LABS:                        13.2   10.15 )-----------( 255      ( 13 Oct 2024 06:20 )             40.1      10-13    136  |  98  |  24[H]  ----------------------------<  115[H]  3.9   |  22  |  0.70    Ca    9.7      13 Oct 2024 06:20  Phos  3.5     10-13  Mg     2.2     10-13    TPro  8.1  /  Alb  3.9  /  TBili  0.6  /  DBili  x   /  AST  29  /  ALT  45  /  AlkPhos  137[H]  10-11          Urinalysis Basic - ( 13 Oct 2024 06:20 )    Color: x / Appearance: x / SG: x / pH: x  Gluc: 115 mg/dL / Ketone: x  / Bili: x / Urobili: x   Blood: x / Protein: x / Nitrite: x   Leuk Esterase: x / RBC: x / WBC x   Sq Epi: x / Non Sq Epi: x / Bacteria: x        RADIOLOGY & ADDITIONAL TESTS:    Imaging Personally Reviewed:    Consultant(s) Notes Reviewed:      Care Discussed with Consultants/Other Providers:

## 2024-10-14 ENCOUNTER — TRANSCRIPTION ENCOUNTER (OUTPATIENT)
Age: 69
End: 2024-10-14

## 2024-10-14 LAB
ANION GAP SERPL CALC-SCNC: 12 MMOL/L — SIGNIFICANT CHANGE UP (ref 5–17)
BUN SERPL-MCNC: 13 MG/DL — SIGNIFICANT CHANGE UP (ref 7–23)
CALCIUM SERPL-MCNC: 9.4 MG/DL — SIGNIFICANT CHANGE UP (ref 8.4–10.5)
CHLORIDE SERPL-SCNC: 97 MMOL/L — SIGNIFICANT CHANGE UP (ref 96–108)
CO2 SERPL-SCNC: 24 MMOL/L — SIGNIFICANT CHANGE UP (ref 22–31)
CREAT SERPL-MCNC: 0.6 MG/DL — SIGNIFICANT CHANGE UP (ref 0.5–1.3)
EGFR: 103 ML/MIN/1.73M2 — SIGNIFICANT CHANGE UP
GLUCOSE SERPL-MCNC: 151 MG/DL — HIGH (ref 70–99)
GRAM STN FLD: ABNORMAL
HCT VFR BLD CALC: 37.6 % — LOW (ref 39–50)
HGB BLD-MCNC: 12.4 G/DL — LOW (ref 13–17)
MAGNESIUM SERPL-MCNC: 2.1 MG/DL — SIGNIFICANT CHANGE UP (ref 1.6–2.6)
MCHC RBC-ENTMCNC: 32.4 PG — SIGNIFICANT CHANGE UP (ref 27–34)
MCHC RBC-ENTMCNC: 33 GM/DL — SIGNIFICANT CHANGE UP (ref 32–36)
MCV RBC AUTO: 98.2 FL — SIGNIFICANT CHANGE UP (ref 80–100)
NRBC # BLD: 0 /100 WBCS — SIGNIFICANT CHANGE UP (ref 0–0)
PHOSPHATE SERPL-MCNC: 3.1 MG/DL — SIGNIFICANT CHANGE UP (ref 2.5–4.5)
PLATELET # BLD AUTO: 276 K/UL — SIGNIFICANT CHANGE UP (ref 150–400)
POTASSIUM SERPL-MCNC: 3.8 MMOL/L — SIGNIFICANT CHANGE UP (ref 3.5–5.3)
POTASSIUM SERPL-SCNC: 3.8 MMOL/L — SIGNIFICANT CHANGE UP (ref 3.5–5.3)
RBC # BLD: 3.83 M/UL — LOW (ref 4.2–5.8)
RBC # FLD: 12.2 % — SIGNIFICANT CHANGE UP (ref 10.3–14.5)
SODIUM SERPL-SCNC: 133 MMOL/L — LOW (ref 135–145)
SPECIMEN SOURCE: SIGNIFICANT CHANGE UP
WBC # BLD: 8.29 K/UL — SIGNIFICANT CHANGE UP (ref 3.8–10.5)
WBC # FLD AUTO: 8.29 K/UL — SIGNIFICANT CHANGE UP (ref 3.8–10.5)

## 2024-10-14 PROCEDURE — 99233 SBSQ HOSP IP/OBS HIGH 50: CPT | Mod: GC

## 2024-10-14 PROCEDURE — 74230 X-RAY XM SWLNG FUNCJ C+: CPT | Mod: 26

## 2024-10-14 RX ORDER — PREDNISONE 5 MG/1
40 TABLET ORAL DAILY
Refills: 0 | Status: DISCONTINUED | OUTPATIENT
Start: 2024-10-14 | End: 2024-10-15

## 2024-10-14 RX ORDER — TIOTROPIUM BROMIDE INHALATION SPRAY 3.12 UG/1
2 SPRAY, METERED RESPIRATORY (INHALATION) DAILY
Refills: 0 | Status: DISCONTINUED | OUTPATIENT
Start: 2024-10-14 | End: 2024-10-15

## 2024-10-14 RX ORDER — IPRATROPIUM BROMIDE AND ALBUTEROL SULFATE .5; 3 MG/3ML; MG/3ML
3 SOLUTION RESPIRATORY (INHALATION) EVERY 6 HOURS
Refills: 0 | Status: DISCONTINUED | OUTPATIENT
Start: 2024-10-14 | End: 2024-10-15

## 2024-10-14 RX ORDER — GUAIFENESIN 100 MG/5ML
600 SOLUTION ORAL EVERY 12 HOURS
Refills: 0 | Status: DISCONTINUED | OUTPATIENT
Start: 2024-10-14 | End: 2024-10-15

## 2024-10-14 RX ADMIN — Medication 5 MILLIGRAM(S): at 21:59

## 2024-10-14 RX ADMIN — IPRATROPIUM BROMIDE AND ALBUTEROL SULFATE 3 MILLILITER(S): .5; 3 SOLUTION RESPIRATORY (INHALATION) at 11:23

## 2024-10-14 RX ADMIN — Medication 1 DOSE(S): at 11:23

## 2024-10-14 RX ADMIN — IPRATROPIUM BROMIDE AND ALBUTEROL SULFATE 3 MILLILITER(S): .5; 3 SOLUTION RESPIRATORY (INHALATION) at 17:14

## 2024-10-14 RX ADMIN — NYSTATIN 1 APPLICATION(S): 100000 POWDER TOPICAL at 17:26

## 2024-10-14 RX ADMIN — ENOXAPARIN SODIUM 40 MILLIGRAM(S): 150 INJECTION SUBCUTANEOUS at 06:04

## 2024-10-14 RX ADMIN — PREDNISONE 40 MILLIGRAM(S): 5 TABLET ORAL at 17:12

## 2024-10-14 RX ADMIN — TIOTROPIUM BROMIDE INHALATION SPRAY 2 PUFF(S): 3.12 SPRAY, METERED RESPIRATORY (INHALATION) at 11:25

## 2024-10-14 RX ADMIN — GUAIFENESIN 600 MILLIGRAM(S): 100 SOLUTION ORAL at 17:13

## 2024-10-14 RX ADMIN — Medication 2.5 MILLIGRAM(S): at 06:08

## 2024-10-14 RX ADMIN — NYSTATIN 1 APPLICATION(S): 100000 POWDER TOPICAL at 06:04

## 2024-10-14 RX ADMIN — Medication 1 DOSE(S): at 17:26

## 2024-10-14 RX ADMIN — Medication 2.5 MILLIGRAM(S): at 00:08

## 2024-10-14 RX ADMIN — Medication 100 MILLIGRAM(S): at 17:13

## 2024-10-14 NOTE — SWALLOW VFSS/MBS ASSESSMENT ADULT - ORAL PHASE COMMENTS
Trace-mild oral cavity residue clears w/ repeat swallows and during subsequent trials. Mild oral cavity residue clears w/ spontaneous repeat swallow. Piecemeal deglutition

## 2024-10-14 NOTE — SWALLOW VFSS/MBS ASSESSMENT ADULT - COMMENTS
Per medicine note-->Pt w/ Hx laryngeal cancer s/p radiation to neck, now had dysphagia due to radiation changes. Pt reports he had swallow eval study previously and recommended to take small bites and drink w/ straw.    Imaging:  10/11: CXR: New hazy opacity in the right lower lung field, suspicious for pneumonia.    Swallow Hx: Pt seen by this service for bedside swallow evaluation 10/13, w/ rx for easy to chew with thin liquids and objective testing (MBS) to r/o aspiration given chest imaging findings.

## 2024-10-14 NOTE — SWALLOW VFSS/MBS ASSESSMENT ADULT - ORAL PHASE
Mildly reduced A-P transport Piecemeal deglutition Piecemeal deglutition on larger bolus size Piecemeal deglutition on larger bolus size resulting in mild-moderate spillover of oral cavity residue to the valleculae and to the pyriform sinuses.

## 2024-10-14 NOTE — DISCHARGE NOTE PROVIDER - HOSPITAL COURSE
HPI:  68 yo M with PMHx laryngeal cancer s/p radiation and in remission, COPD/emphysema (on documentation), PRISCA not on cpap (on documentation) who presents to ED due to fever, productive cough and progressive dyspnea x5 days. Endorsing cough w/ yellow-green sputum. Went to Urgent Care today 10/12 and found to have O2 sat 88% and sent to ED.     ED Course:  In the ED, Tmax 100.3F, HRmax 109, BP stable, RRmax 22, on 3LNC saturating 93-94%. Labs significant for WBCs 17.46. Negative COVID/flu/RSV. CXR with new RLL opacity c/f PNA.     Hospital Course:  Patient treated with Azithromycin and Ceftriaxone. Negative urine legionella, strep pneumo and MRSA PCR. BCx without growth. Patient w/ downtrending leukocytosis.       Medication Changes:      Items to Follow-Up On: HPI:  68 yo M with PMHx laryngeal cancer s/p radiation and in remission, COPD/emphysema (on documentation), PRISCA not on cpap (on documentation) who presents to ED due to fever, productive cough and progressive dyspnea x5 days. Endorsing cough w/ yellow-green sputum. Went to Urgent Care today 10/12 and found to have O2 sat 88% and sent to ED.     ED Course:  In the ED, Tmax 100.3F, HRmax 109, BP stable, RRmax 22, on 3LNC saturating 93-94%. Labs significant for WBCs 17.46. Negative COVID/flu/RSV. CXR with new RLL opacity c/f PNA.     Hospital Course:  Patient treated with Azithromycin and Ceftriaxone. Also started 5-day course of PO Prednisone 40mg QD. Pulm consulted. Negative urine legionella, strep pneumo and MRSA PCR. BCx without growth. Supplemental O2 weaned off, pt now satting mid-90s on RA, no resp distress. Also had speech & swallow eval and cisensophagram without aspiration. Patient has remained afebrile, HDS, with resolved leukocytosis. Patient is stable and medically cleared for discharge home.     Medication Changes:  - Start taking       Items to Follow-Up On:   - Outpatient follow-up with PCP  - Outpatient follow-u with Pulmonology  - Outpatient repeat chest x-ray in 6-8 weeks to assess for resolution of pneumonia HPI:  68 yo M with PMHx laryngeal cancer s/p radiation and in remission, COPD/emphysema (on documentation), PRISCA not on cpap (on documentation) who presents to ED due to fever, productive cough and progressive dyspnea x5 days. Endorsing cough w/ yellow-green sputum. Went to Urgent Care today 10/12 and found to have O2 sat 88% and sent to ED.     ED Course:  In the ED, Tmax 100.3F, HRmax 109, BP stable, RRmax 22, on 3LNC saturating 93-94%. Labs significant for WBCs 17.46. Negative COVID/flu/RSV. CXR with new RLL opacity c/f PNA.     Hospital Course:  Patient treated with Azithromycin and Ceftriaxone. Pulm consulted. Also started 5-day course of PO Prednisone 40mg QD. Additionally initiated on Advair and Albuterol due to c/f underlying emphysema/COPD as noted on CT chest 09/2024. Negative urine legionella, strep pneumo and MRSA PCR. BCx without growth. Supplemental O2 weaned off, pt now satting mid-90s on RA, no resp distress. Ambulatory O2 sat 89% on RA. Patient w/ improved productive cough, pleuritic CP and LOMAX. Also had speech & swallow eval and cisensophagram without aspiration. Patient has remained afebrile, HDS, with resolved leukocytosis. Patient is stable and medically cleared for discharge home.     Medication Changes:  - Start taking Prednisone 40mg daily for 4 days, last dose on 10/18/2024  - Start taking Cefuroxime 500mg twice per day for 3 days, last dose on 10/17/2024  - Start using DuoNeb treatments every 6 hours, as needed for shortness of breath  - Start using Symbicort inhaler 2 puffs twice per day      Items to Follow-Up On:   - Outpatient follow-up with PCP  - Outpatient follow-up with Pulmonology  - Outpatient repeat chest x-ray in 6-8 weeks to assess for resolution of pneumonia HPI:  70 yo M with PMHx laryngeal cancer s/p radiation and in remission, COPD/emphysema (on documentation), PRISCA not on cpap (on documentation) who presents to ED due to fever, productive cough and progressive dyspnea x5 days. Endorsing cough w/ yellow-green sputum. Went to Urgent Care today 10/12 and found to have O2 sat 88% and sent to ED.     ED Course:  In the ED, Tmax 100.3F, HRmax 109, BP stable, RRmax 22, on 3LNC saturating 93-94%. Labs significant for WBCs 17.46. Negative COVID/flu/RSV. CXR with new RLL opacity c/f PNA.     Hospital Course:  Patient treated with Azithromycin and Ceftriaxone. Pulm consulted. Also started 5-day course of PO Prednisone 40mg QD. Additionally initiated on Advair and Albuterol due to c/f underlying emphysema/COPD as noted on CT chest 09/2024. Negative urine legionella, strep pneumo and MRSA PCR. BCx without growth. Supplemental O2 weaned off, pt now satting mid-90s on RA, no resp distress. Ambulatory O2 sat 89% on RA. Patient w/ improved productive cough, pleuritic CP and LOMAX. Also had speech & swallow eval and cisensophagram without aspiration. Patient has remained afebrile, HDS, with resolved leukocytosis. Patient is stable and medically cleared for discharge home.     Medication Changes:  - Start taking Prednisone 40mg daily for 4 days, last dose on 10/18/2024  - Start taking Cefuroxime 500mg twice per day for 3 days, last dose on 10/17/2024  - Start using DuoNeb treatments every 6 hours, as needed for shortness of breath  - Start using Symbicort inhaler 2 puffs twice per day      Items to Follow-Up On:   - Outpatient follow-up with PCP  - Outpatient follow-up with Pulmonology (Dr. Melo) on 10/23/2024 at 8:30 AM  - Outpatient repeat chest x-ray in 6-8 weeks to assess for resolution of pneumonia HPI:  70 yo M with PMHx laryngeal cancer s/p radiation and in remission, COPD/emphysema (on documentation), PRISCA not on cpap (on documentation) who presents to ED due to fever, productive cough and progressive dyspnea x5 days. Endorsing cough w/ yellow-green sputum. Went to Urgent Care today 10/12 and found to have O2 sat 88% and sent to ED.     ED Course:  In the ED, Tmax 100.3F, HRmax 109, BP stable, RRmax 22, on 3LNC saturating 93-94%. Labs significant for WBCs 17.46. Negative COVID/flu/RSV. CXR with new RLL opacity c/f PNA.     Hospital Course:  Patient treated with Azithromycin and Ceftriaxone. Pulm consulted. Also started 5-day course of PO Prednisone 40mg QD for COPD exacerbation iso PNA. Additionally initiated on Advair and Albuterol due to c/f underlying emphysema/COPD as noted on CT chest 09/2024. Negative urine legionella, strep pneumo and MRSA PCR. BCx without growth. Supplemental O2 weaned off, pt now satting mid-90s on RA, no resp distress. Ambulatory O2 sat 89% on RA. Patient w/ improved productive cough, pleuritic CP and LOMAX. Also had speech & swallow eval and cisensophagram without aspiration. Patient has remained afebrile, HDS, with resolved leukocytosis. Patient is stable and medically cleared for discharge home.     Medication Changes:  - Start taking Prednisone 40mg daily for 4 days, last dose on 10/18/2024  - Start taking Cefuroxime 500mg twice per day for 3 days, last dose on 10/17/2024  - Start using DuoNeb treatments every 6 hours, as needed for shortness of breath  - Start using Advair inhaler 2 puffs twice per day and Spiriva      Items to Follow-Up On:   - Outpatient follow-up with PCP  - Outpatient follow-up with Pulmonology (Dr. Melo) on 10/23/2024 at 8:30 AM  - Outpatient repeat chest x-ray in 6-8 weeks to assess for resolution of pneumonia

## 2024-10-14 NOTE — PROGRESS NOTE ADULT - PROBLEM SELECTOR PLAN 1
- Met sepsis criteria with WBCs 17.56, RR 22, also almost febrile w/ tmax 100.3F   - Negative COVID/flu/RSV  - CXR (10/11): new hazy RLL opacity c/f PNA   - Given location of PNA, concern for aspiration though more likely CAP    PLAN:  - c/w Azithromycin and Ceftriaxone (10/12-  - c/w supplemental O2, will obtain ambulatory o2 sat  - Wean supplemental O2 as tolerated   - Patient may also have underlying CPOD due to smoking hx, will start patient on advair and albuterol q6  - f/u infx work-up: BCx ngtd  - Tylenol 650 q6 PRN for fever  - Monitor fever curve, WBCs   - Aspiration precautions - Met sepsis criteria with WBCs 17.56, RR 22, also almost febrile w/ tmax 100.3F   - Negative COVID/flu/RSV, legionella, strep pneumo, MRSA PCR  - BCx NGTD x48 hrs   - CXR (10/11): new hazy RLL opacity c/f PNA   - Given location of PNA, concern for aspiration though more likely CAP    PLAN:  - c/w Azithromycin and Ceftriaxone (10/12-  - c/w supplemental O2, currently on 3L NC, and obtain ambulatory O2 sat  - Wean supplemental O2 as tolerated   - c/w Advair and Albuterol q6h (pt may also have underlying COPD due to smoking hx)   - Tylenol 650 q6 PRN for fever  - Monitor fever curve, WBCs   - Aspiration precautions - Met sepsis criteria with WBCs 17.56, RR 22, also almost febrile w/ tmax 100.3F   - Negative COVID/flu/RSV, legionella, strep pneumo, MRSA PCR  - BCx NGTD x48 hrs   - CXR (10/11): new hazy RLL opacity c/f PNA   - Given location of PNA, concern for aspiration though more likely CAP    PLAN:  - c/w Azithromycin and Ceftriaxone (10/12-  - c/w supplemental O2, currently on 3L NC, and obtain ambulatory O2 sat  - Wean supplemental O2 as tolerated.  - c/w Advair and Albuterol q6h (pt may also have underlying COPD due to smoking hx)   - Tylenol 650 q6 PRN for fever  - Monitor fever curve, WBCs   - Aspiration precautions  - Pulm consulted. - Met sepsis criteria with WBCs 17.56, RR 22, also almost febrile w/ tmax 100.3F   - Negative COVID/flu/RSV, legionella, strep pneumo, MRSA PCR  - BCx NGTD x48 hrs   - CXR (10/11): new hazy RLL opacity c/f PNA   - Given location of PNA, concern for aspiration though more likely CAP    PLAN:  - c/w Azithromycin and Ceftriaxone (10/12-  - c/w supplemental O2, currently on 3L NC, and obtain ambulatory O2 sat  - Wean supplemental O2 as tolerated.  - c/w Advair and Albuterol q6h (pt had hx smoking, evidence of emphysema on CT chest from 9/2024)   - Tylenol 650 q6 PRN for fever  - Monitor fever curve, WBCs   - Aspiration precautions  - Pulm consulted, f/u reccs

## 2024-10-14 NOTE — DISCHARGE NOTE PROVIDER - NSDCCPTREATMENT_GEN_ALL_CORE_FT
PRINCIPAL PROCEDURE  Procedure: Chest xray, PA & lateral  Findings and Treatment: FINDINGS:  New hazy opacity in the right lower lung field.  No pleural effusion. No pneumothorax.  The heart is normal in size  The visualized osseous structures demonstrate no acute pathology.  IMPRESSION:  New hazy opacity in the right lower lung field, suspicious for pneumonia.

## 2024-10-14 NOTE — SWALLOW VFSS/MBS ASSESSMENT ADULT - LARYNGEAL PENETRATION AFTER THE SWALLOW - COUGH
Mild; Deep; On larger straw sip; From residue in the pyriform sinuses that pools over the arytenoids deep to the vocal cords; Pt sensate via cough response. Trace amount of material remains shallow in the laryngeal vestibule.

## 2024-10-14 NOTE — PROGRESS NOTE ADULT - PROBLEM SELECTOR PLAN 2
- Pt w/ Hx laryngeal cancer s/p radiation to neck, now had dysphagia due to radiation changes  - Pt reports he had swallow eval study previously and recommended to take small bites and drink w/ straw    PLAN:  - Easy-to-chew and thin liquids, pt to use straw  - f/u speech eval  - Aspiration precautions - Pt w/ Hx laryngeal cancer s/p radiation to neck, now had dysphagia due to radiation changes  - Pt reports he had swallow eval study previously and recommended to take small bites and drink w/ straw  - 10/14 Speech and swallow evaluation: mild oropharyngeal dysphagia likely acute on chronic in nature, though functional for a regular diet. Swallow sequence notable for mildly reduced A-P transport and mildly reduced anterior hyolaryngeal movement resulting in mild pharyngeal residue that clears w/ repeat swallows/liquid washes. Patient able to independently carryover strategies. No aspiration observed on this exam.     PLAN:  - Easy-to-chew and thin liquids, pt to use straw  - Aspiration precautions. - Pt w/ Hx laryngeal cancer s/p radiation to neck, now had dysphagia due to radiation changes  - Pt reports he had swallow eval study previously and recommended to take small bites and drink w/ straw  - 10/14 Speech and swallow evaluation: mild oropharyngeal dysphagia likely acute on chronic in nature, though functional for a regular diet. Swallow sequence notable for mildly reduced A-P transport and mildly reduced anterior hyolaryngeal movement resulting in mild pharyngeal residue that clears w/ repeat swallows/liquid washes. Patient able to independently carryover strategies. No aspiration observed on this exam.     PLAN:  - Regular diet. No dysphagia concerns.   - Aspiration precaution

## 2024-10-14 NOTE — CONSULT NOTE ADULT - SUBJECTIVE AND OBJECTIVE BOX
10-14-24 @ 14:10    Patient is a 69y old  Male who presents with a chief complaint of pneumonia (12 Oct 2024 07:41)      HPI:  69M PMHx laryngeal ca s/p radiation in remission, copd (on documentation), prisca not on cpap (on documentation). Presenting w/ subjective fever, cough, dyspnea x5d. Went to  today, found w/ o2 sat 88% and sent to ED.  In the ED, Tmax 100.3F, HRmax 109, BP stable, RRmax 22, on 3LNC saturating 93-94%. CBC w/ wbc 17.46, hgb 14.5, plt 257. CMP w/ Na 134, SCr 0.88, alk phos 137, ast and alt wnl. VBG w/ lactate 1.4. covid/rsv/flu neg. CXR w/ new opacity RLL, c/f pna. On my exam/interview, pt aaox3 and comfortable at rest with 2LNC. Pt ambulated to and from bathroom without oxygen and c/o dyspnea and pleuritic cp on right lower chest and o2 sat 84%.  (12 Oct 2024 01:53)  nowp ulm called:  he looks OK and feels better too : he has an extensive smoking hisotry  now quit for many years:   since last to last sunday he started getting sick and now admitted with pneumonia few days ago :       ?FOLLOWING PRESENT  [ x] Hx of PE/DVT, [ y] Hx COPD, [ ]x Hx of Asthma, [y ] Hx of Hospitalization, [x ]  Hx of BiPAP/CPAP use, [x ] Hx of PRISCA    Allergies    No Known Allergies    Intolerances        PAST MEDICAL & SURGICAL HISTORY:  Laryngeal cancer      Sleep apnea  non-compliant      H/O cataract      S/P cholecystectomy  laparoscopic 2008      S/P cataract surgery          FAMILY HISTORY:  FH: breast cancer (Mother)        Social History: [fomer smoker > 40 pk years  ] TOBACCO                  [ x ] ETOH                                 [ x ] IVDA/DRUGS    REVIEW OF SYSTEMS      General:	weak    Skin/Breast:x  	  Ophthalmologic:x  	  ENMT:	x    Respiratory and Thorax:  sob  	  Cardiovascular:	x    Gastrointestinal:	x    Genitourinary:	x  x  Musculoskeletal:	  x    Psychiatric:	  x  Hematology/Lymphatics:	x    Endocrine:	x    Allergic/Immunologic:	x    MEDICATIONS  (STANDING):  albuterol/ipratropium for Nebulization 3 milliLiter(s) Nebulizer every 6 hours  azithromycin   Tablet 500 milliGRAM(s) Oral every 24 hours  cefTRIAXone   IVPB 1000 milliGRAM(s) IV Intermittent every 24 hours  enoxaparin Injectable 40 milliGRAM(s) SubCutaneous every 24 hours  fluticasone propionate/ salmeterol 100-50 MICROgram(s) Diskus 1 Dose(s) Inhalation two times a day  influenza  Vaccine (HIGH DOSE) 0.5 milliLiter(s) IntraMuscular once  ketorolac   Injectable 15 milliGRAM(s) IV Push once  melatonin 5 milliGRAM(s) Oral at bedtime  nystatin Powder 1 Application(s) Topical two times a day  tiotropium 2.5 MICROgram(s) Inhaler 2 Puff(s) Inhalation daily    MEDICATIONS  (PRN):  acetaminophen     Tablet .. 650 milliGRAM(s) Oral every 6 hours PRN Temp greater or equal to 38C (100.4F), Mild Pain (1 - 3)  aluminum hydroxide/magnesium hydroxide/simethicone Suspension 30 milliLiter(s) Oral every 4 hours PRN Dyspepsia  melatonin 3 milliGRAM(s) Oral at bedtime PRN Insomnia  ondansetron Injectable 4 milliGRAM(s) IV Push every 8 hours PRN Nausea and/or Vomiting       Vital Signs Last 24 Hrs  T(C): 36.8 (14 Oct 2024 12:00), Max: 36.8 (14 Oct 2024 12:00)  T(F): 98.3 (14 Oct 2024 12:00), Max: 98.3 (14 Oct 2024 12:00)  HR: 85 (14 Oct 2024 12:00) (85 - 99)  BP: 136/87 (14 Oct 2024 12:00) (116/75 - 137/86)  BP(mean): --  RR: 18 (14 Oct 2024 12:00) (18 - 18)  SpO2: 89% (14 Oct 2024 12:05) (89% - 94%)    Parameters below as of 14 Oct 2024 12:05  Patient On (Oxygen Delivery Method): room air    Orthostatic VS          I&O's Summary    13 Oct 2024 07:01  -  14 Oct 2024 07:00  --------------------------------------------------------  IN: 240 mL / OUT: 0 mL / NET: 240 mL    14 Oct 2024 07:01  -  14 Oct 2024 14:10  --------------------------------------------------------  IN: 300 mL / OUT: 0 mL / NET: 300 mL        Physical Exam:   GENERAL: NAD, well-groomed, well-developed  HEENT: LAXMI/   Atraumatic, Normocephalic  ENMT: No tonsillar erythema, exudates, or enlargement; Moist mucous membranes, Good dentition, No lesions  NECK: Supple, No JVD, Normal thyroid  CHEST/LUNG: Poor air entry   CVS: Regular rate and rhythm; No murmurs, rubs, or gallops  GI: : Soft, Nontender, Nondistended; Bowel sounds present  NERVOUS SYSTEM:  Alert & Oriented X3  EXTREMITIES:- edema  LYMPH: No lymphadenopathy noted  SKIN: No rashes or lesions  ENDOCRINOLOGY: No Thyromegaly  PSYCH: Appropriate    Labs:  2.2<43<4>>39<<7.415>>2.2<<3><<4><<5<<399>>                            12.4   8.29  )-----------( 276      ( 14 Oct 2024 07:07 )             37.6                         13.2   10.15 )-----------( 255      ( 13 Oct 2024 06:20 )             40.1                         13.0   13.38 )-----------( 233      ( 12 Oct 2024 07:22 )             39.5                         14.5   17.46 )-----------( 257      ( 11 Oct 2024 16:33 )             43.3     10-14    133[L]  |  97  |  13  ----------------------------<  151[H]  3.8   |  24  |  0.60  10-13    136  |  98  |  24[H]  ----------------------------<  115[H]  3.9   |  22  |  0.70  10-12    134[L]  |  98  |  19  ----------------------------<  136[H]  3.8   |  23  |  0.71  10-11    134[L]  |  97  |  22  ----------------------------<  128[H]  3.9   |  22  |  0.88    Ca    9.4      14 Oct 2024 07:06  Ca    9.7      13 Oct 2024 06:20  Phos  3.1     10-14  Phos  3.5     10-13  Mg     2.1     10-14  Mg     2.2     10-13    TPro  8.1  /  Alb  3.9  /  TBili  0.6  /  DBili  x   /  AST  29  /  ALT  45  /  AlkPhos  137[H]  10-11    CAPILLARY BLOOD GLUCOSE            Urinalysis Basic - ( 14 Oct 2024 07:06 )    Color: x / Appearance: x / SG: x / pH: x  Gluc: 151 mg/dL / Ketone: x  / Bili: x / Urobili: x   Blood: x / Protein: x / Nitrite: x   Leuk Esterase: x / RBC: x / WBC x   Sq Epi: x / Non Sq Epi: x / Bacteria: x      Culture - Blood (collected 11 Oct 2024 17:20)  Source: .Blood BLOOD  Preliminary Report (13 Oct 2024 22:01):    No growth at 48 Hours    Culture - Blood (collected 11 Oct 2024 17:00)  Source: .Blood BLOOD  Preliminary Report (13 Oct 2024 22:01):    No growth at 48 Hours      D DImer      Studies  Chest X-RAY  CT SCAN Chest   CT Abdomen  Venous Dopplers: LE:   Others    rad< from: Xray Chest 2 Views PA/Lat (10.11.24 @ 16:45) >        INTERPRETATION:  CLINICAL INDICATION: Cough.    EXAM: Two views of the chest.    COMPARISON: Chest radiograph from 5/20/2024    FINDINGS:  New hazy opacity in the right lower lung field.  No pleural effusion. No pneumothorax.  The heart is normal in size  The visualized osseous structures demonstrate no acute pathology.    IMPRESSION:  New hazy opacity in the right lower lung field, suspicious for pneumonia.    --- End of Report ---          HUGO GAMBLE MD; Resident Radiologist  This document has been electronically signed.  JINNY SYED MD; Attending Radiologist  This document has been electronically signed. Oct 11 2024 11:37PM    < end of copied text >  < from: CT Chest w/ IV Cont (09.04.24 @ 10:09) >    Evaluation of the upperabdomen demonstrate cholecystectomy. Colonic   diverticulosis. 1.3 cm enhancing lesion within the partially imaged liver   is without significant interval change since the chest CT of February 11, 2021.    Evaluation of the lungs demonstrate emphysema. No pneumonia. Minimal   linear or subsegmental atelectasis at the right lung base is unchanged.   Trace secretions within the trachea extending into the bronchus   intermedius.    Spinal degenerative changes.    IMPRESSION: No evidence of intrathoracic metastatic disease.    For complete evaluation of the neck, please refer to dedicated CT   performed on the same day.    --- End of Report ---               SCOTTIE ORELLANA MD; Attending Radiologist   This document has been electronically signed.Sep 16 2024  2:25PM    < end of copied text >

## 2024-10-14 NOTE — SWALLOW VFSS/MBS ASSESSMENT ADULT - RECOMMENDED CONSISTENCY
Regular Solid/Thin Liquid via Single Sips (Encourage intermittent throat clear/cough with repeat swallow) Regular Solid/Thin Liquid via Small Single Straw Sips (Encourage intermittent throat clear/cough with repeat swallow)

## 2024-10-14 NOTE — SWALLOW VFSS/MBS ASSESSMENT ADULT - DIAGNOSTIC IMPRESSIONS
69yoM w/ PMHx of laryngeal cancer s/p radiation (remission x5 years) p/w cough, fever, and dyspnea, CXR w/ RLL hazy opacity a/w sepsis and AHRF 2/2 PNA (c/f aspiration vs CAP). Pt p/w a mild oropharyngeal dysphagia likely acute on chronic in nature, though functional for a regular diet. Swallow sequence notable for mildly reduced A-P transport and mildly reduced anterior hyolaryngeal movement resulting in mild pharyngeal residue that clears w/ repeat swallows/liquid washes. Of note, there is deep laryngeal penetration of larger straw sips of thin liquid that occurs after the initial swallow from pharyngeal residue. Pt sensate via cough; however, trace amount of material remains shallow in the laryngeal vestibule. Reducing bolus size to SMALL single straw sip w/ application of throat clear/cough and repeat swallow strategies are successful in improving airway protection. Pt able to independently carryover strategies. No aspiration observed on this exam. This service will continue to follow to monitor tolerance of recommended diet and to review safe swallowing strategies.    Disorders: reduced lingual strength/ROM/Rate of motion, reduced BOT to posterior pharyngeal wall contact, mildly reduced hyo-laryngeal elevation/excursion, reduced laryngeal closure, reduced supraglottic sensation. 69yoM w/ PMHx of laryngeal cancer s/p radiation (remission x5 years) p/w cough, fever, and dyspnea, CXR w/ RLL hazy opacity a/w sepsis and AHRF 2/2 PNA (c/f aspiration vs CAP). Pt p/w a mild oropharyngeal dysphagia likely acute on chronic in nature, though functional for a regular diet. Swallow sequence notable for mildly reduced A-P transport and mildly reduced anterior hyolaryngeal movement resulting in mild-moderate vallecular residue that reduces w/ repeat swallows. Of note, there is deep laryngeal penetration of larger straw sips of thin liquid that occurs after the initial swallow from pharyngeal residue. Pt sensate via cough; however, trace amount of material remains shallow in the laryngeal vestibule. Reducing bolus size to SMALL single straw sip w/ application of throat clear/cough and repeat swallow strategies are successful in improving airway protection. Pt able to independently carryover strategies. No aspiration observed on this exam. This service will continue to follow to monitor tolerance of recommended diet and to review safe swallowing strategies.    Disorders: reduced BOT to posterior pharyngeal wall contact, mildly reduced hyo-laryngeal elevation/excursion, reduced laryngeal closure, reduced supraglottic sensation.

## 2024-10-14 NOTE — PROGRESS NOTE ADULT - SUBJECTIVE AND OBJECTIVE BOX
Progress Note    ==========Authored by:==========  Jessica Adam MD   PGY-1 Internal Medicine  Available on [facility] TEAMS    10-11-24 (3d)    Patient is a 69y old  Male who presents with a chief complaint of pneumonia (12 Oct 2024 07:41)      Interval/Overnight Events: KURT. Overnight pt desatted to 88-89% on RA, placed back on 2L NC.     Subjective: Patient seen and examined at bedside.       MEDICATIONS  (STANDING):  albuterol    0.083% 2.5 milliGRAM(s) Nebulizer every 6 hours  albuterol    90 MICROgram(s) HFA Inhaler 1 Puff(s) Inhalation every 4 hours  azithromycin   Tablet 500 milliGRAM(s) Oral every 24 hours  cefTRIAXone   IVPB 1000 milliGRAM(s) IV Intermittent every 24 hours  enoxaparin Injectable 40 milliGRAM(s) SubCutaneous every 24 hours  fluticasone propionate/ salmeterol 100-50 MICROgram(s) Diskus 1 Dose(s) Inhalation two times a day  influenza  Vaccine (HIGH DOSE) 0.5 milliLiter(s) IntraMuscular once  ketorolac   Injectable 15 milliGRAM(s) IV Push once  melatonin 5 milliGRAM(s) Oral at bedtime  nystatin Powder 1 Application(s) Topical two times a day    MEDICATIONS  (PRN):  acetaminophen     Tablet .. 650 milliGRAM(s) Oral every 6 hours PRN Temp greater or equal to 38C (100.4F), Mild Pain (1 - 3)  aluminum hydroxide/magnesium hydroxide/simethicone Suspension 30 milliLiter(s) Oral every 4 hours PRN Dyspepsia  melatonin 3 milliGRAM(s) Oral at bedtime PRN Insomnia  ondansetron Injectable 4 milliGRAM(s) IV Push every 8 hours PRN Nausea and/or Vomiting          PHYSICAL EXAM:  Vital Signs Last 24 Hrs  T(C): 36.7 (14 Oct 2024 04:03), Max: 37.4 (13 Oct 2024 12:00)  T(F): 98.1 (14 Oct 2024 04:03), Max: 99.3 (13 Oct 2024 12:00)  HR: 86 (14 Oct 2024 04:03) (86 - 99)  BP: 137/86 (14 Oct 2024 04:03) (116/75 - 155/90)  BP(mean): --  RR: 18 (14 Oct 2024 04:03) (18 - 18)  SpO2: 94% (14 Oct 2024 04:03) (88% - 94%)    Parameters below as of 14 Oct 2024 04:03  Patient On (Oxygen Delivery Method): nasal cannula        I&O's Summary    13 Oct 2024 07:01  -  14 Oct 2024 07:00  --------------------------------------------------------  IN: 240 mL / OUT: 0 mL / NET: 240 mL          PHYSICAL EXAM    GENERAL: NAD, non-toxic appearing  EYES: EOMI, PERRLA, conjunctiva and sclera clear  ENT: moist mucous membranes  CV: RRR, normal S1 and S2. No mumurs, rubs, or gallops. No JVD.   LUNGS: Clear to auscultation bilaterally. No rales, rhonchi, or wheezing.   ABDOMEN: Soft, nontender, nondistended, +bowel sounds   NEURO: AOx4, no FND  PSYCH: Appropriate affect  EXTREMITIES: No edema, cyanosis, or clubbing. 2+ peripheral pulses.  LYMPH: No lymphadenopathy noted  SKIN: No rashes or lesions        LABS:  CAPILLARY BLOOD GLUCOSE                                  13.2   10.15 )-----------( 255      ( 13 Oct 2024 06:20 )             40.1       WBC Trend: 10.15<--, 13.38<--, 17.46<--  Hb Trend: 13.2<--, 13.0<--, 14.5<--    10-13    136  |  98  |  24[H]  ----------------------------<  115[H]  3.9   |  22  |  0.70    Ca    9.7      13 Oct 2024 06:20  Phos  3.5     10-13  Mg     2.2     10-13            Urinalysis Basic - ( 13 Oct 2024 06:20 )    Color: x / Appearance: x / SG: x / pH: x  Gluc: 115 mg/dL / Ketone: x  / Bili: x / Urobili: x   Blood: x / Protein: x / Nitrite: x   Leuk Esterase: x / RBC: x / WBC x   Sq Epi: x / Non Sq Epi: x / Bacteria: x        Culture - Blood (collected 11 Oct 2024 17:20)  Source: .Blood BLOOD  Preliminary Report (13 Oct 2024 22:01):    No growth at 48 Hours    Culture - Blood (collected 11 Oct 2024 17:00)  Source: .Blood BLOOD  Preliminary Report (13 Oct 2024 22:01):    No growth at 48 Hours          RADIOLOGY & ADDITIONAL TESTS: Reviewed   Progress Note    ==========Authored by:==========  Jessica Adam MD   PGY-1 Internal Medicine  Available on [employer] TEAMS    10-11-24 (3d)    Patient is a 69y old  Male who presents with a chief complaint of pneumonia (12 Oct 2024 07:41)      Interval/Overnight Events: KURT. Overnight pt desatted to 88-89% on RA, placed back on 4L NC. Negative urine legionella and strep pneumo. BCx NGTD x48 hrs.     Subjective: Patient seen and examined at bedside. Afebrile, VSS, on 3L NC satting mid 90s. Patient feeling a bit better. Still endorsing productive cough w/ yellow-green sputum and pleuritc CP. States LOMAX is improving, has been walking to bathroom and around unit. Denies fever, chills, SOB, palpitations, abdominal pain, N/V/D.       MEDICATIONS  (STANDING):  albuterol    0.083% 2.5 milliGRAM(s) Nebulizer every 6 hours  albuterol    90 MICROgram(s) HFA Inhaler 1 Puff(s) Inhalation every 4 hours  azithromycin   Tablet 500 milliGRAM(s) Oral every 24 hours  cefTRIAXone   IVPB 1000 milliGRAM(s) IV Intermittent every 24 hours  enoxaparin Injectable 40 milliGRAM(s) SubCutaneous every 24 hours  fluticasone propionate/ salmeterol 100-50 MICROgram(s) Diskus 1 Dose(s) Inhalation two times a day  influenza  Vaccine (HIGH DOSE) 0.5 milliLiter(s) IntraMuscular once  ketorolac   Injectable 15 milliGRAM(s) IV Push once  melatonin 5 milliGRAM(s) Oral at bedtime  nystatin Powder 1 Application(s) Topical two times a day    MEDICATIONS  (PRN):  acetaminophen     Tablet .. 650 milliGRAM(s) Oral every 6 hours PRN Temp greater or equal to 38C (100.4F), Mild Pain (1 - 3)  aluminum hydroxide/magnesium hydroxide/simethicone Suspension 30 milliLiter(s) Oral every 4 hours PRN Dyspepsia  melatonin 3 milliGRAM(s) Oral at bedtime PRN Insomnia  ondansetron Injectable 4 milliGRAM(s) IV Push every 8 hours PRN Nausea and/or Vomiting          PHYSICAL EXAM:  Vital Signs Last 24 Hrs  T(C): 36.7 (14 Oct 2024 04:03), Max: 37.4 (13 Oct 2024 12:00)  T(F): 98.1 (14 Oct 2024 04:03), Max: 99.3 (13 Oct 2024 12:00)  HR: 86 (14 Oct 2024 04:03) (86 - 99)  BP: 137/86 (14 Oct 2024 04:03) (116/75 - 155/90)  BP(mean): --  RR: 18 (14 Oct 2024 04:03) (18 - 18)  SpO2: 94% (14 Oct 2024 04:03) (88% - 94%)    Parameters below as of 14 Oct 2024 04:03  Patient On (Oxygen Delivery Method): nasal cannula        I&O's Summary    13 Oct 2024 07:01  -  14 Oct 2024 07:00  --------------------------------------------------------  IN: 240 mL / OUT: 0 mL / NET: 240 mL          PHYSICAL EXAM    GENERAL: NAD, non-toxic appearing  EYES: EOMI, PERRLA, conjunctiva and sclera clear  ENT: moist mucous membranes  CV: RRR, normal S1 and S2. No mumurs, rubs, or gallops. No JVD.   LUNGS: Clear to auscultation bilaterally. No rales, rhonchi, or wheezing.   ABDOMEN: Soft, nontender, nondistended, +bowel sounds   NEURO: AOx4, no FND  PSYCH: Appropriate affect  EXTREMITIES: No edema, cyanosis, or clubbing. 2+ peripheral pulses.  LYMPH: No lymphadenopathy noted  SKIN: No rashes or lesions        LABS:  CAPILLARY BLOOD GLUCOSE                                  13.2   10.15 )-----------( 255      ( 13 Oct 2024 06:20 )             40.1       WBC Trend: 10.15<--, 13.38<--, 17.46<--  Hb Trend: 13.2<--, 13.0<--, 14.5<--    10-13    136  |  98  |  24[H]  ----------------------------<  115[H]  3.9   |  22  |  0.70    Ca    9.7      13 Oct 2024 06:20  Phos  3.5     10-13  Mg     2.2     10-13            Urinalysis Basic - ( 13 Oct 2024 06:20 )    Color: x / Appearance: x / SG: x / pH: x  Gluc: 115 mg/dL / Ketone: x  / Bili: x / Urobili: x   Blood: x / Protein: x / Nitrite: x   Leuk Esterase: x / RBC: x / WBC x   Sq Epi: x / Non Sq Epi: x / Bacteria: x        Culture - Blood (collected 11 Oct 2024 17:20)  Source: .Blood BLOOD  Preliminary Report (13 Oct 2024 22:01):    No growth at 48 Hours    Culture - Blood (collected 11 Oct 2024 17:00)  Source: .Blood BLOOD  Preliminary Report (13 Oct 2024 22:01):    No growth at 48 Hours          RADIOLOGY & ADDITIONAL TESTS: Reviewed   Progress Note    ==========Authored by:==========  Jessica Adam MD   PGY-1 Internal Medicine  Available on [employer] TEAMS    10-11-24 (3d)    Patient is a 69y old  Male who presents with a chief complaint of pneumonia (12 Oct 2024 07:41)      Interval/Overnight Events: KURT. Overnight pt desatted to 88-89% on RA, placed back on 4L NC. Negative urine legionella and strep pneumo. BCx NGTD x48 hrs.     Subjective: Patient seen and examined at bedside. Afebrile, VSS, on 3L NC satting mid 90s. Patient feeling a bit better. Still endorsing productive cough w/ yellow-green sputum and pleuritic CP. States LOMAX is improving, has been walking to bathroom and around unit. Denies fever, chills, SOB, palpitations, abdominal pain, N/V/D.       MEDICATIONS  (STANDING):  albuterol    0.083% 2.5 milliGRAM(s) Nebulizer every 6 hours  albuterol    90 MICROgram(s) HFA Inhaler 1 Puff(s) Inhalation every 4 hours  azithromycin   Tablet 500 milliGRAM(s) Oral every 24 hours  cefTRIAXone   IVPB 1000 milliGRAM(s) IV Intermittent every 24 hours  enoxaparin Injectable 40 milliGRAM(s) SubCutaneous every 24 hours  fluticasone propionate/ salmeterol 100-50 MICROgram(s) Diskus 1 Dose(s) Inhalation two times a day  influenza  Vaccine (HIGH DOSE) 0.5 milliLiter(s) IntraMuscular once  ketorolac   Injectable 15 milliGRAM(s) IV Push once  melatonin 5 milliGRAM(s) Oral at bedtime  nystatin Powder 1 Application(s) Topical two times a day    MEDICATIONS  (PRN):  acetaminophen     Tablet .. 650 milliGRAM(s) Oral every 6 hours PRN Temp greater or equal to 38C (100.4F), Mild Pain (1 - 3)  aluminum hydroxide/magnesium hydroxide/simethicone Suspension 30 milliLiter(s) Oral every 4 hours PRN Dyspepsia  melatonin 3 milliGRAM(s) Oral at bedtime PRN Insomnia  ondansetron Injectable 4 milliGRAM(s) IV Push every 8 hours PRN Nausea and/or Vomiting          PHYSICAL EXAM:  Vital Signs Last 24 Hrs  T(C): 36.7 (14 Oct 2024 04:03), Max: 37.4 (13 Oct 2024 12:00)  T(F): 98.1 (14 Oct 2024 04:03), Max: 99.3 (13 Oct 2024 12:00)  HR: 86 (14 Oct 2024 04:03) (86 - 99)  BP: 137/86 (14 Oct 2024 04:03) (116/75 - 155/90)  BP(mean): --  RR: 18 (14 Oct 2024 04:03) (18 - 18)  SpO2: 94% (14 Oct 2024 04:03) (88% - 94%)    Parameters below as of 14 Oct 2024 04:03  Patient On (Oxygen Delivery Method): nasal cannula        I&O's Summary    13 Oct 2024 07:01  -  14 Oct 2024 07:00  --------------------------------------------------------  IN: 240 mL / OUT: 0 mL / NET: 240 mL          PHYSICAL EXAM    GENERAL: NAD, non-toxic appearing  EYES: EOMI, PERRLA, conjunctiva and sclera clear  ENT: moist mucous membranes  CV: RRR, normal S1 and S2. No mumurs, rubs, or gallops. No JVD.   LUNGS: Clear to auscultation bilaterally. No rales, rhonchi, or wheezing.   ABDOMEN: Soft, nontender, nondistended, +bowel sounds   NEURO: AOx4, no FND  PSYCH: Appropriate affect  EXTREMITIES: No edema, cyanosis, or clubbing. 2+ peripheral pulses.  LYMPH: No lymphadenopathy noted  SKIN: No rashes or lesions        LABS:  CAPILLARY BLOOD GLUCOSE                                  13.2   10.15 )-----------( 255      ( 13 Oct 2024 06:20 )             40.1       WBC Trend: 10.15<--, 13.38<--, 17.46<--  Hb Trend: 13.2<--, 13.0<--, 14.5<--    10-13    136  |  98  |  24[H]  ----------------------------<  115[H]  3.9   |  22  |  0.70    Ca    9.7      13 Oct 2024 06:20  Phos  3.5     10-13  Mg     2.2     10-13            Urinalysis Basic - ( 13 Oct 2024 06:20 )    Color: x / Appearance: x / SG: x / pH: x  Gluc: 115 mg/dL / Ketone: x  / Bili: x / Urobili: x   Blood: x / Protein: x / Nitrite: x   Leuk Esterase: x / RBC: x / WBC x   Sq Epi: x / Non Sq Epi: x / Bacteria: x        Culture - Blood (collected 11 Oct 2024 17:20)  Source: .Blood BLOOD  Preliminary Report (13 Oct 2024 22:01):    No growth at 48 Hours    Culture - Blood (collected 11 Oct 2024 17:00)  Source: .Blood BLOOD  Preliminary Report (13 Oct 2024 22:01):    No growth at 48 Hours          RADIOLOGY & ADDITIONAL TESTS: Reviewed

## 2024-10-14 NOTE — DISCHARGE NOTE PROVIDER - NSDCFUSCHEDAPPT_GEN_ALL_CORE_FT
Jillian Kaufman  [facility] Physician [facility]  [address]  Scheduled Appointment: 10/15/2024    Jillian Kaufman  [facility] Physician [facility]  OPHTHALM [address]  Scheduled Appointment: 11/18/2024    Jillian Kaufman  [facility] Physician [facility]  [address]ALM [address]  Scheduled Appointment: 11/19/2024    [facility] Physician [facility]  [address]ALM [address]  Scheduled Appointment: 12/18/2024    Tyson Erazo  [facility] Physician [facility]  [address]ALM [address]  Scheduled Appointment: 12/18/2024    Rolly Evans  [facility] Physician [facility]  OTOLARYNG [address] [place] [address]  Scheduled Appointment: 12/19/2024     Jillian Kaufman  [facility] Physician [facility]  OPHTHALM [address]  Scheduled Appointment: 11/18/2024    Jillian Kaufman  [facility] Physician [facility]  [address]THALM [address]  Scheduled Appointment: 11/19/2024    [facility]  OPHTHALM [address]  Scheduled Appointment: 12/18/2024    Tyson Erazo  [facility] Physician [facility]  [address]THALM [address]  Scheduled Appointment: 12/18/2024    Rolly Evans  [facility]  OTOLARYNG [address] [place] [address]  Scheduled Appointment: 12/19/2024

## 2024-10-14 NOTE — DISCHARGE NOTE PROVIDER - NSDCFUADDAPPT_GEN_ALL_CORE_FT
APPTS ARE READY TO BE MADE: [ ] YES    Best Family or Patient Contact (if needed):    Additional Information about above appointments (if needed):    1:   2:   3:     Other comments or requests:    APPTS ARE READY TO BE MADE: [x] YES    Best Family or Patient Contact (if needed):    Additional Information about above appointments (if needed):    1: Pulmonary in 1-2 weeks  2: Home at pulm in 1-3 days  3: PCP in 1 week    Other comments or requests:

## 2024-10-14 NOTE — SWALLOW VFSS/MBS ASSESSMENT ADULT - ASPIRATION PRECAUTIONS
Monitor for s/s aspiration/laryngeal penetration. If noted:  D/C p.o. intake, provide non-oral nutrition/hydration/meds, and contact this service @ [id number]

## 2024-10-14 NOTE — CONSULT NOTE ADULT - ASSESSMENT
69M PMHx laryngeal ca s/p radiation in remission, copd (on documentation), maritza not on cpap (on documentation). Presenting w/ subjective fever, cough, dyspnea x5d. Went to  today, found w/ o2 sat 88% and sent to ED.  In the ED, Tmax 100.3F, HRmax 109, BP stable, RRmax 22, on 3LNC saturating 93-94%. CBC w/ wbc 17.46, hgb 14.5, plt 257. CMP w/ Na 134, SCr 0.88, alk phos 137, ast and alt wnl. VBG w/ lactate 1.4. covid/rsv/flu neg. CXR w/ new opacity RLL, c/f pna. On my exam/interview, pt aaox3 and comfortable at rest with 2LNC. Pt ambulated to and from bathroom without oxygen and c/o dyspnea and pleuritic cp on right lower chest and o2 sat 84%.  (12 Oct 2024 01:53)  now pulm called:  he looks OK and feels better too : he has an extensive smoking history  now quit for many years:   since last to last sunday he started getting sick and now admitted with pneumonia few days ago :     Pneumonia:   COPD  maritza  Hx of laryngeal cancer    Pneumonia:   -his symptoms seems suggestive of pneumonia:  given his HIgh WBC count and clinical features and cxr findings   -he recently had ct scan chest about a month ago : and seemed fine with no metastatic disease:  it does not  make sense to repeat ct scan chest now when he is improving  -all his cultures have been negative:   symptomatically he feels better , but still little sob:   his VBG was excellent:      COPD  -cont BD:   start short course of steroids   40 mf a day for 4-5 days     maritza  -non compliant with CPAP    Hx of laryngeal cancer  -defer to pr nithya brito team :    [facility] team

## 2024-10-14 NOTE — DISCHARGE NOTE PROVIDER - NSDCMRMEDTOKEN_GEN_ALL_CORE_FT
LATANOPROST .005% OP SOL:   PREDNISOLONE AC 1% EYE DROP: INSTILL 1 DROP INTO LEFT EYE FOUR TIMES A DAY BEGIN DAY OF SURGERY   cefuroxime 500 mg oral tablet: 1 tab(s) orally 2 times a day Final day is 10/17  ipratropium-albuterol 0.5 mg-2.5 mg/3 mL inhalation solution: 3 milliliter(s) inhaled every 6 hours as needed for  bronchospasm  LATANOPROST .005% OP SOL:   Nebulizer: Fill per patient&#x27;s insurance  PREDNISOLONE AC 1% EYE DROP: INSTILL 1 DROP INTO LEFT EYE FOUR TIMES A DAY BEGIN DAY OF SURGERY  predniSONE 20 mg oral tablet: 2 tab(s) orally once a day Final day 10/18  Symbicort 80 mcg-4.5 mcg/inh inhalation aerosol: 2 puff(s) inhaled 2 times a day  tiotropium 2.5 mcg/inh inhalation aerosol: 2 puff(s) inhaled once a day   Advair Diskus 100 mcg-50 mcg inhalation powder: 1 inhaled 2 times a day  cefuroxime 500 mg oral tablet: 1 tab(s) orally 2 times a day Final day is 10/17  ipratropium-albuterol 0.5 mg-2.5 mg/3 mL inhalation solution: 3 milliliter(s) inhaled every 6 hours as needed for  bronchospasm  LATANOPROST .005% OP SOL:   Nebulizer: Fill per patient&#x27;s insurance  PREDNISOLONE AC 1% EYE DROP: INSTILL 1 DROP INTO LEFT EYE FOUR TIMES A DAY BEGIN DAY OF SURGERY  predniSONE 20 mg oral tablet: 2 tab(s) orally once a day Final day 10/18  tiotropium 2.5 mcg/inh inhalation aerosol: 2 puff(s) inhaled once a day   Advair Diskus 100 mcg-50 mcg inhalation powder: 1 inhaled 2 times a day  cefuroxime 500 mg oral tablet: 1 tab(s) orally 2 times a day Final day is 10/17  ipratropium-albuterol 0.5 mg-2.5 mg/3 mL inhalation solution: 3 milliliter(s) inhaled every 6 hours as needed for  bronchospasm  LATANOPROST .005% OP SOL: 1 application to each affected eye once a day (at bedtime) Instill 1 drop into both eyes every night  Nebulizer: Fill per patient&#x27;s insurance  PREDNISOLONE AC 1% EYE DROP: INSTILL 1 DROP INTO LEFT EYE FOUR TIMES A DAY BEGIN DAY OF SURGERY  predniSONE 20 mg oral tablet: 2 tab(s) orally once a day Final day 10/18  tiotropium 2.5 mcg/inh inhalation aerosol: 2 puff(s) inhaled once a day

## 2024-10-14 NOTE — PROGRESS NOTE ADULT - ASSESSMENT
68 yo M w/ PMHx laryngeal cancer s/p radiation, remission x5 years who presents w/ cough, fever, and dyspnea x5 days, found to have new RLL hazy opacity adm with sepsis and acute hypoxic respiratory failure 2/2 CAP PNA.

## 2024-10-14 NOTE — PROGRESS NOTE ADULT - PROBLEM SELECTOR PLAN 4
- VTE ppx = Lovenox    - Diet = easy-to-chew, thin liquids (f/u speech eval, reccs)  - Dispo = Pending wean off O2, anticipate DC in 1-2 days - VTE ppx = Lovenox    - Diet = Regular   - Dispo = Pending wean off O2, anticipate DC in 1-2 days

## 2024-10-14 NOTE — SWALLOW VFSS/MBS ASSESSMENT ADULT - SLP GENERAL OBSERVATIONS
Pt encountered in radiology, secure in BRIANNA chair. Pt awake and alert, cooperative, following directions for the purposes of the exam, on room air.

## 2024-10-14 NOTE — SWALLOW VFSS/MBS ASSESSMENT ADULT - LARYNGEAL PENETRATION DURING THE SWALLOW - SILENT
Trace; Shallow; Over the laryngeal surface of the epiglottis; During single straw sip; Trace amount of material remains, though clears w/ cued throat clear and repeat swallow.

## 2024-10-14 NOTE — DISCHARGE NOTE PROVIDER - NSFOLLOWUPCLINICS_GEN_ALL_ED_FT
[facility] General Internal Medicine  General Internal Medicine  [address] [place] [address]  Phone: [phone]  Fax:   Follow Up Time: 1 week    Home Program  Pulmonary  [place]   Phone:   Fax:   Follow Up Time: 1-3 days

## 2024-10-14 NOTE — DISCHARGE NOTE PROVIDER - NSDCCPCAREPLAN_GEN_ALL_CORE_FT
PRINCIPAL DISCHARGE DIAGNOSIS  Diagnosis: Pneumonia  Assessment and Plan of Treatment:       SECONDARY DISCHARGE DIAGNOSES  Diagnosis: Emphysema of lung  Assessment and Plan of Treatment:      PRINCIPAL DISCHARGE DIAGNOSIS  Diagnosis: Pneumonia  Assessment and Plan of Treatment: Pneumonia is a lung infection that causes inflammation and the buildup of mucus and fluids in the lungs. This may cause coughing and difficulty breathing. Community-acquired pneumonia is pneumonia that develops in people who are not, and have not recently been, in a hospital or other health care facility.  Usually, pneumonia develops as a result of an illness that is caused by a virus, such as the common cold and the flu (influenza). It can also be caused by bacteria or fungi. While the common cold and influenza can pass from person to person (are contagious), pneumonia itself is not considered contagious.  You were diagnosed with community-aquired pneumonia. You were treated with antibiotics (Azithroycin and Ceftriaxone), steroids (Prednisone), and nebulizer breathing treatments. Upon discharge, you will start taking antibiotics (Cefuroxime 500mg 2x/day) for 3 days with your last dose on 10/17/2024. You will also continue taking Prednisone 40mg/day for 4 days with your last dose on 10/18/2024. You will need to follow-up outpatient with Pulmonology. You will also need to follow-up outpatient with your PCP and have a repeat chest x-ray in 6-8 weeks to assess for resolution of the pneumonia.   Contact a health care provider if:  - You have a fever.  - You have trouble sleeping because you cannot control your cough with cough medicine.  Get help right away if:  - Your shortness of breath becomes worse.  - Your chest pain increases.  - Your sickness becomes worse, especially if you are an older adult or have a weak immune system.  - You cough up blood.      SECONDARY DISCHARGE DIAGNOSES  Diagnosis: Emphysema of lung  Assessment and Plan of Treatment:     Diagnosis: COPD (chronic obstructive pulmonary disease)  Assessment and Plan of Treatment: COPD (chronic obstructive pulmonary disease) is a lung disease that causes breathing problems. COPD usually develops from years of irritation and inflammation in your lungs. Obstructive means airflow is blocked. This limits airflow out of your lungs. When you have COPD, it can feel harder to breathe in or out. Smoking, breathing in pollution, genetics, or a history of lung infections can increase your risk for COPD.  You were treated with nebulizer treatments and Albuterol during your admission. You will start using Advair inhaler twice a day. You will also use nebulizer treatments (DuoNebs) every 6 hours as needed for shortness of breath. You will need to follow-up outpatient with Pulmonology.   Contact a health care provider if:  - You are coughing up more mucus than usual.  - There is a change in the color or thickness of the mucus.  - It is harder to breathe than usual or you are short of breath while you are resting.  - You need to use your quick relief inhaler more often.  - You have trouble doing your normal activities such as getting dressed or walking in the house.  - Your skin color or fingernails turn blue.  - You have a fever or chills.  Get help right away if:  - You are short of breath and cannot:           - Talk in full sentences.           - Do normal activities.  - You have chest pain.  - You feel confused.

## 2024-10-14 NOTE — ADVANCED PRACTICE NURSE CONSULT - REASON FOR CONSULT
Wound care consult initiated by RN to assess patient's skin for possible incontinence associated dermatitis present on admission     History of Present Illness:   69M PMHx laryngeal ca s/p radiation in remission, copd (on documentation), maritza not on cpap (on documentation).   Presenting w/ subjective fever, cough, dyspnea x5d. Went to  today, found w/ o2 sat 88% and sent to ED.      In the ED, Tmax 100.3F, HRmax 109, BP stable, RRmax 22, on 3LNC saturating 93-94%.     CBC w/ wbc 17.46, hgb 14.5, plt 257.   CMP w/ Na 134, SCr 0.88, alk phos 137, ast and alt wnl.   VBG w/ lactate 1.4.   covid/rsv/flu neg.   CXR w/ new opacity RLL, c/f pna.     On my exam/interview, pt aaox3 and comfortable at rest with 2LNC. Pt ambulated to and from bathroom without oxygen and c/o dyspnea and pleuritic cp on right lower chest and o2 sat 84%.

## 2024-10-14 NOTE — SWALLOW VFSS/MBS ASSESSMENT ADULT - ADDITIONAL INFORMATION
+combination of vascular calcifications and degenerative changes along cervical spine per d/w radiologist Dr. Kevin.

## 2024-10-14 NOTE — ADVANCED PRACTICE NURSE CONSULT - RECOMMEDATIONS
dictated Impression:     B/L buttocks/sacral hyperpigmentation, cannot rule out a deep tissue injury present on admission   B/L groin fungal dermatitis present on admission   gluteal cleft fungal dermatitis  moisture related dermatitis    Recommendations:     1) turn and position q2 and PRN utilizing offloading assistive devices  2) routine pericare daily and PRN soiling  3) encourage optimal nutrition  4) waffle cushion when oob to chair  5) B/L LE complete cair air fluidized boots or veena-lock pillow to offload heels/feet  6) julien antifungal protective barrier cream to B/L buttocks/sacrum/groin/gluteal cleft daily and PRN soiling  7) incontinence management - consider external urinary catheter to divert urine from skin if incontinent    Plan discussed with MELISA Luna on unit     For questions/comments regarding the recommendations in this consult, please contact Ludmila at [phone]. Wound care will not actively follow, please place future consults for new concerns. Thank you!

## 2024-10-14 NOTE — ADVANCED PRACTICE NURSE CONSULT - ASSESSMENT
Patient encountered on [address]. When wound care RN arrived on unit, patient was found lying in a low air loss pressure redistribution support surface style bed. Patient was alert and oriented and gave consent to skin consult. Mr Monroy is able to turn independently and staff assistance x 1 was provided as needed. Once turned, the wound care RN was able to visualize moist skin with the gluteal cleft as well as B/L groin. Patient reports periods of itching and burning at site. Erythema noted with white linda film on surface of skin within cleft and groin- consistent with fungal dermatitis. B/L buttocks/sacral skin and within gluteal cleft, erythematous and painful when palpated- unable to rule out a deep tissue injury at this time, area measures approximately 6cm x 3cm x 0cm. Once consult was complete, patient was educated regarding the need for routine turning and positioning to prevent pressure injuries.

## 2024-10-14 NOTE — DISCHARGE NOTE PROVIDER - CARE PROVIDER_API CALL
Yan Melo  Pulmonary Disease  [address] [place] [address]  Phone: [phone]  Fax: [phone]  Scheduled Appointment: 10/23/2024 08:30 AM

## 2024-10-15 ENCOUNTER — APPOINTMENT (OUTPATIENT)
Dept: OPHTHALMOLOGY | Facility: CLINIC | Age: 69
End: 2024-10-15

## 2024-10-15 ENCOUNTER — TRANSCRIPTION ENCOUNTER (OUTPATIENT)
Age: 69
End: 2024-10-15

## 2024-10-15 VITALS
RESPIRATION RATE: 18 BRPM | OXYGEN SATURATION: 95 % | SYSTOLIC BLOOD PRESSURE: 139 MMHG | DIASTOLIC BLOOD PRESSURE: 81 MMHG | HEART RATE: 85 BPM | TEMPERATURE: 98 F

## 2024-10-15 LAB
A1C WITH ESTIMATED AVERAGE GLUCOSE RESULT: 5.8 % — HIGH (ref 4–5.6)
ADD ON TEST-SPECIMEN IN LAB: SIGNIFICANT CHANGE UP
ALBUMIN SERPL ELPH-MCNC: 3.4 G/DL — SIGNIFICANT CHANGE UP (ref 3.3–5)
ALP SERPL-CCNC: 147 U/L — HIGH (ref 40–120)
ALT FLD-CCNC: 38 U/L — SIGNIFICANT CHANGE UP (ref 10–45)
ANION GAP SERPL CALC-SCNC: 14 MMOL/L — SIGNIFICANT CHANGE UP (ref 5–17)
AST SERPL-CCNC: 19 U/L — SIGNIFICANT CHANGE UP (ref 10–40)
BASOPHILS # BLD AUTO: 0.02 K/UL — SIGNIFICANT CHANGE UP (ref 0–0.2)
BASOPHILS NFR BLD AUTO: 0.3 % — SIGNIFICANT CHANGE UP (ref 0–2)
BILIRUB SERPL-MCNC: 0.2 MG/DL — SIGNIFICANT CHANGE UP (ref 0.2–1.2)
BUN SERPL-MCNC: 15 MG/DL — SIGNIFICANT CHANGE UP (ref 7–23)
CALCIUM SERPL-MCNC: 9.9 MG/DL — SIGNIFICANT CHANGE UP (ref 8.4–10.5)
CHLORIDE SERPL-SCNC: 102 MMOL/L — SIGNIFICANT CHANGE UP (ref 96–108)
CO2 SERPL-SCNC: 24 MMOL/L — SIGNIFICANT CHANGE UP (ref 22–31)
CREAT SERPL-MCNC: 0.62 MG/DL — SIGNIFICANT CHANGE UP (ref 0.5–1.3)
EGFR: 102 ML/MIN/1.73M2 — SIGNIFICANT CHANGE UP
EOSINOPHIL # BLD AUTO: 0 K/UL — SIGNIFICANT CHANGE UP (ref 0–0.5)
EOSINOPHIL NFR BLD AUTO: 0 % — SIGNIFICANT CHANGE UP (ref 0–6)
ESTIMATED AVERAGE GLUCOSE: 120 MG/DL — HIGH (ref 68–114)
GAS PNL BLDV: SIGNIFICANT CHANGE UP
GLUCOSE SERPL-MCNC: 140 MG/DL — HIGH (ref 70–99)
HCT VFR BLD CALC: 40.8 % — SIGNIFICANT CHANGE UP (ref 39–50)
HGB BLD-MCNC: 13.5 G/DL — SIGNIFICANT CHANGE UP (ref 13–17)
IMM GRANULOCYTES NFR BLD AUTO: 0.6 % — SIGNIFICANT CHANGE UP (ref 0–0.9)
LACTATE SERPL-SCNC: 3.2 MMOL/L — HIGH (ref 0.5–2)
LYMPHOCYTES # BLD AUTO: 1.04 K/UL — SIGNIFICANT CHANGE UP (ref 1–3.3)
LYMPHOCYTES # BLD AUTO: 13.2 % — SIGNIFICANT CHANGE UP (ref 13–44)
MAGNESIUM SERPL-MCNC: 2.2 MG/DL — SIGNIFICANT CHANGE UP (ref 1.6–2.6)
MCHC RBC-ENTMCNC: 32.8 PG — SIGNIFICANT CHANGE UP (ref 27–34)
MCHC RBC-ENTMCNC: 33.1 GM/DL — SIGNIFICANT CHANGE UP (ref 32–36)
MCV RBC AUTO: 99 FL — SIGNIFICANT CHANGE UP (ref 80–100)
MONOCYTES # BLD AUTO: 0.52 K/UL — SIGNIFICANT CHANGE UP (ref 0–0.9)
MONOCYTES NFR BLD AUTO: 6.6 % — SIGNIFICANT CHANGE UP (ref 2–14)
NEUTROPHILS # BLD AUTO: 6.27 K/UL — SIGNIFICANT CHANGE UP (ref 1.8–7.4)
NEUTROPHILS NFR BLD AUTO: 79.3 % — HIGH (ref 43–77)
NRBC # BLD: 0 /100 WBCS — SIGNIFICANT CHANGE UP (ref 0–0)
PHOSPHATE SERPL-MCNC: 3.7 MG/DL — SIGNIFICANT CHANGE UP (ref 2.5–4.5)
PLATELET # BLD AUTO: 330 K/UL — SIGNIFICANT CHANGE UP (ref 150–400)
POTASSIUM SERPL-MCNC: 4.3 MMOL/L — SIGNIFICANT CHANGE UP (ref 3.5–5.3)
POTASSIUM SERPL-SCNC: 4.3 MMOL/L — SIGNIFICANT CHANGE UP (ref 3.5–5.3)
PROT SERPL-MCNC: 7.4 G/DL — SIGNIFICANT CHANGE UP (ref 6–8.3)
RBC # BLD: 4.12 M/UL — LOW (ref 4.2–5.8)
RBC # FLD: 11.9 % — SIGNIFICANT CHANGE UP (ref 10.3–14.5)
SODIUM SERPL-SCNC: 140 MMOL/L — SIGNIFICANT CHANGE UP (ref 135–145)
WBC # BLD: 7.9 K/UL — SIGNIFICANT CHANGE UP (ref 3.8–10.5)
WBC # FLD AUTO: 7.9 K/UL — SIGNIFICANT CHANGE UP (ref 3.8–10.5)

## 2024-10-15 PROCEDURE — 96375 TX/PRO/DX INJ NEW DRUG ADDON: CPT

## 2024-10-15 PROCEDURE — 0241U: CPT

## 2024-10-15 PROCEDURE — 82803 BLOOD GASES ANY COMBINATION: CPT

## 2024-10-15 PROCEDURE — 83735 ASSAY OF MAGNESIUM: CPT

## 2024-10-15 PROCEDURE — 87640 STAPH A DNA AMP PROBE: CPT

## 2024-10-15 PROCEDURE — 82947 ASSAY GLUCOSE BLOOD QUANT: CPT

## 2024-10-15 PROCEDURE — 85027 COMPLETE CBC AUTOMATED: CPT

## 2024-10-15 PROCEDURE — 82435 ASSAY OF BLOOD CHLORIDE: CPT

## 2024-10-15 PROCEDURE — 71046 X-RAY EXAM CHEST 2 VIEWS: CPT

## 2024-10-15 PROCEDURE — 84100 ASSAY OF PHOSPHORUS: CPT

## 2024-10-15 PROCEDURE — 87205 SMEAR GRAM STAIN: CPT

## 2024-10-15 PROCEDURE — 74230 X-RAY XM SWLNG FUNCJ C+: CPT

## 2024-10-15 PROCEDURE — 87070 CULTURE OTHR SPECIMN AEROBIC: CPT

## 2024-10-15 PROCEDURE — 93005 ELECTROCARDIOGRAM TRACING: CPT

## 2024-10-15 PROCEDURE — 83036 HEMOGLOBIN GLYCOSYLATED A1C: CPT

## 2024-10-15 PROCEDURE — 85014 HEMATOCRIT: CPT

## 2024-10-15 PROCEDURE — 99285 EMERGENCY DEPT VISIT HI MDM: CPT

## 2024-10-15 PROCEDURE — 36415 COLL VENOUS BLD VENIPUNCTURE: CPT

## 2024-10-15 PROCEDURE — 82330 ASSAY OF CALCIUM: CPT

## 2024-10-15 PROCEDURE — 87449 NOS EACH ORGANISM AG IA: CPT

## 2024-10-15 PROCEDURE — 94640 AIRWAY INHALATION TREATMENT: CPT

## 2024-10-15 PROCEDURE — 92526 ORAL FUNCTION THERAPY: CPT

## 2024-10-15 PROCEDURE — 87040 BLOOD CULTURE FOR BACTERIA: CPT

## 2024-10-15 PROCEDURE — 87641 MR-STAPH DNA AMP PROBE: CPT

## 2024-10-15 PROCEDURE — 84132 ASSAY OF SERUM POTASSIUM: CPT

## 2024-10-15 PROCEDURE — 85025 COMPLETE CBC W/AUTO DIFF WBC: CPT

## 2024-10-15 PROCEDURE — 84295 ASSAY OF SERUM SODIUM: CPT

## 2024-10-15 PROCEDURE — 80048 BASIC METABOLIC PNL TOTAL CA: CPT

## 2024-10-15 PROCEDURE — 99239 HOSP IP/OBS DSCHRG MGMT >30: CPT

## 2024-10-15 PROCEDURE — 96374 THER/PROPH/DIAG INJ IV PUSH: CPT

## 2024-10-15 PROCEDURE — 83605 ASSAY OF LACTIC ACID: CPT

## 2024-10-15 PROCEDURE — 80053 COMPREHEN METABOLIC PANEL: CPT

## 2024-10-15 PROCEDURE — 87637 SARSCOV2&INF A&B&RSV AMP PRB: CPT

## 2024-10-15 PROCEDURE — 92611 MOTION FLUOROSCOPY/SWALLOW: CPT

## 2024-10-15 PROCEDURE — 85018 HEMOGLOBIN: CPT

## 2024-10-15 PROCEDURE — 87899 AGENT NOS ASSAY W/OPTIC: CPT

## 2024-10-15 PROCEDURE — 92610 EVALUATE SWALLOWING FUNCTION: CPT

## 2024-10-15 RX ORDER — SODIUM CHLORIDE IRRIG SOLUTION 0.9 %
500 SOLUTION, IRRIGATION IRRIGATION
Refills: 0 | Status: DISCONTINUED | OUTPATIENT
Start: 2024-10-15 | End: 2024-10-15

## 2024-10-15 RX ORDER — FLUTICASONE PROPION/SALMETEROL 100-50 MCG
1 BLISTER, WITH INHALATION DEVICE INHALATION
Qty: 4 | Refills: 0
Start: 2024-10-15 | End: 2025-01-12

## 2024-10-15 RX ORDER — FLUTICASONE PROPION/SALMETEROL 100-50 MCG
1 BLISTER, WITH INHALATION DEVICE INHALATION
Qty: 4 | Refills: 0
Start: 2024-10-15 | End: 2025-01-13

## 2024-10-15 RX ORDER — IPRATROPIUM BROMIDE AND ALBUTEROL SULFATE .5; 3 MG/3ML; MG/3ML
3 SOLUTION RESPIRATORY (INHALATION)
Qty: 10 | Refills: 0
Start: 2024-10-15 | End: 2024-11-14

## 2024-10-15 RX ORDER — IPRATROPIUM BROMIDE AND ALBUTEROL SULFATE .5; 3 MG/3ML; MG/3ML
3 SOLUTION RESPIRATORY (INHALATION)
Qty: 360 | Refills: 0
Start: 2024-10-15 | End: 2024-11-13

## 2024-10-15 RX ORDER — PREDNISONE 5 MG/1
2 TABLET ORAL
Qty: 8 | Refills: 0
Start: 2024-10-15 | End: 2024-10-18

## 2024-10-15 RX ORDER — TIOTROPIUM BROMIDE INHALATION SPRAY 3.12 UG/1
2 SPRAY, METERED RESPIRATORY (INHALATION)
Qty: 3 | Refills: 0
Start: 2024-10-15 | End: 2025-01-12

## 2024-10-15 RX ORDER — LATANOPROST 50 UG/ML
1 SOLUTION OPHTHALMIC
Qty: 0 | Refills: 0 | DISCHARGE

## 2024-10-15 RX ORDER — SODIUM CHLORIDE IRRIG SOLUTION 0.9 %
500 SOLUTION, IRRIGATION IRRIGATION ONCE
Refills: 0 | Status: COMPLETED | OUTPATIENT
Start: 2024-10-15 | End: 2024-10-15

## 2024-10-15 RX ORDER — BUDESONIDE AND FORMOTEROL FUMARATE DIHYDRATE 80; 4.5 UG/1; UG/1
2 AEROSOL RESPIRATORY (INHALATION)
Qty: 5 | Refills: 0
Start: 2024-10-15 | End: 2025-01-12

## 2024-10-15 RX ADMIN — Medication 250 MILLILITER(S): at 09:31

## 2024-10-15 RX ADMIN — IPRATROPIUM BROMIDE AND ALBUTEROL SULFATE 3 MILLILITER(S): .5; 3 SOLUTION RESPIRATORY (INHALATION) at 12:59

## 2024-10-15 RX ADMIN — PREDNISONE 40 MILLIGRAM(S): 5 TABLET ORAL at 05:39

## 2024-10-15 RX ADMIN — TIOTROPIUM BROMIDE INHALATION SPRAY 2 PUFF(S): 3.12 SPRAY, METERED RESPIRATORY (INHALATION) at 12:56

## 2024-10-15 RX ADMIN — Medication 100 MILLILITER(S): at 08:47

## 2024-10-15 RX ADMIN — IPRATROPIUM BROMIDE AND ALBUTEROL SULFATE 3 MILLILITER(S): .5; 3 SOLUTION RESPIRATORY (INHALATION) at 02:28

## 2024-10-15 RX ADMIN — Medication 1 DOSE(S): at 05:37

## 2024-10-15 RX ADMIN — NYSTATIN 1 APPLICATION(S): 100000 POWDER TOPICAL at 05:38

## 2024-10-15 RX ADMIN — ENOXAPARIN SODIUM 40 MILLIGRAM(S): 150 INJECTION SUBCUTANEOUS at 05:39

## 2024-10-15 RX ADMIN — IPRATROPIUM BROMIDE AND ALBUTEROL SULFATE 3 MILLILITER(S): .5; 3 SOLUTION RESPIRATORY (INHALATION) at 05:36

## 2024-10-15 RX ADMIN — GUAIFENESIN 600 MILLIGRAM(S): 100 SOLUTION ORAL at 05:39

## 2024-10-15 NOTE — PROGRESS NOTE ADULT - SUBJECTIVE AND OBJECTIVE BOX
Progress Note    ==========Authored by:==========  Jessica Adam MD   PGY-1 Internal Medicine  Available on [employer] TEAMS    10-11-24 (4d)    Patient is a 69y old  Male who presents with a chief complaint of Pneumonia (14 Oct 2024 14:48)      Interval/Overnight Events: KURT.     Subjective: Patient seen and examined at bedside.       MEDICATIONS  (STANDING):  albuterol/ipratropium for Nebulization 3 milliLiter(s) Nebulizer every 6 hours  cefTRIAXone   IVPB 1000 milliGRAM(s) IV Intermittent every 24 hours  enoxaparin Injectable 40 milliGRAM(s) SubCutaneous every 24 hours  fluticasone propionate/ salmeterol 100-50 MICROgram(s) Diskus 1 Dose(s) Inhalation two times a day  guaiFENesin  milliGRAM(s) Oral every 12 hours  influenza  Vaccine (HIGH DOSE) 0.5 milliLiter(s) IntraMuscular once  ketorolac   Injectable 15 milliGRAM(s) IV Push once  melatonin 5 milliGRAM(s) Oral at bedtime  nystatin Powder 1 Application(s) Topical two times a day  predniSONE   Tablet 40 milliGRAM(s) Oral daily  tiotropium 2.5 MICROgram(s) Inhaler 2 Puff(s) Inhalation daily    MEDICATIONS  (PRN):  acetaminophen     Tablet .. 650 milliGRAM(s) Oral every 6 hours PRN Temp greater or equal to 38C (100.4F), Mild Pain (1 - 3)  aluminum hydroxide/magnesium hydroxide/simethicone Suspension 30 milliLiter(s) Oral every 4 hours PRN Dyspepsia  melatonin 3 milliGRAM(s) Oral at bedtime PRN Insomnia  ondansetron Injectable 4 milliGRAM(s) IV Push every 8 hours PRN Nausea and/or Vomiting          PHYSICAL EXAM:  Vital Signs Last 24 Hrs  T(C): 36.6 (15 Oct 2024 04:45), Max: 36.8 (14 Oct 2024 12:00)  T(F): 97.9 (15 Oct 2024 04:45), Max: 98.3 (14 Oct 2024 12:00)  HR: 75 (15 Oct 2024 04:45) (75 - 85)  BP: 130/83 (15 Oct 2024 04:45) (130/83 - 136/87)  BP(mean): --  RR: 18 (15 Oct 2024 04:45) (16 - 18)  SpO2: 93% (15 Oct 2024 04:45) (89% - 93%)    Parameters below as of 15 Oct 2024 04:45  Patient On (Oxygen Delivery Method): room air        I&O's Summary    14 Oct 2024 07:01  -  15 Oct 2024 07:00  --------------------------------------------------------  IN: 400 mL / OUT: 0 mL / NET: 400 mL          PHYSICAL EXAM    GENERAL: NAD, non-toxic appearing  EYES: EOMI, PERRLA, conjunctiva and sclera clear  ENT: moist mucous membranes  CV: RRR, normal S1 and S2. No mumurs, rubs, or gallops. No JVD.   LUNGS: Clear to auscultation bilaterally. No rales, rhonchi, or wheezing.   ABDOMEN: Soft, nontender, nondistended, +bowel sounds   NEURO: AOx4, no FND  PSYCH: Appropriate affect  EXTREMITIES: No edema, cyanosis, or clubbing. 2+ peripheral pulses.  LYMPH: No lymphadenopathy noted  SKIN: No rashes or lesions        LABS:  CAPILLARY BLOOD GLUCOSE                                  13.5   7.90  )-----------( 330      ( 15 Oct 2024 06:49 )             40.8       WBC Trend: 7.90<--, 8.29<--, 10.15<--  Hb Trend: 13.5<--, 12.4<--, 13.2<--, 13.0<--, 14.5<--    10-14    133[L]  |  97  |  13  ----------------------------<  151[H]  3.8   |  24  |  0.60    Ca    9.4      14 Oct 2024 07:06  Phos  3.1     10-14  Mg     2.1     10-14            Urinalysis Basic - ( 14 Oct 2024 07:06 )    Color: x / Appearance: x / SG: x / pH: x  Gluc: 151 mg/dL / Ketone: x  / Bili: x / Urobili: x   Blood: x / Protein: x / Nitrite: x   Leuk Esterase: x / RBC: x / WBC x   Sq Epi: x / Non Sq Epi: x / Bacteria: x        Culture - Sputum (collected 14 Oct 2024 12:29)  Source: .Sputum Sputum  Gram Stain (14 Oct 2024 21:42):    Moderate polymorphonuclear leukocytes per low power field    Rare Squamous epithelial cells per low power field    Few Gram positive cocci in pairs per oil power field    Rare Gram Positive Rods per oil power field          RADIOLOGY & ADDITIONAL TESTS: Reviewed   Progress Note    ==========Authored by:==========  Jessica Adam MD   PGY-1 Internal Medicine  Available on [employer] TEAMS    10-11-24 (4d)    Patient is a 69y old  Male who presents with a chief complaint of Pneumonia (14 Oct 2024 14:48)      Interval/Overnight Events: KURT.     Subjective: Patient seen and examined at bedside. Afebrile, VSS, on RA. Feeling well overall, eager to be discharged. Endorsing improved productive cough and LOMAX. No new complaints.       MEDICATIONS  (STANDING):  albuterol/ipratropium for Nebulization 3 milliLiter(s) Nebulizer every 6 hours  cefTRIAXone   IVPB 1000 milliGRAM(s) IV Intermittent every 24 hours  enoxaparin Injectable 40 milliGRAM(s) SubCutaneous every 24 hours  fluticasone propionate/ salmeterol 100-50 MICROgram(s) Diskus 1 Dose(s) Inhalation two times a day  guaiFENesin  milliGRAM(s) Oral every 12 hours  influenza  Vaccine (HIGH DOSE) 0.5 milliLiter(s) IntraMuscular once  ketorolac   Injectable 15 milliGRAM(s) IV Push once  melatonin 5 milliGRAM(s) Oral at bedtime  nystatin Powder 1 Application(s) Topical two times a day  predniSONE   Tablet 40 milliGRAM(s) Oral daily  tiotropium 2.5 MICROgram(s) Inhaler 2 Puff(s) Inhalation daily    MEDICATIONS  (PRN):  acetaminophen     Tablet .. 650 milliGRAM(s) Oral every 6 hours PRN Temp greater or equal to 38C (100.4F), Mild Pain (1 - 3)  aluminum hydroxide/magnesium hydroxide/simethicone Suspension 30 milliLiter(s) Oral every 4 hours PRN Dyspepsia  melatonin 3 milliGRAM(s) Oral at bedtime PRN Insomnia  ondansetron Injectable 4 milliGRAM(s) IV Push every 8 hours PRN Nausea and/or Vomiting          PHYSICAL EXAM:  Vital Signs Last 24 Hrs  T(C): 36.6 (15 Oct 2024 04:45), Max: 36.8 (14 Oct 2024 12:00)  T(F): 97.9 (15 Oct 2024 04:45), Max: 98.3 (14 Oct 2024 12:00)  HR: 75 (15 Oct 2024 04:45) (75 - 85)  BP: 130/83 (15 Oct 2024 04:45) (130/83 - 136/87)  BP(mean): --  RR: 18 (15 Oct 2024 04:45) (16 - 18)  SpO2: 93% (15 Oct 2024 04:45) (89% - 93%)    Parameters below as of 15 Oct 2024 04:45  Patient On (Oxygen Delivery Method): room air        I&O's Summary    14 Oct 2024 07:01  -  15 Oct 2024 07:00  --------------------------------------------------------  IN: 400 mL / OUT: 0 mL / NET: 400 mL          PHYSICAL EXAM    GENERAL: NAD, non-toxic appearing  EYES: EOMI, PERRLA, conjunctiva and sclera clear  ENT: moist mucous membranes  CV: RRR, normal S1 and S2. No murmurs, rubs, or gallops. No JVD.   LUNGS: Decreased bibasilar breath sounds, perla RLL. No rales, rhonchi, or wheezing.   ABDOMEN: Soft, nontender, nondistended, +bowel sounds   NEURO: AOx4, no FND  PSYCH: Appropriate affect  EXTREMITIES: No edema, cyanosis, or clubbing  SKIN: No rashes or lesions        LABS:  CAPILLARY BLOOD GLUCOSE                                  13.5   7.90  )-----------( 330      ( 15 Oct 2024 06:49 )             40.8       WBC Trend: 7.90<--, 8.29<--, 10.15<--  Hb Trend: 13.5<--, 12.4<--, 13.2<--, 13.0<--, 14.5<--    10-14    133[L]  |  97  |  13  ----------------------------<  151[H]  3.8   |  24  |  0.60    Ca    9.4      14 Oct 2024 07:06  Phos  3.1     10-14  Mg     2.1     10-14            Urinalysis Basic - ( 14 Oct 2024 07:06 )    Color: x / Appearance: x / SG: x / pH: x  Gluc: 151 mg/dL / Ketone: x  / Bili: x / Urobili: x   Blood: x / Protein: x / Nitrite: x   Leuk Esterase: x / RBC: x / WBC x   Sq Epi: x / Non Sq Epi: x / Bacteria: x        Culture - Sputum (collected 14 Oct 2024 12:29)  Source: .Sputum Sputum  Gram Stain (14 Oct 2024 21:42):    Moderate polymorphonuclear leukocytes per low power field    Rare Squamous epithelial cells per low power field    Few Gram positive cocci in pairs per oil power field    Rare Gram Positive Rods per oil power field          RADIOLOGY & ADDITIONAL TESTS: Reviewed   Progress Note    ==========Authored by:==========  Jessica Adam MD   PGY-1 Internal Medicine  Available on [employer] TEAMS    10-11-24 (4d)    Patient is a 69y old  Male who presents with a chief complaint of Pneumonia (14 Oct 2024 14:48)      Interval/Overnight Events: KURT. Lactate 2.3 this AM, 500cc bolus LR ordered.     Subjective: Patient seen and examined at bedside. Afebrile, VSS, on RA. Feeling well overall, eager to be discharged. Endorsing improved productive cough and LOMAX. No new complaints.       MEDICATIONS  (STANDING):  albuterol/ipratropium for Nebulization 3 milliLiter(s) Nebulizer every 6 hours  cefTRIAXone   IVPB 1000 milliGRAM(s) IV Intermittent every 24 hours  enoxaparin Injectable 40 milliGRAM(s) SubCutaneous every 24 hours  fluticasone propionate/ salmeterol 100-50 MICROgram(s) Diskus 1 Dose(s) Inhalation two times a day  guaiFENesin  milliGRAM(s) Oral every 12 hours  influenza  Vaccine (HIGH DOSE) 0.5 milliLiter(s) IntraMuscular once  ketorolac   Injectable 15 milliGRAM(s) IV Push once  melatonin 5 milliGRAM(s) Oral at bedtime  nystatin Powder 1 Application(s) Topical two times a day  predniSONE   Tablet 40 milliGRAM(s) Oral daily  tiotropium 2.5 MICROgram(s) Inhaler 2 Puff(s) Inhalation daily    MEDICATIONS  (PRN):  acetaminophen     Tablet .. 650 milliGRAM(s) Oral every 6 hours PRN Temp greater or equal to 38C (100.4F), Mild Pain (1 - 3)  aluminum hydroxide/magnesium hydroxide/simethicone Suspension 30 milliLiter(s) Oral every 4 hours PRN Dyspepsia  melatonin 3 milliGRAM(s) Oral at bedtime PRN Insomnia  ondansetron Injectable 4 milliGRAM(s) IV Push every 8 hours PRN Nausea and/or Vomiting          PHYSICAL EXAM:  Vital Signs Last 24 Hrs  T(C): 36.6 (15 Oct 2024 04:45), Max: 36.8 (14 Oct 2024 12:00)  T(F): 97.9 (15 Oct 2024 04:45), Max: 98.3 (14 Oct 2024 12:00)  HR: 75 (15 Oct 2024 04:45) (75 - 85)  BP: 130/83 (15 Oct 2024 04:45) (130/83 - 136/87)  BP(mean): --  RR: 18 (15 Oct 2024 04:45) (16 - 18)  SpO2: 93% (15 Oct 2024 04:45) (89% - 93%)    Parameters below as of 15 Oct 2024 04:45  Patient On (Oxygen Delivery Method): room air        I&O's Summary    14 Oct 2024 07:01  -  15 Oct 2024 07:00  --------------------------------------------------------  IN: 400 mL / OUT: 0 mL / NET: 400 mL          PHYSICAL EXAM    GENERAL: NAD, non-toxic appearing  EYES: EOMI, PERRLA, conjunctiva and sclera clear  ENT: moist mucous membranes  CV: RRR, normal S1 and S2. No murmurs, rubs, or gallops. No JVD.   LUNGS: Decreased bibasilar breath sounds, perla RLL. No rales, rhonchi, or wheezing.   ABDOMEN: Soft, nontender, nondistended, +bowel sounds   NEURO: AOx4, no FND  PSYCH: Appropriate affect  EXTREMITIES: No edema, cyanosis, or clubbing  SKIN: No rashes or lesions        LABS:  CAPILLARY BLOOD GLUCOSE                                  13.5   7.90  )-----------( 330      ( 15 Oct 2024 06:49 )             40.8       WBC Trend: 7.90<--, 8.29<--, 10.15<--  Hb Trend: 13.5<--, 12.4<--, 13.2<--, 13.0<--, 14.5<--    10-14    133[L]  |  97  |  13  ----------------------------<  151[H]  3.8   |  24  |  0.60    Ca    9.4      14 Oct 2024 07:06  Phos  3.1     10-14  Mg     2.1     10-14            Urinalysis Basic - ( 14 Oct 2024 07:06 )    Color: x / Appearance: x / SG: x / pH: x  Gluc: 151 mg/dL / Ketone: x  / Bili: x / Urobili: x   Blood: x / Protein: x / Nitrite: x   Leuk Esterase: x / RBC: x / WBC x   Sq Epi: x / Non Sq Epi: x / Bacteria: x        Culture - Sputum (collected 14 Oct 2024 12:29)  Source: .Sputum Sputum  Gram Stain (14 Oct 2024 21:42):    Moderate polymorphonuclear leukocytes per low power field    Rare Squamous epithelial cells per low power field    Few Gram positive cocci in pairs per oil power field    Rare Gram Positive Rods per oil power field          RADIOLOGY & ADDITIONAL TESTS: Reviewed   Progress Note    ==========Authored by:==========  Jessica Adam MD   PGY-1 Internal Medicine  Available on [employer] TEAMS    10-11-24 (4d)    Patient is a 69y old  Male who presents with a chief complaint of Pneumonia (14 Oct 2024 14:48)      Interval/Overnight Events: KURT. Pulm consulted, started on PO Prednisone 40mg QD x5days. Lactate 2.3 this AM, 500cc bolus LR ordered.    Subjective: Patient seen and examined at bedside. Afebrile, VSS, on RA. Feeling well overall, eager to be discharged. Endorsing improved productive cough and LOMAX. No new complaints.       MEDICATIONS  (STANDING):  albuterol/ipratropium for Nebulization 3 milliLiter(s) Nebulizer every 6 hours  cefTRIAXone   IVPB 1000 milliGRAM(s) IV Intermittent every 24 hours  enoxaparin Injectable 40 milliGRAM(s) SubCutaneous every 24 hours  fluticasone propionate/ salmeterol 100-50 MICROgram(s) Diskus 1 Dose(s) Inhalation two times a day  guaiFENesin  milliGRAM(s) Oral every 12 hours  influenza  Vaccine (HIGH DOSE) 0.5 milliLiter(s) IntraMuscular once  ketorolac   Injectable 15 milliGRAM(s) IV Push once  melatonin 5 milliGRAM(s) Oral at bedtime  nystatin Powder 1 Application(s) Topical two times a day  predniSONE   Tablet 40 milliGRAM(s) Oral daily  tiotropium 2.5 MICROgram(s) Inhaler 2 Puff(s) Inhalation daily    MEDICATIONS  (PRN):  acetaminophen     Tablet .. 650 milliGRAM(s) Oral every 6 hours PRN Temp greater or equal to 38C (100.4F), Mild Pain (1 - 3)  aluminum hydroxide/magnesium hydroxide/simethicone Suspension 30 milliLiter(s) Oral every 4 hours PRN Dyspepsia  melatonin 3 milliGRAM(s) Oral at bedtime PRN Insomnia  ondansetron Injectable 4 milliGRAM(s) IV Push every 8 hours PRN Nausea and/or Vomiting          PHYSICAL EXAM:  Vital Signs Last 24 Hrs  T(C): 36.6 (15 Oct 2024 04:45), Max: 36.8 (14 Oct 2024 12:00)  T(F): 97.9 (15 Oct 2024 04:45), Max: 98.3 (14 Oct 2024 12:00)  HR: 75 (15 Oct 2024 04:45) (75 - 85)  BP: 130/83 (15 Oct 2024 04:45) (130/83 - 136/87)  BP(mean): --  RR: 18 (15 Oct 2024 04:45) (16 - 18)  SpO2: 93% (15 Oct 2024 04:45) (89% - 93%)    Parameters below as of 15 Oct 2024 04:45  Patient On (Oxygen Delivery Method): room air        I&O's Summary    14 Oct 2024 07:01  -  15 Oct 2024 07:00  --------------------------------------------------------  IN: 400 mL / OUT: 0 mL / NET: 400 mL          PHYSICAL EXAM    GENERAL: NAD, non-toxic appearing  EYES: EOMI, PERRLA, conjunctiva and sclera clear  ENT: moist mucous membranes  CV: RRR, normal S1 and S2. No murmurs, rubs, or gallops. No JVD.   LUNGS: Decreased bibasilar breath sounds, perla RLL. No rales, rhonchi, or wheezing.   ABDOMEN: Soft, nontender, nondistended, +bowel sounds   NEURO: AOx4, no FND  PSYCH: Appropriate affect  EXTREMITIES: No edema, cyanosis, or clubbing  SKIN: No rashes or lesions        LABS:  CAPILLARY BLOOD GLUCOSE                                  13.5   7.90  )-----------( 330      ( 15 Oct 2024 06:49 )             40.8       WBC Trend: 7.90<--, 8.29<--, 10.15<--  Hb Trend: 13.5<--, 12.4<--, 13.2<--, 13.0<--, 14.5<--    10-14    133[L]  |  97  |  13  ----------------------------<  151[H]  3.8   |  24  |  0.60    Ca    9.4      14 Oct 2024 07:06  Phos  3.1     10-14  Mg     2.1     10-14            Urinalysis Basic - ( 14 Oct 2024 07:06 )    Color: x / Appearance: x / SG: x / pH: x  Gluc: 151 mg/dL / Ketone: x  / Bili: x / Urobili: x   Blood: x / Protein: x / Nitrite: x   Leuk Esterase: x / RBC: x / WBC x   Sq Epi: x / Non Sq Epi: x / Bacteria: x        Culture - Sputum (collected 14 Oct 2024 12:29)  Source: .Sputum Sputum  Gram Stain (14 Oct 2024 21:42):    Moderate polymorphonuclear leukocytes per low power field    Rare Squamous epithelial cells per low power field    Few Gram positive cocci in pairs per oil power field    Rare Gram Positive Rods per oil power field          RADIOLOGY & ADDITIONAL TESTS: Reviewed

## 2024-10-15 NOTE — DISCHARGE NOTE NURSING/CASE MANAGEMENT/SOCIAL WORK - PATIENT PORTAL LINK FT
You can access the FollowMyHealth Patient Portal offered by [facility] by registering at the following website: [email or web address]. By joining [employer]’s FollowMyHealth portal, you will also be able to view your health information using other applications (apps) compatible with our system.

## 2024-10-15 NOTE — PROGRESS NOTE ADULT - CONVERSATION DETAILS
We discussed his pneumonia and treatment of it and he agreed to take the antibiotics and steroids as prescribed. He will also continue the nebulizer and inhalers. Advised him to follow up with pulmonary outpatient.

## 2024-10-15 NOTE — PROGRESS NOTE ADULT - PROBLEM SELECTOR PLAN 3
- med rec with patient: reports he's only on eye drops which was rx'd by his ophthalmologist for the cataracts. Pt declined to have any eyedrops ordered while he's admitted. Discussed risks and benefits but pt is firm on his decision tonight re: not taking eye drops while admitted. Pt advised NOT to use his own eye drops while admitted. Pt expressed understanding.

## 2024-10-15 NOTE — PROGRESS NOTE ADULT - ATTENDING COMMENTS
Seen at bedside with wife, looks sob while talking while wearing 2L O2 satting low 90s. Pt has 50 PYH smoking hx likely has underlying COPD.  sepsis 2/2 CAP with acute hypoxic respiratory failure  - wean down O2 as tolerates  - start advair and duoneb q6h atc  - ambulate prn  - c/w ceft, azithro for CAP  - f/u s&s recs  - f/u bld cx ngtd thus far  - DC planning home once weaned off O2. otherwise will need to set up home O2 on 10/14 for COPD
-Admitted with RLL PNA and subsequent acute hypoxic respiratory failure in setting of h/o emphysema on imaging; prior smoking hx. -RLL coarse wet crackles on exam.   -Leukocytosis resolved on IV CTX/azithro. -S/p 3 days of azithro. -C/w CTX; would convert to oral cephalosporin for DCP total 7 days for CAP.   -Pulm appreciated; c/w inhalers. Add prednisone 40mg po x 5 days. Will need outpatient pulm f/u.   -Wean O2 as tolerated. Check ambulatory O2 sat. Patient says he feels better overall compared to admission.   -Mucinex and incentive spirometer and Acapella for secretions/sputum clearance. Duonebs helping.   -D/w patient to consider Pneumonia vaccine as outpatient.   -Patient has eye drops from home with him for recent cataract surgery.   -Plan discussed with patient and his wife at bedside.   -Plan discussed with house staff.
Seen at bedside with son, pt reports sob somewhat improved, still on 1-2 L O2.  sepsis 2/2 CAP with acute hypoxic respiratory failure  - wean down 1-2 L O2  - ambulate prn  - c/w ceft, azithro for CAP  - f/u s&s recs  - f/u bld cx results   - DC planning home once weaned off O2 and pending neg bld cx results
-Patient feels better. Breathing and cough better.   -Pulm appreciated; steroids x 5 days total. -C/w inhalers.   -O2 sats on RA maintaining above 90. It should improve as his PNA continues to improve. Leukocytosis resolved.    -Transition CTX to oral abx Ceftin for DC for total 7 day course of abx. -S/p 3 days of azithro.   -Stable/improved for DC today with close outpatient PMD/pulm follow up. -F/u with PMD outpatient for comprehensive primary care. -Home pulm referral.   -Plan discussed with house staff. -35 minutes spent on the DC process.

## 2024-10-15 NOTE — PROGRESS NOTE ADULT - ASSESSMENT
69M PMHx laryngeal ca s/p radiation in remission, copd (on documentation), maritza not on cpap (on documentation). Presenting w/ subjective fever, cough, dyspnea x5d. Went to  today, found w/ o2 sat 88% and sent to ED.  In the ED, Tmax 100.3F, HRmax 109, BP stable, RRmax 22, on 3LNC saturating 93-94%. CBC w/ wbc 17.46, hgb 14.5, plt 257. CMP w/ Na 134, SCr 0.88, alk phos 137, ast and alt wnl. VBG w/ lactate 1.4. covid/rsv/flu neg. CXR w/ new opacity RLL, c/f pna. On my exam/interview, pt aaox3 and comfortable at rest with 2LNC. Pt ambulated to and from bathroom without oxygen and c/o dyspnea and pleuritic cp on right lower chest and o2 sat 84%.  (12 Oct 2024 01:53)  now pulm called:  he looks OK and feels better too : he has an extensive smoking history  now quit for many years:   since last to last sunday he started getting sick and now admitted with pneumonia few days ago :     Pneumonia:   COPD  maritza  Hx of laryngeal cancer    Pneumonia:   -his symptoms seems suggestive of pneumonia:  given his HIgh WBC count and clinical features and cxr findings   -he recently had ct scan chest about a month ago : and seemed fine with no metastatic disease:  it does not  make sense to repeat ct scan chest now when he is improving  -all his cultures have been negative:   symptomatically he feels better , but still little sob:   -his VBG was excellent:   10/15: -on ceftriaxone  -on room air:   -cont steroids and BD     -finish 7 days of antibiotics  -legionella is negative     COPD  -cont BD:   start short course of steroids   40 mf a day for 4-5 days   10/15: cont current rx:     maritza  -non compliant with CPAP    Hx of laryngeal cancer  -defer to pr nithya [facility] team :    [facility]

## 2024-10-15 NOTE — PROGRESS NOTE ADULT - PROBLEM SELECTOR PLAN 4
- VTE ppx = Lovenox    - Diet = Regular   - Dispo = Pending wean off O2, anticipate DC in 1-2 days - VTE ppx = Lovenox    - Diet = Regular   - Dispo = Home, likely today

## 2024-10-15 NOTE — PROGRESS NOTE ADULT - PROBLEM SELECTOR PLAN 1
- Met sepsis criteria with WBCs 17.56, RR 22, also almost febrile w/ tmax 100.3F   - Negative COVID/flu/RSV, legionella, strep pneumo, MRSA PCR  - BCx NGTD x48 hrs   - CXR (10/11): new hazy RLL opacity c/f PNA   - Given location of PNA, concern for aspiration though more likely CAP    PLAN:  - c/w Azithromycin and Ceftriaxone (10/12-  - c/w supplemental O2, currently on 3L NC, and obtain ambulatory O2 sat  - Wean supplemental O2 as tolerated.  - c/w Advair and Albuterol q6h (pt had hx smoking, evidence of emphysema on CT chest from 9/2024)   - Tylenol 650 q6 PRN for fever  - Monitor fever curve, WBCs   - Aspiration precautions  - Pulm consulted, f/u reccs - Met sepsis criteria with WBCs 17.56, RR 22, also almost febrile w/ tmax 100.3F   - Negative COVID/flu/RSV, legionella, strep pneumo, MRSA PCR  - BCx NGTD x72 hrs   - CXR (10/11): new hazy RLL opacity c/f PNA   - Given location of PNA, concern for aspiration though more likely CAP    PLAN:  - c/w Azithromycin and Ceftriaxone (10/12-  - c/w Advair and Albuterol q6h (pt had hx smoking, evidence of emphysema on CT chest from 9/2024)   - Now on RA; Obtain ambulatory O2 sat  - Tylenol 650 q6 PRN for fever  - Monitor fever curve, WBCs   - Aspiration precautions  - Pulm consulted, f/u reccs - Met sepsis criteria with WBCs 17.56, RR 22, also almost febrile w/ tmax 100.3F   - Negative COVID/flu/RSV, legionella, strep pneumo, MRSA PCR  - BCx NGTD x72 hrs   - CXR (10/11): new hazy RLL opacity c/f PNA   - Given location of PNA, concern for aspiration though more likely CAP    PLAN:  - c/w Azithromycin and Ceftriaxone (10/12-  - c/w Advair and Albuterol q6h (pt had hx smoking, evidence of emphysema on CT chest from 9/2024)   - LR 500cc bolus iso elevated lactate   - Now on RA; Obtain ambulatory O2 sat  - Tylenol 650 q6 PRN for fever  - Monitor fever curve, WBCs   - Aspiration precautions  - Pulm consulted, f/u reccs - Met sepsis criteria with WBCs 17.56, RR 22, also almost febrile w/ tmax 100.3F   - Negative COVID/flu/RSV, legionella, strep pneumo, MRSA PCR  - BCx NGTD x72 hrs   - CXR (10/11): new hazy RLL opacity c/f PNA   - Given location of PNA, concern for aspiration though more likely CAP    PLAN:  - c/w Azithromycin and Ceftriaxone (10/12-  - c/w PO Prednisone 40mg QD x5 days (10/14-  - c/w Advair and Albuterol q6h (pt had hx smoking, evidence of emphysema on CT chest from 9/2024)   - LR 500cc bolus iso elevated lactate   - Now on RA; ambulatory O2 sat 89% on RA  - Tylenol 650 q6 PRN for fever  - Monitor fever curve, WBCs   - Aspiration precautions  - Pulm consulted, f/u reccs  - Plan for outpatient follow-up with Pulm and repeat CXR in 6-8 weeks to assess for resolution of PNA - Met sepsis criteria with WBCs 17.56, RR 22, also almost febrile w/ tmax 100.3F   - Negative COVID/flu/RSV, legionella, strep pneumo, MRSA PCR  - BCx NGTD x72 hrs   - CXR (10/11): new hazy RLL opacity c/f PNA   - Given location of PNA, concern for aspiration though more likely CAP    PLAN:  - c/w Azithromycin and Ceftriaxone (10/12-  - c/w PO Prednisone 40mg QD x5 days (10/14-  - c/w Advair and Albuterol q6h (pt had hx smoking, evidence of emphysema on CT chest 9/2024)   - LR 500cc bolus iso elevated lactate   - Now on RA; ambulatory O2 sat 89% on RA  - Tylenol 650 q6 PRN for fever  - Monitor fever curve, WBCs   - Aspiration precautions  - Pulm consulted, f/u reccs  - Plan for OP follow-up with Pulm and repeat CXR in 6-8 weeks to assess for resolution of PNA

## 2024-10-15 NOTE — PROGRESS NOTE ADULT - PROBLEM SELECTOR PLAN 2
- Pt w/ Hx laryngeal cancer s/p radiation to neck, now had dysphagia due to radiation changes  - Pt reports he had swallow eval study previously and recommended to take small bites and drink w/ straw  - 10/14 Speech and swallow evaluation: mild oropharyngeal dysphagia likely acute on chronic in nature, though functional for a regular diet. No aspiration.     PLAN:  - Regular diet  - Aspiration precaution - Pt w/ Hx laryngeal cancer s/p radiation to neck, now had dysphagia due to radiation changes  - Pt reports he had swallow eval study previously and recommended to take small bites and drink w/ straw  - Speech & swallow eval (10/14): mild oropharyngeal dysphagia likely acute on chronic in nature, though functional for a regular diet. No aspiration.   - Cinesophagram (10/14) without aspiration     PLAN:  - Regular diet  - Aspiration precautions

## 2024-10-15 NOTE — DISCHARGE NOTE NURSING/CASE MANAGEMENT/SOCIAL WORK - NSDCFUADDAPPT_GEN_ALL_CORE_FT
APPTS ARE READY TO BE MADE: [x] YES    Best Family or Patient Contact (if needed):    Additional Information about above appointments (if needed):    1: Pulmonary in 1-2 weeks  2: Home at pulm in 1-3 days  3: PCP in 1 week    Other comments or requests:    Refused

## 2024-10-15 NOTE — PROGRESS NOTE ADULT - SUBJECTIVE AND OBJECTIVE BOX
Date of Service: 10-15-24 @ 15:17    Patient is a 69y old  Male who presents with a chief complaint of pneumonia (15 Oct 2024 07:23)      Any change in ROS: seems OK:  no sob:  no cough : no phlegm     MEDICATIONS  (STANDING):  albuterol/ipratropium for Nebulization 3 milliLiter(s) Nebulizer every 6 hours  cefTRIAXone   IVPB 1000 milliGRAM(s) IV Intermittent every 24 hours  enoxaparin Injectable 40 milliGRAM(s) SubCutaneous every 24 hours  fluticasone propionate/ salmeterol 100-50 MICROgram(s) Diskus 1 Dose(s) Inhalation two times a day  guaiFENesin  milliGRAM(s) Oral every 12 hours  ketorolac   Injectable 15 milliGRAM(s) IV Push once  melatonin 5 milliGRAM(s) Oral at bedtime  nystatin Powder 1 Application(s) Topical two times a day  predniSONE   Tablet 40 milliGRAM(s) Oral daily  tiotropium 2.5 MICROgram(s) Inhaler 2 Puff(s) Inhalation daily    MEDICATIONS  (PRN):  acetaminophen     Tablet .. 650 milliGRAM(s) Oral every 6 hours PRN Temp greater or equal to 38C (100.4F), Mild Pain (1 - 3)  aluminum hydroxide/magnesium hydroxide/simethicone Suspension 30 milliLiter(s) Oral every 4 hours PRN Dyspepsia  melatonin 3 milliGRAM(s) Oral at bedtime PRN Insomnia  ondansetron Injectable 4 milliGRAM(s) IV Push every 8 hours PRN Nausea and/or Vomiting    Vital Signs Last 24 Hrs  T(C): 36.5 (15 Oct 2024 13:27), Max: 36.6 (15 Oct 2024 04:45)  T(F): 97.7 (15 Oct 2024 13:27), Max: 97.9 (15 Oct 2024 04:45)  HR: 85 (15 Oct 2024 13:27) (75 - 85)  BP: 139/81 (15 Oct 2024 13:27) (130/83 - 139/81)  BP(mean): --  RR: 18 (15 Oct 2024 13:27) (16 - 18)  SpO2: 95% (15 Oct 2024 13:27) (90% - 95%)    Parameters below as of 15 Oct 2024 13:27  Patient On (Oxygen Delivery Method): room air        I&O's Summary    14 Oct 2024 07:01  -  15 Oct 2024 07:00  --------------------------------------------------------  IN: 400 mL / OUT: 0 mL / NET: 400 mL          Physical Exam:   GENERAL: NAD, well-groomed, well-developed  HEENT: LAXMI/   Atraumatic, Normocephalic  ENMT: No tonsillar erythema, exudates, or enlargement; Moist mucous membranes, Good dentition, No lesions  NECK: Supple, No JVD, Normal thyroid  CHEST/LUNG: poor air entry   CVS: Regular rate and rhythm; No murmurs, rubs, or gallops  GI: : Soft, Nontender, Nondistended; Bowel sounds present  NERVOUS SYSTEM:  Alert & Oriented X3  EXTREMITIES: -edema  LYMPH: No lymphadenopathy noted  SKIN: No rashes or lesions  ENDOCRINOLOGY: No Thyromegaly  PSYCH: Appropriate    Labs:  29, 27                            13.5   7.90  )-----------( 330      ( 15 Oct 2024 06:49 )             40.8                         12.4   8.29  )-----------( 276      ( 14 Oct 2024 07:07 )             37.6                         13.2   10.15 )-----------( 255      ( 13 Oct 2024 06:20 )             40.1                         13.0   13.38 )-----------( 233      ( 12 Oct 2024 07:22 )             39.5                         14.5   17.46 )-----------( 257      ( 11 Oct 2024 16:33 )             43.3     10-15    140  |  102  |  15  ----------------------------<  140[H]  4.3   |  24  |  0.62  10-14    133[L]  |  97  |  13  ----------------------------<  151[H]  3.8   |  24  |  0.60  10-13    136  |  98  |  24[H]  ----------------------------<  115[H]  3.9   |  22  |  0.70  10-12    134[L]  |  98  |  19  ----------------------------<  136[H]  3.8   |  23  |  0.71  10-11    134[L]  |  97  |  22  ----------------------------<  128[H]  3.9   |  22  |  0.88    Ca    9.9      15 Oct 2024 06:49  Ca    9.4      14 Oct 2024 07:06  Phos  3.7     10-15  Phos  3.1     10-14  Mg     2.2     10-15  Mg     2.1     10-14    TPro  7.4  /  Alb  3.4  /  TBili  0.2  /  DBili  x   /  AST  19  /  ALT  38  /  AlkPhos  147[H]  10-15  TPro  8.1  /  Alb  3.9  /  TBili  0.6  /  DBili  x   /  AST  29  /  ALT  45  /  AlkPhos  137[H]  10-11    CAPILLARY BLOOD GLUCOSE          LIVER FUNCTIONS - ( 15 Oct 2024 06:49 )  Alb: 3.4 g/dL / Pro: 7.4 g/dL / ALK PHOS: 147 U/L / ALT: 38 U/L / AST: 19 U/L / GGT: x             Urinalysis Basic - ( 15 Oct 2024 06:49 )    Color: x / Appearance: x / SG: x / pH: x  Gluc: 140 mg/dL / Ketone: x  / Bili: x / Urobili: x   Blood: x / Protein: x / Nitrite: x   Leuk Esterase: x / RBC: x / WBC x   Sq Epi: x / Non Sq Epi: x / Bacteria: x      Lactate, Blood: 3.2 mmol/L (10-15 @ 12:40)        RECENT CULTURES:  10-14 @ 12:29 .Sputum Sputum       Moderate polymorphonuclear leukocytes per low power field  Rare Squamous epithelial cells per low power field  Few Gram positive cocci in pairs per oil power field  Rare Gram Positive Rods per oil power field           Commensal iain consistent with body site    10-11 @ 17:20 .Blood BLOOD                No growth at 72 Hours    10-11 @ 17:00 .Blood BLOOD       rad< from: Xray Chest 2 Views PA/Lat (10.11.24 @ 16:45) >    ACC: [id number] EXAM:  XR CHEST PA LAT 2V   ORDERED BY:  YOSSI CASTANO     PROCEDURE DATE:  10/11/2024          INTERPRETATION:  CLINICAL INDICATION: Cough.    EXAM: Two views of the chest.    COMPARISON: Chest radiograph from 5/20/2024    FINDINGS:  New hazy opacity in the right lower lung field.  No pleural effusion. No pneumothorax.  The heart is normal in size  The visualized osseous structures demonstrate no acute pathology.    IMPRESSION:  New hazy opacity in the right lower lung field, suspicious for pneumonia.    --- End of Report ---          HUGO GAMBLE MD; Resident Radiologist  This document has been electronically signed.  JINNY SYED MD; Attending Radiologist  This document has been electronically signed. Oct 11 2024 11:37PM    < end of copied text >  < from: CT Neck Soft Tissue w/ IV Cont (09.04.24 @ 10:11) >  findings regarding the thoracic structures.    Osseous structures: No fracture, dislocation or destructive lesion.   Severe degenerative changes of the cervical spine with reversal of the   cervical lordosis. Mild retrolisthesis C3 on C4.      IMPRESSION:    No CT evidence for recurrent disease.  No cervical adenopathy by imaging size criteria.    --- End of Report ---             VERONIKA WILD MD; Resident Radiologist  This document has been electronically signed.  MICHELLE REGAN MD; Attending Radiologist   This document has been electronically signed. Sep  4 2024  4:38PM    < end of copied text >           No growth at 72 Hours          RESPIRATORY CULTURES:          Studies  Chest X-RAY  CT SCAN Chest   Venous Dopplers: LE:   CT Abdomen  Others

## 2024-10-15 NOTE — PROVIDER CONTACT NOTE (CRITICAL VALUE NOTIFICATION) - ASSESSMENT
Received asleep, rousable, becoming alert and oriented in no acute distress. Denies pain or SOB.
Pt. has no sign and symptom of bleeding.
Pt. has no sign and symptom of bleeding.

## 2024-10-16 LAB
CULTURE RESULTS: ABNORMAL
CULTURE RESULTS: SIGNIFICANT CHANGE UP
CULTURE RESULTS: SIGNIFICANT CHANGE UP
SPECIMEN SOURCE: SIGNIFICANT CHANGE UP

## 2024-10-18 ENCOUNTER — TRANSCRIPTION ENCOUNTER (OUTPATIENT)
Age: 69
End: 2024-10-18

## 2024-10-18 PROBLEM — Z86.69 PERSONAL HISTORY OF OTHER DISEASES OF THE NERVOUS SYSTEM AND SENSE ORGANS: Chronic | Status: ACTIVE | Noted: 2024-10-12

## 2024-10-21 ENCOUNTER — TRANSCRIPTION ENCOUNTER (OUTPATIENT)
Age: 69
End: 2024-10-21

## 2024-10-29 ENCOUNTER — TRANSCRIPTION ENCOUNTER (OUTPATIENT)
Age: 69
End: 2024-10-29

## 2024-10-31 ENCOUNTER — APPOINTMENT (OUTPATIENT)
Dept: OPHTHALMOLOGY | Facility: CLINIC | Age: 69
End: 2024-10-31

## 2024-10-31 ENCOUNTER — NON-APPOINTMENT (OUTPATIENT)
Age: 69
End: 2024-10-31

## 2024-10-31 PROCEDURE — 92134 CPTRZ OPH DX IMG PST SGM RTA: CPT

## 2024-10-31 PROCEDURE — 99024 POSTOP FOLLOW-UP VISIT: CPT

## 2024-11-01 NOTE — REASON FOR VISIT
[Head and Neck Cancer] : head and neck cancer [Post-Treatment Evaluation] : post-treatment evaluation for - patient will receive >80% of nutritional needs from enteral nutrition regimen w/ good tolerance

## 2024-11-06 ENCOUNTER — TRANSCRIPTION ENCOUNTER (OUTPATIENT)
Age: 69
End: 2024-11-06

## 2024-11-07 ENCOUNTER — APPOINTMENT (OUTPATIENT)
Dept: OPHTHALMOLOGY | Facility: CLINIC | Age: 69
End: 2024-11-07
Payer: MEDICARE

## 2024-11-07 ENCOUNTER — NON-APPOINTMENT (OUTPATIENT)
Age: 69
End: 2024-11-07

## 2024-11-07 PROCEDURE — 99024 POSTOP FOLLOW-UP VISIT: CPT

## 2024-11-18 ENCOUNTER — APPOINTMENT (OUTPATIENT)
Dept: OPHTHALMOLOGY | Facility: AMBULATORY SURGERY CENTER | Age: 69
End: 2024-11-18
Payer: MEDICARE

## 2024-11-18 PROCEDURE — 66984 XCAPSL CTRC RMVL W/O ECP: CPT | Mod: RT,79

## 2024-11-19 ENCOUNTER — APPOINTMENT (OUTPATIENT)
Dept: OPHTHALMOLOGY | Facility: CLINIC | Age: 69
End: 2024-11-19
Payer: MEDICARE

## 2024-11-19 ENCOUNTER — NON-APPOINTMENT (OUTPATIENT)
Age: 69
End: 2024-11-19

## 2024-11-19 PROCEDURE — 99024 POSTOP FOLLOW-UP VISIT: CPT

## 2024-11-20 NOTE — H&P ADULT - NSTOBACCOSCREENHP_GEN_A_NCS
Post Acute Skilled Nursing Home Initial Visit Note     Date of Service: 11/20/2024  Location seen at: [facility]    PCP: Jorge Kilgore MD   Patient Care Team:  Jorge Kilgore MD as PCP - General (Internal Medicine)  Syed Goel MD (Orthopaedic Surgery - Foot & Ankle Surgery)  Vernon Mercedes MD (Internal Medicine - Pulmonary Disease)  Georginarenata Herminia CLOUD DPM (Podiatry - Foot & Ankle Surgery)  Fabi Kam, RN as Ambulatory Care Manager  (Registered Nurse)  Bijan Osorio DO as Post Acute Facility Provider: Physician (Family Practice)  Néstor Montiel CNP as Post Acute Facility Provider: APC (Nurse Practitioner - Family)  Seen by Bijan Osorio DO today    History of Present Illness: Daniella Ramesh is a 88 year old female presenting to Post Acute Skilled Nursing for: mirella    This is an 78-year-old female with past medical history of COPD, hypertension was recent hospitalized at [facility] from 11/11/2024 until 11/15/2024 for acute on chronic CHF.  During course patient hospitalization she was in consultation by cardiology patient was diuresed IV Lasix echo was completed which showed ejection fraction 65% with moderate mitral valve regurgitation and pacemaker leads were in the right ventricle.  Additionally nephrology was consulted due to elevated creatinine.  Additionally patient upper respiratory panel which was positive for parainfluenza 4 pulmonology was consulted patient treated with DuoNebs antibiotics IV steroids.  Patient was able to wean off of supplemental oxygen.  Patient returned to [facility] 11/18/2024 secondary to a fall weakness patient was started on ceftriaxone for urinary tract infection was subsequent transferred to this facility for medical management treatment..     HISTORY  Past Medical History:   Diagnosis Date    AF (atrial fibrillation)  (CMD)     Bronchitis     CHF (congestive heart failure)  (CMD)     Chronic  kidney disease     COPD (chronic obstructive pulmonary disease)  (CMD)     Emphysema lung  (CMD)     Hypertension     Hypotension     Pneumonia     Spinal stenosis       reports that she has quit smoking. Her smoking use included cigarettes. She has a 65 pack-year smoking history. She has never been exposed to tobacco smoke. She has never used smokeless tobacco. She reports that she does not currently use alcohol. She reports that she does not use drugs.  Past Surgical History:   Procedure Laterality Date    Ectopic pregnancy surgery      Extracapsular cataract removal w insert io lens prosth wo ecp      Pacemaker implant      Tonsillectomy       Family History   Problem Relation Age of Onset    Cancer Mother     Cancer Father         renal    Diabetes Brother     Heart disease Brother      History       Not marked as reviewed during this visit.            PROBLEM LIST:  Patient Active Problem List   Diagnosis    Spinal stenosis of lumbar region    Sciatica of left side    Abnormal CT scan of lung    Hyperlipidemia    Lung nodule    Essential hypertension    Left lumbar radiculitis    Unilateral primary osteoarthritis, left knee    Urge incontinence of urine    Nocturia    Chronic heart failure with preserved ejection fraction  (CMD)    Chronic obstructive pulmonary disease  (CMD)    Stage 3 chronic kidney disease  (CMD)    Atherosclerosis of abdominal aorta (CMD)    Benign paroxysmal positional vertigo of right ear    Pulmonary hypertension  (CMD)    Asymptomatic varicose veins    Bilateral lower extremity edema    Blood pressure check    Dizziness    Impacted cerumen of left ear    Lumbar radiculopathy    Atrial fibrillation with rapid ventricular response  (CMD)    Atrial fibrillation with RVR  (CMD)    Acute on chronic congestive heart failure, unspecified heart failure type  (CMD)    CHF (congestive heart failure), NYHA class I, acute on chronic, combined  (CMD)    Syncope and collapse       ADVANCE CARE  PLANNING:  Advance Care Planning: POLST  A voluntary discussion with RN, NP and Physician was obtained face to face with patient and/or POA regarding wishes regarding defining, clarifying and documenting patients decision.   Among the point of discussion were choices of FULL DNR,  FULL CODE, or Limited to which choices include yes or no answers to 1) Intubation and mechanical ventilaton,  2) electrical cardioversion, 3) IV pressors for hypotension and 4) IV antiarrthmics for malignant arrhythmia's.   NG and Gastric feeding tubes may have been discussed  in addition to identification of POA if not already done in addition to the completion of POLST form when necessary  Total time of bedside discussion was in excess of 20 minutes, and based the discussion an order was placed.        DEPRESSION SCREENING:  Recent PHQ 2/9 Score    PHQ 2:  PHQ 2 Score Adult PHQ 2 Score Adult PHQ 2 Interpretation Little interest or pleasure in activity?   11/19/2024   1:45 PM 0 No further screening needed 0       PHQ 9:       DEPRESSION ASSESSMENT/PLAN:  PAN and CAV Depression Follow-up 2024: Depression Screening performed. Screening time with patient was 14 minutes.    ALLERGIES:  Allergies as of 11/20/2024 - Reviewed 11/19/2024   Allergen Reaction Noted    Sulfa antibiotics RASH 06/16/2009       CURRENT MEDICATIONS:   Current Outpatient Medications   Medication Sig Dispense Refill    cefUROXime (CEFTIN) 500 MG tablet Take 1 tablet by mouth in the morning and 1 tablet in the evening. Do all this for 7 days. 14 tablet 0    torsemide (DEMADEX) 10 MG tablet Take 1 tablet by mouth 2 times daily. 60 tablet 0    benzonatate (TESSALON PERLES) 100 MG capsule Take 1 capsule by mouth 3 times daily as needed for Cough. 30 capsule 0    guaiFENesin-DM (MUCINEX DM)  MG per 12 hr tablet Take 2 tablets by mouth every 12 hours.      magnesium oxide (MAG-OX) 400 (240 Mg) MG tablet Take 1 tablet by mouth daily. 30 tablet 0    potassium CHLORIDE  (KLOR-CON M) 20 MEQ airanne ER tablet Take 1 tablet by mouth daily (with dinner). 30 tablet 0    digoxin (LANOXIN) 0.125 MG tablet Take 125 mcg by mouth every evening.      acetaminophen (TYLENOL) 500 MG tablet Take 500 mg by mouth every 4 hours as needed for Pain.      albuterol 108 (90 Base) MCG/ACT inhaler Inhale 2 puffs into the lungs every 4 hours as needed for Shortness of Breath or Wheezing. 1 each 0    apixaBAN (ELIQUIS) 2.5 MG Tab Take 1 tablet by mouth every 12 hours. 180 tablet 3    AMIODarone (PACERONE) 200 MG tablet Take 0.5 tablets by mouth daily. Indications: Atrial Fibrillation      formoterol (PERFOROMIST) 20 MCG/2ML inhalation solution Take 20 mcg by nebulization in the morning and 20 mcg in the evening.      budesonide (PULMICORT) 0.25 MG/2ML nebulizer suspension Take 0.25 mg by nebulization 2 times daily.       No current facility-administered medications for this visit.     Medications reviewed / reconciled: Yes    BASELINE FUNCTIONAL STATUS:  Independent    CURRENT FUNCTIONAL STATUS:  Weight bearing as tolerated (WBAT)    DIET:  Appetite: Fair    REVIEW OF SYSTEMS:  Review of Systems   Constitutional:  Positive for activity change and fatigue. Negative for chills, diaphoresis and fever.   HENT:  Negative for congestion, ear pain, hearing loss, rhinorrhea, sinus pressure, sinus pain, sneezing and sore throat.    Eyes:  Negative for photophobia and visual disturbance.   Respiratory:  Negative for cough, shortness of breath and wheezing.    Cardiovascular:  Negative for chest pain, palpitations and leg swelling.   Gastrointestinal:  Negative for abdominal distention and abdominal pain.   Endocrine: Negative for polyuria.   Genitourinary:  Negative for difficulty urinating, frequency and urgency.   Musculoskeletal:  Negative for arthralgias, gait problem and neck pain.   Skin:  Negative for color change and rash.   Neurological:  Negative for dizziness, seizures, weakness, light-headedness, numbness  and headaches.   Psychiatric/Behavioral:  Negative for agitation and behavioral problems.        VITALS:  There were no vitals taken for this visit.    PAIN SCORE:  There were no vitals filed for this visit.    PHYSICAL ASSESSMENT:  Physical Exam  Vitals and nursing note reviewed.   Constitutional:       General: She is not in acute distress.     Appearance: Normal appearance. She is well-developed. She is not diaphoretic.   HENT:      Head: Normocephalic and atraumatic.      Nose: Nose normal.      Mouth/Throat:      Pharynx: No oropharyngeal exudate.      Neck: Normal range of motion and neck supple.   Eyes:      General: No scleral icterus.        Left eye: No discharge.      Conjunctiva/sclera: Conjunctivae normal.      Pupils: Pupils are equal, round, and reactive to light.   Neck:      Thyroid: No thyromegaly.      Vascular: No JVD.      Trachea: No tracheal deviation.   Cardiovascular:      Rate and Rhythm: Normal rate and regular rhythm.      Heart sounds: Normal heart sounds. No murmur heard.     No friction rub. No gallop.   Pulmonary:      Effort: Pulmonary effort is normal. No respiratory distress.      Breath sounds: No wheezing or rales.      Comments: Diminished in bases  Chest:      Chest wall: No tenderness.   Abdominal:      General: Bowel sounds are normal. There is no distension.      Palpations: Abdomen is soft. There is no mass.      Tenderness: There is no abdominal tenderness. There is no guarding or rebound.   Musculoskeletal:         General: No tenderness or deformity. Normal range of motion.   Lymphadenopathy:      Cervical: No cervical adenopathy.   Skin:     General: Skin is warm and dry.      Coloration: Skin is not pale.      Findings: No erythema or rash.   Neurological:      Mental Status: She is alert and oriented to person, place, and time.      Cranial Nerves: No cranial nerve deficit.      Coordination: Coordination normal.      Deep Tendon Reflexes: Reflexes are normal and  symmetric. Reflexes normal.   Psychiatric:         Mood and Affect: Mood normal.         Behavior: Behavior normal.         Thought Content: Thought content normal.         Judgment: Judgment normal.         LABS:  CBC:   WBC (K/mcL)   Date Value   11/18/2024 15.3 (H)     RBC (mil/mcL)   Date Value   11/18/2024 4.26     HGB (g/dL)   Date Value   11/18/2024 14.1     PLT (K/mcL)   Date Value   11/18/2024 283    and BMP:   Sodium (mmol/L)   Date Value   11/18/2024 139     Potassium (mmol/L)   Date Value   11/18/2024 3.8     Chloride (mmol/L)   Date Value   11/18/2024 101     Glucose (mg/dL)   Date Value   11/18/2024 132 (H)     Calcium (mg/dL)   Date Value   11/18/2024 9.1     Carbon Dioxide (mmol/L)   Date Value   11/18/2024 37 (H)     BUN (mg/dL)   Date Value   11/18/2024 63 (H)     Creatinine (mg/dL)   Date Value   11/18/2024 1.46 (H)       Patient has not had an A1C test in the last 6 months.  Last Lab A1C:  Hemoglobin A1C (%)   Date Value   07/18/2022 5.5         ASSESSMENT AND PLAN  UTI seen on admission   Continue ceftriaxone follow-up urine cultures to be carried out at completion of antibiotic therapy  Leukocytosis   Likely secondary to urinary tract infection   Monitor and trend WBC count  Mild hyponatremia   Monitor serum sodium levels  COPD by history   Continue all medications   Monitor respiratory status  CKD stage III   Monitor renal function   Renal dose all medications    FOLLOW UP APPOINTMENTS:  No future appointments.    Discussed with: NP/PA  Prognosis: fair    Total time spent is 55 minutes. Time spent in:   Preparing to see the patient (eg, review of tests).  Obtaining and/or reviewing separately obtained history.  Performing a medically appropriate examination and/or evaluation.  Ordering medications, tests, or procedures.  Documenting clinical information in electronic or other health record.  Independently interpreting results (not separately reported) and communicating results to the  patient/family/caregiver.    Electronically signed by Bijan Osorio DO   No

## 2024-11-26 ENCOUNTER — APPOINTMENT (OUTPATIENT)
Dept: OPHTHALMOLOGY | Facility: CLINIC | Age: 69
End: 2024-11-26
Payer: MEDICARE

## 2024-11-26 ENCOUNTER — NON-APPOINTMENT (OUTPATIENT)
Age: 69
End: 2024-11-26

## 2024-11-26 PROCEDURE — 92134 CPTRZ OPH DX IMG PST SGM RTA: CPT

## 2024-11-26 PROCEDURE — 99024 POSTOP FOLLOW-UP VISIT: CPT

## 2024-12-18 ENCOUNTER — APPOINTMENT (OUTPATIENT)
Dept: OPHTHALMOLOGY | Facility: CLINIC | Age: 69
End: 2024-12-18

## 2024-12-19 ENCOUNTER — APPOINTMENT (OUTPATIENT)
Dept: OTOLARYNGOLOGY | Facility: CLINIC | Age: 69
End: 2024-12-19
Payer: MEDICARE

## 2024-12-19 VITALS
HEART RATE: 67 BPM | WEIGHT: 176 LBS | SYSTOLIC BLOOD PRESSURE: 158 MMHG | HEIGHT: 71 IN | DIASTOLIC BLOOD PRESSURE: 91 MMHG | BODY MASS INDEX: 24.64 KG/M2

## 2024-12-19 DIAGNOSIS — C32.9 MALIGNANT NEOPLASM OF LARYNX, UNSPECIFIED: ICD-10-CM

## 2024-12-19 PROCEDURE — 31575 DIAGNOSTIC LARYNGOSCOPY: CPT

## 2024-12-19 PROCEDURE — 99212 OFFICE O/P EST SF 10 MIN: CPT | Mod: 25

## 2025-01-02 ENCOUNTER — NON-APPOINTMENT (OUTPATIENT)
Age: 70
End: 2025-01-02

## 2025-01-02 ENCOUNTER — APPOINTMENT (OUTPATIENT)
Dept: OPHTHALMOLOGY | Facility: CLINIC | Age: 70
End: 2025-01-02
Payer: MEDICARE

## 2025-01-02 PROCEDURE — 92012 INTRM OPH EXAM EST PATIENT: CPT | Mod: 24

## 2025-01-08 NOTE — PHYSICAL EXAM
fever [Outer Ear] : the ears and nose were normal in appearance [Hearing Threshold Finger Rub Not [place]] : hearing was normal [Examination Of The Oral Cavity] : the lips and gums were normal [Normal Oral Cavity] : oral cavity was normal [Both Tympanic Membranes Were Examined] : both tympanic membranes were normal [Nasal Cavity] : the nasal mucosa and septum were normal [Oropharynx] : The oropharynx was normal [Normal] : oriented to person, place and time, the affect was normal, the mood was normal and not anxious

## 2025-01-22 ENCOUNTER — NON-APPOINTMENT (OUTPATIENT)
Age: 70
End: 2025-01-22

## 2025-01-22 ENCOUNTER — APPOINTMENT (OUTPATIENT)
Dept: OPHTHALMOLOGY | Facility: CLINIC | Age: 70
End: 2025-01-22
Payer: MEDICARE

## 2025-01-22 PROCEDURE — 99024 POSTOP FOLLOW-UP VISIT: CPT

## 2025-03-12 ENCOUNTER — EMERGENCY (EMERGENCY)
Facility: HOSPITAL | Age: 70
LOS: 1 days | Discharge: HOME | End: 2025-03-12
Payer: MEDICARE

## 2025-03-12 VITALS
HEART RATE: 114 BPM | SYSTOLIC BLOOD PRESSURE: 144 MMHG | RESPIRATION RATE: 20 BRPM | WEIGHT: 169.98 LBS | HEIGHT: 70.5 IN | OXYGEN SATURATION: 94 % | TEMPERATURE: 100 F | DIASTOLIC BLOOD PRESSURE: 105 MMHG

## 2025-03-12 VITALS
SYSTOLIC BLOOD PRESSURE: 119 MMHG | RESPIRATION RATE: 20 BRPM | OXYGEN SATURATION: 94 % | DIASTOLIC BLOOD PRESSURE: 74 MMHG | HEART RATE: 81 BPM | TEMPERATURE: 100 F

## 2025-03-12 DIAGNOSIS — Z90.49 ACQUIRED ABSENCE OF OTHER SPECIFIED PARTS OF DIGESTIVE TRACT: Chronic | ICD-10-CM

## 2025-03-12 DIAGNOSIS — Z98.49 CATARACT EXTRACTION STATUS, UNSPECIFIED EYE: Chronic | ICD-10-CM

## 2025-03-12 LAB
ALBUMIN SERPL ELPH-MCNC: 3.9 G/DL — SIGNIFICANT CHANGE UP (ref 3.3–5)
ALP SERPL-CCNC: 171 U/L — HIGH (ref 40–120)
ALT FLD-CCNC: 34 U/L — SIGNIFICANT CHANGE UP (ref 10–45)
ANION GAP SERPL CALC-SCNC: 16 MMOL/L — SIGNIFICANT CHANGE UP (ref 5–17)
AST SERPL-CCNC: 26 U/L — SIGNIFICANT CHANGE UP (ref 10–40)
BASOPHILS # BLD AUTO: 0 K/UL — SIGNIFICANT CHANGE UP (ref 0–0.2)
BASOPHILS NFR BLD AUTO: 0 % — SIGNIFICANT CHANGE UP (ref 0–2)
BILIRUB SERPL-MCNC: 0.7 MG/DL — SIGNIFICANT CHANGE UP (ref 0.2–1.2)
BUN SERPL-MCNC: 17 MG/DL — SIGNIFICANT CHANGE UP (ref 7–23)
CALCIUM SERPL-MCNC: 9.6 MG/DL — SIGNIFICANT CHANGE UP (ref 8.4–10.5)
CHLORIDE SERPL-SCNC: 95 MMOL/L — LOW (ref 96–108)
CO2 SERPL-SCNC: 23 MMOL/L — SIGNIFICANT CHANGE UP (ref 22–31)
CREAT SERPL-MCNC: 0.79 MG/DL — SIGNIFICANT CHANGE UP (ref 0.5–1.3)
EGFR: 95 ML/MIN/1.73M2 — SIGNIFICANT CHANGE UP
EGFR: 95 ML/MIN/1.73M2 — SIGNIFICANT CHANGE UP
EOSINOPHIL # BLD AUTO: 0 K/UL — SIGNIFICANT CHANGE UP (ref 0–0.5)
EOSINOPHIL NFR BLD AUTO: 0 % — SIGNIFICANT CHANGE UP (ref 0–6)
FLUAV AG NPH QL: SIGNIFICANT CHANGE UP
FLUBV AG NPH QL: SIGNIFICANT CHANGE UP
GAS PNL BLDV: SIGNIFICANT CHANGE UP
GLUCOSE SERPL-MCNC: 152 MG/DL — HIGH (ref 70–99)
HCT VFR BLD CALC: 43 % — SIGNIFICANT CHANGE UP (ref 39–50)
HGB BLD-MCNC: 14.3 G/DL — SIGNIFICANT CHANGE UP (ref 13–17)
LYMPHOCYTES # BLD AUTO: 0.43 K/UL — LOW (ref 1–3.3)
LYMPHOCYTES # BLD AUTO: 2.6 % — LOW (ref 13–44)
MAGNESIUM SERPL-MCNC: 2 MG/DL — SIGNIFICANT CHANGE UP (ref 1.6–2.6)
MANUAL SMEAR VERIFICATION: SIGNIFICANT CHANGE UP
MCHC RBC-ENTMCNC: 31.6 PG — SIGNIFICANT CHANGE UP (ref 27–34)
MCHC RBC-ENTMCNC: 33.3 G/DL — SIGNIFICANT CHANGE UP (ref 32–36)
MCV RBC AUTO: 94.9 FL — SIGNIFICANT CHANGE UP (ref 80–100)
MONOCYTES # BLD AUTO: 2.01 K/UL — HIGH (ref 0–0.9)
MONOCYTES NFR BLD AUTO: 12.2 % — SIGNIFICANT CHANGE UP (ref 2–14)
NEUTROPHILS # BLD AUTO: 14.02 K/UL — HIGH (ref 1.8–7.4)
NEUTROPHILS NFR BLD AUTO: 83.5 % — HIGH (ref 43–77)
NEUTS BAND # BLD: 1.7 % — SIGNIFICANT CHANGE UP (ref 0–8)
NEUTS BAND NFR BLD: 1.7 % — SIGNIFICANT CHANGE UP (ref 0–8)
PHOSPHATE SERPL-MCNC: 3.4 MG/DL — SIGNIFICANT CHANGE UP (ref 2.5–4.5)
PLAT MORPH BLD: ABNORMAL
PLATELET # BLD AUTO: 328 K/UL — SIGNIFICANT CHANGE UP (ref 150–400)
POTASSIUM SERPL-MCNC: 4.5 MMOL/L — SIGNIFICANT CHANGE UP (ref 3.5–5.3)
POTASSIUM SERPL-SCNC: 4.5 MMOL/L — SIGNIFICANT CHANGE UP (ref 3.5–5.3)
PROT SERPL-MCNC: 8.2 G/DL — SIGNIFICANT CHANGE UP (ref 6–8.3)
RBC # BLD: 4.53 M/UL — SIGNIFICANT CHANGE UP (ref 4.2–5.8)
RBC # FLD: 12.2 % — SIGNIFICANT CHANGE UP (ref 10.3–14.5)
RBC BLD AUTO: SIGNIFICANT CHANGE UP
RSV RNA NPH QL NAA+NON-PROBE: SIGNIFICANT CHANGE UP
SARS-COV-2 RNA SPEC QL NAA+PROBE: SIGNIFICANT CHANGE UP
SODIUM SERPL-SCNC: 134 MMOL/L — LOW (ref 135–145)
WBC # BLD: 16.46 K/UL — HIGH (ref 3.8–10.5)
WBC # FLD AUTO: 16.46 K/UL — HIGH (ref 3.8–10.5)

## 2025-03-12 PROCEDURE — 99284 EMERGENCY DEPT VISIT MOD MDM: CPT | Mod: GC

## 2025-03-12 PROCEDURE — 71046 X-RAY EXAM CHEST 2 VIEWS: CPT | Mod: 26

## 2025-03-12 RX ORDER — METHYLPREDNISOLONE ACETATE 80 MG/ML
1 INJECTION, SUSPENSION INTRA-ARTICULAR; INTRALESIONAL; INTRAMUSCULAR; SOFT TISSUE
Qty: 1 | Refills: 0
Start: 2025-03-12

## 2025-03-12 RX ORDER — CEFTRIAXONE 500 MG/1
1000 INJECTION, POWDER, FOR SOLUTION INTRAMUSCULAR; INTRAVENOUS ONCE
Refills: 0 | Status: COMPLETED | OUTPATIENT
Start: 2025-03-12 | End: 2025-03-12

## 2025-03-12 RX ORDER — AZITHROMYCIN 250 MG
500 CAPSULE ORAL ONCE
Refills: 0 | Status: COMPLETED | OUTPATIENT
Start: 2025-03-12 | End: 2025-03-12

## 2025-03-12 RX ORDER — AZITHROMYCIN 250 MG
1 CAPSULE ORAL
Qty: 2 | Refills: 0
Start: 2025-03-12 | End: 2025-03-13

## 2025-03-12 RX ORDER — METHYLPREDNISOLONE ACETATE 80 MG/ML
125 INJECTION, SUSPENSION INTRA-ARTICULAR; INTRALESIONAL; INTRAMUSCULAR; SOFT TISSUE ONCE
Refills: 0 | Status: COMPLETED | OUTPATIENT
Start: 2025-03-12 | End: 2025-03-12

## 2025-03-12 RX ORDER — ACETAMINOPHEN 500 MG/5ML
975 LIQUID (ML) ORAL ONCE
Refills: 0 | Status: COMPLETED | OUTPATIENT
Start: 2025-03-12 | End: 2025-03-12

## 2025-03-12 RX ORDER — CEFPODOXIME PROXETIL 200 MG/1
1 TABLET, FILM COATED ORAL
Qty: 10 | Refills: 0
Start: 2025-03-12 | End: 2025-03-16

## 2025-03-12 RX ORDER — AZITHROMYCIN 250 MG
500 CAPSULE ORAL ONCE
Refills: 0 | Status: DISCONTINUED | OUTPATIENT
Start: 2025-03-12 | End: 2025-03-12

## 2025-03-12 RX ADMIN — CEFTRIAXONE 100 MILLIGRAM(S): 500 INJECTION, POWDER, FOR SOLUTION INTRAMUSCULAR; INTRAVENOUS at 16:52

## 2025-03-12 RX ADMIN — Medication 975 MILLIGRAM(S): at 15:19

## 2025-03-12 RX ADMIN — Medication 500 MILLIGRAM(S): at 16:52

## 2025-03-12 RX ADMIN — METHYLPREDNISOLONE ACETATE 125 MILLIGRAM(S): 80 INJECTION, SUSPENSION INTRA-ARTICULAR; INTRALESIONAL; INTRAMUSCULAR; SOFT TISSUE at 16:57

## 2025-03-19 PROCEDURE — 84132 ASSAY OF SERUM POTASSIUM: CPT

## 2025-03-19 PROCEDURE — 84100 ASSAY OF PHOSPHORUS: CPT

## 2025-03-19 PROCEDURE — 71046 X-RAY EXAM CHEST 2 VIEWS: CPT

## 2025-03-19 PROCEDURE — 80053 COMPREHEN METABOLIC PANEL: CPT

## 2025-03-19 PROCEDURE — 82435 ASSAY OF BLOOD CHLORIDE: CPT

## 2025-03-19 PROCEDURE — 99285 EMERGENCY DEPT VISIT HI MDM: CPT | Mod: 25

## 2025-03-19 PROCEDURE — 93005 ELECTROCARDIOGRAM TRACING: CPT

## 2025-03-19 PROCEDURE — 82330 ASSAY OF CALCIUM: CPT

## 2025-03-19 PROCEDURE — 96375 TX/PRO/DX INJ NEW DRUG ADDON: CPT

## 2025-03-19 PROCEDURE — 85018 HEMOGLOBIN: CPT

## 2025-03-19 PROCEDURE — 83735 ASSAY OF MAGNESIUM: CPT

## 2025-03-19 PROCEDURE — 96374 THER/PROPH/DIAG INJ IV PUSH: CPT

## 2025-03-19 PROCEDURE — 85025 COMPLETE CBC W/AUTO DIFF WBC: CPT

## 2025-03-19 PROCEDURE — 84295 ASSAY OF SERUM SODIUM: CPT

## 2025-03-19 PROCEDURE — 82803 BLOOD GASES ANY COMBINATION: CPT

## 2025-03-19 PROCEDURE — 82947 ASSAY GLUCOSE BLOOD QUANT: CPT

## 2025-03-19 PROCEDURE — 87637 SARSCOV2&INF A&B&RSV AMP PRB: CPT

## 2025-03-19 PROCEDURE — 83605 ASSAY OF LACTIC ACID: CPT

## 2025-03-19 PROCEDURE — 85014 HEMATOCRIT: CPT

## 2025-04-29 ENCOUNTER — APPOINTMENT (OUTPATIENT)
Dept: OPHTHALMOLOGY | Facility: CLINIC | Age: 70
End: 2025-04-29
Payer: MEDICARE

## 2025-04-29 ENCOUNTER — NON-APPOINTMENT (OUTPATIENT)
Age: 70
End: 2025-04-29

## 2025-04-29 PROCEDURE — 92014 COMPRE OPH EXAM EST PT 1/>: CPT

## 2025-04-29 PROCEDURE — 92133 CPTRZD OPH DX IMG PST SGM ON: CPT

## 2025-04-29 PROCEDURE — 92083 EXTENDED VISUAL FIELD XM: CPT

## 2025-07-29 ENCOUNTER — NON-APPOINTMENT (OUTPATIENT)
Age: 70
End: 2025-07-29

## 2025-07-29 ENCOUNTER — APPOINTMENT (OUTPATIENT)
Dept: OPHTHALMOLOGY | Facility: CLINIC | Age: 70
End: 2025-07-29
Payer: MEDICARE

## 2025-07-29 PROCEDURE — 92012 INTRM OPH EXAM EST PATIENT: CPT

## 2025-07-29 PROCEDURE — 92083 EXTENDED VISUAL FIELD XM: CPT

## 2025-07-29 NOTE — ED ADULT TRIAGE NOTE - NS ED TRIAGE AVPU SCALE
A (GUIDEWIRE GUIDE OMNIWIRE L185 CM PRSS SOLID PROX CORE) guidewire was removed. Alert-The patient is alert, awake and responds to voice. The patient is oriented to time, place, and person. The triage nurse is able to obtain subjective information.

## 2025-09-11 ENCOUNTER — APPOINTMENT (OUTPATIENT)
Dept: OTOLARYNGOLOGY | Facility: CLINIC | Age: 70
End: 2025-09-11
Payer: MEDICARE

## 2025-09-11 VITALS
HEART RATE: 68 BPM | SYSTOLIC BLOOD PRESSURE: 182 MMHG | BODY MASS INDEX: 23.1 KG/M2 | DIASTOLIC BLOOD PRESSURE: 104 MMHG | WEIGHT: 165 LBS | HEIGHT: 71 IN | OXYGEN SATURATION: 98 %

## 2025-09-11 DIAGNOSIS — C32.9 MALIGNANT NEOPLASM OF LARYNX, UNSPECIFIED: ICD-10-CM

## 2025-09-11 PROCEDURE — 99214 OFFICE O/P EST MOD 30 MIN: CPT | Mod: 25

## 2025-09-11 PROCEDURE — 31575 DIAGNOSTIC LARYNGOSCOPY: CPT

## 2025-09-17 ENCOUNTER — TRANSCRIPTION ENCOUNTER (OUTPATIENT)
Age: 70
End: 2025-09-17

## 2025-09-17 ENCOUNTER — APPOINTMENT (OUTPATIENT)
Dept: OTOLARYNGOLOGY | Facility: HOSPITAL | Age: 70
End: 2025-09-17

## 2025-09-18 ENCOUNTER — RESULT REVIEW (OUTPATIENT)
Age: 70
End: 2025-09-18

## 2025-09-19 PROBLEM — C10.9 OROPHARYNGEAL CANCER: Status: ACTIVE | Noted: 2025-09-19

## 2025-09-24 PROBLEM — I10 ESSENTIAL (PRIMARY) HYPERTENSION: Chronic | Status: ACTIVE | Noted: 2025-09-15
